# Patient Record
Sex: FEMALE | Race: WHITE | NOT HISPANIC OR LATINO | Employment: OTHER | URBAN - NONMETROPOLITAN AREA
[De-identification: names, ages, dates, MRNs, and addresses within clinical notes are randomized per-mention and may not be internally consistent; named-entity substitution may affect disease eponyms.]

---

## 2017-01-10 ENCOUNTER — OFFICE VISIT (OUTPATIENT)
Dept: NEUROSURGERY | Facility: CLINIC | Age: 37
End: 2017-01-10

## 2017-01-10 VITALS — HEIGHT: 66 IN | BODY MASS INDEX: 27.64 KG/M2 | WEIGHT: 172 LBS

## 2017-01-10 DIAGNOSIS — M50.20 CERVICAL DISC HERNIATION: Primary | ICD-10-CM

## 2017-01-10 PROCEDURE — 99213 OFFICE O/P EST LOW 20 MIN: CPT | Performed by: NEUROLOGICAL SURGERY

## 2017-01-10 NOTE — MR AVS SNAPSHOT
Heidi Franklin   1/10/2017 3:15 PM   Office Visit    Dept Phone:  [phone]   Encounter #:  [id number]    Provider:  Stuart Johnson MD   Department:  [facility]                Your Full Care Plan              Your Updated Medication List          This list is accurate as of: 1/10/17  3:22 PM.  Always use your most recent med list.                HYDROcodone-acetaminophen 5-325 MG per tablet   Commonly known as:  NORCO   Take 1 tablet by mouth Every 6 (Six) Hours As Needed for moderate pain (4-6).       meloxicam 7.5 MG tablet   Commonly known as:  MOBIC   Take 1 tablet by mouth Daily. Do not take with nsaid       MethylPREDNISolone 4 MG tablet   Commonly known as:  MEDROL (JASMYN)   Take as directed on package instructions.       MIRENA (52 MG) IU       SUMAtriptan 100 MG tablet   Commonly known as:  IMITREX   Take 1 tablet by mouth 1 (One) Time As Needed for migraine for up to 1 dose.       tiZANidine 4 MG tablet   Commonly known as:  ZANAFLEX   Take 1 tablet by mouth At Night As Needed for muscle spasms.               We Performed the Following     Case Request Cath Lab:  myelogram cervical spine       You Were Diagnosed With        Codes Comments    Cervical disc herniation    -  Primary ICD-10-CM: M50.20  ICD-9-CM: 722.0       Instructions     None    Patient Instructions History      Upcoming Appointments     Visit Type Date Time Department    OFFICE VISIT 1/10/2017  3:15 PM [facility]      [facility] Signup     [facility] allows you to send messages to your doctor, view your test results, renew your prescriptions, schedule appointments, and more. To sign up, go to [employer] and click on the Sign Up Now link in the New User? box. Enter your [facility] Activation Code exactly as it appears below along with the last four digits of your Social Security Number and your Date of Birth () to complete the sign-up  "process. If you do not sign up before the expiration date, you must request a new code.    [facility] Activation Code: [email or web address]  Expires: 1/21/2017  5:44 AM    If you have questions, you can email [email or web address] or call [phone] to talk to our [facility] staff. Remember, [facility] is NOT to be used for urgent needs. For medical emergencies, dial 911.               Other Info from Your Visit           Allergies     Citalopram  Other (See Comments)      Reason for Visit     Arm Pain     Shoulder Pain           Vital Signs     Height Weight Body Mass Index Smoking Status          66\" (167.6 cm) 172 lb (78 kg) 27.76 kg/m2 Current Every Day Smoker        Problems and Diagnoses Noted     Slipped disc in neck        "

## 2017-01-10 NOTE — PROGRESS NOTES
Subjective   Heidi Franklin is a 36 y.o. female who presents for follow up of right cervical radiculopathy.     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  She was treated with physical therapy and didn't experience any significant improvement.  An injection of steroid in her right shoulder actually gave her relief of her radiculopathy temporarily.    Her MRI scan done on 11/21/16 shows 3 level stenosis at C4 5, C5 6 and C6 7 but it is not adequate for making a diagnosis of the specific level of foraminal stenosis or disc herniation.    The following portions of the patient's history were reviewed, updated as appropriate and approved: allergies, current medications, past family history, past medical history, past social history, past surgical history, review of systems and problem list.     Review of Systems   Constitutional: Negative for activity change, appetite change, chills, diaphoresis, fatigue, fever and unexpected weight change.   HENT: Negative for congestion, dental problem, drooling, ear discharge, ear pain, facial swelling, hearing loss, mouth sores, nosebleeds, postnasal drip, rhinorrhea, sinus pressure, sneezing, sore throat, tinnitus, trouble swallowing and voice change.    Eyes: Negative for photophobia, pain, discharge, redness, itching and visual disturbance.   Respiratory: Negative for apnea, cough, choking, chest tightness, shortness of breath, wheezing and stridor.    Cardiovascular: Negative for chest pain, palpitations and leg swelling.   Gastrointestinal: Negative for abdominal distention, abdominal pain, anal bleeding, blood in stool, constipation, diarrhea, nausea, rectal pain and vomiting.   Endocrine: Negative for cold intolerance, heat intolerance, polydipsia, polyphagia and polyuria.    Genitourinary: Negative for decreased urine volume, difficulty urinating, dysuria, enuresis, flank pain, frequency, genital sores, hematuria and urgency.   Musculoskeletal: Positive for arthralgias, joint swelling, myalgias and neck pain. Negative for back pain, gait problem and neck stiffness.   Skin: Negative for color change, pallor, rash and wound.   Allergic/Immunologic: Negative for environmental allergies, food allergies and immunocompromised state.   Neurological: Positive for weakness and numbness. Negative for dizziness, tremors, seizures, syncope, facial asymmetry, speech difficulty, light-headedness and headaches.   Hematological: Negative for adenopathy. Does not bruise/bleed easily.   Psychiatric/Behavioral: Negative for agitation, behavioral problems, confusion, decreased concentration, dysphoric mood, hallucinations, self-injury, sleep disturbance and suicidal ideas. The patient is not nervous/anxious and is not hyperactive.        Objective    NEUROLOGICAL EXAMINATION:      Right middle finger numbness persists.  Reflexes remain trace and equal in the upper extremities.  There is no motor weakness.    Assessment   Cervical foraminal stenosis right probable C6 7 based on clinical criteria but uncertain based on MRI findings.       Plan   Cervical myelogram        Stuart Johnson MD

## 2017-01-20 ENCOUNTER — APPOINTMENT (OUTPATIENT)
Dept: CT IMAGING | Facility: HOSPITAL | Age: 37
End: 2017-01-20

## 2017-01-20 ENCOUNTER — HOSPITAL ENCOUNTER (OUTPATIENT)
Facility: HOSPITAL | Age: 37
Setting detail: HOSPITAL OUTPATIENT SURGERY
Discharge: HOME | End: 2017-01-20
Attending: RADIOLOGY | Admitting: RADIOLOGY

## 2017-01-20 VITALS
BODY MASS INDEX: 27.35 KG/M2 | WEIGHT: 170.19 LBS | TEMPERATURE: 97.8 F | OXYGEN SATURATION: 96 % | DIASTOLIC BLOOD PRESSURE: 96 MMHG | HEIGHT: 66 IN | SYSTOLIC BLOOD PRESSURE: 139 MMHG | HEART RATE: 94 BPM

## 2017-01-20 DIAGNOSIS — M50.20 CERVICAL DISC HERNIATION: ICD-10-CM

## 2017-01-20 LAB — B-HCG UR QL: NEGATIVE

## 2017-01-20 PROCEDURE — 81025 URINE PREGNANCY TEST: CPT | Performed by: RADIOLOGY

## 2017-01-20 PROCEDURE — 72240 MYELOGRAPHY NECK SPINE: CPT | Performed by: RADIOLOGY

## 2017-01-20 PROCEDURE — 0 IOPAMIDOL 61 % SOLUTION: Performed by: RADIOLOGY

## 2017-01-20 PROCEDURE — 61055 INJECTION INTO BRAIN CANAL: CPT | Performed by: RADIOLOGY

## 2017-01-20 PROCEDURE — 72125 CT NECK SPINE W/O DYE: CPT

## 2017-01-20 RX ORDER — DIAZEPAM 5 MG/1
5 TABLET ORAL ONCE
Status: DISCONTINUED | OUTPATIENT
Start: 2017-01-20 | End: 2017-01-20 | Stop reason: HOSPADM

## 2017-01-20 RX ORDER — LIDOCAINE HYDROCHLORIDE 10 MG/ML
INJECTION, SOLUTION INFILTRATION; PERINEURAL AS NEEDED
Status: DISCONTINUED | OUTPATIENT
Start: 2017-01-20 | End: 2017-01-20 | Stop reason: HOSPADM

## 2017-01-20 RX ORDER — ACETAMINOPHEN 500 MG
500 TABLET ORAL EVERY 6 HOURS PRN
COMMUNITY

## 2017-01-20 NOTE — H&P (VIEW-ONLY)
Subjective   Heidi Franklin is a 36 y.o. female who presents for follow up of right cervical radiculopathy.     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  She was treated with physical therapy and didn't experience any significant improvement.  An injection of steroid in her right shoulder actually gave her relief of her radiculopathy temporarily.    Her MRI scan done on 11/21/16 shows 3 level stenosis at C4 5, C5 6 and C6 7 but it is not adequate for making a diagnosis of the specific level of foraminal stenosis or disc herniation.    The following portions of the patient's history were reviewed, updated as appropriate and approved: allergies, current medications, past family history, past medical history, past social history, past surgical history, review of systems and problem list.     Review of Systems   Constitutional: Negative for activity change, appetite change, chills, diaphoresis, fatigue, fever and unexpected weight change.   HENT: Negative for congestion, dental problem, drooling, ear discharge, ear pain, facial swelling, hearing loss, mouth sores, nosebleeds, postnasal drip, rhinorrhea, sinus pressure, sneezing, sore throat, tinnitus, trouble swallowing and voice change.    Eyes: Negative for photophobia, pain, discharge, redness, itching and visual disturbance.   Respiratory: Negative for apnea, cough, choking, chest tightness, shortness of breath, wheezing and stridor.    Cardiovascular: Negative for chest pain, palpitations and leg swelling.   Gastrointestinal: Negative for abdominal distention, abdominal pain, anal bleeding, blood in stool, constipation, diarrhea, nausea, rectal pain and vomiting.   Endocrine: Negative for cold intolerance, heat intolerance, polydipsia, polyphagia and polyuria.    Genitourinary: Negative for decreased urine volume, difficulty urinating, dysuria, enuresis, flank pain, frequency, genital sores, hematuria and urgency.   Musculoskeletal: Positive for arthralgias, joint swelling, myalgias and neck pain. Negative for back pain, gait problem and neck stiffness.   Skin: Negative for color change, pallor, rash and wound.   Allergic/Immunologic: Negative for environmental allergies, food allergies and immunocompromised state.   Neurological: Positive for weakness and numbness. Negative for dizziness, tremors, seizures, syncope, facial asymmetry, speech difficulty, light-headedness and headaches.   Hematological: Negative for adenopathy. Does not bruise/bleed easily.   Psychiatric/Behavioral: Negative for agitation, behavioral problems, confusion, decreased concentration, dysphoric mood, hallucinations, self-injury, sleep disturbance and suicidal ideas. The patient is not nervous/anxious and is not hyperactive.        Objective    NEUROLOGICAL EXAMINATION:      Right middle finger numbness persists.  Reflexes remain trace and equal in the upper extremities.  There is no motor weakness.    Assessment   Cervical foraminal stenosis right probable C6 7 based on clinical criteria but uncertain based on MRI findings.       Plan   Cervical myelogram        Stuart Johnson MD

## 2017-01-20 NOTE — IP AVS SNAPSHOT
AFTER VISIT SUMMARY             Heidi Franklin           About your hospitalization     You were admitted on:  January 20, 2017 You last received care in the:  [facility]       Procedures & Surgeries      Procedure(s) (LRB):   myelogram cervical spine (N/A)     1/20/2017     Surgeon(s):  Moody Fitch MD  -------------------      Medications    If you or your caregiver advised us that you are currently taking a medication and that medication is marked below as “Resume”, this simply indicates that we have reviewed those medications to make sure our new therapy recommendations do not interfere.  If you have concerns about medications other than those new ones which we are prescribing today, please consult the physician who prescribed them (or your primary physician).  Our review of your home medications is not meant to indicate that we are directing their use.             Your Medications      CONTINUE taking these medications     acetaminophen 500 MG tablet   Take 500 mg by mouth Every 6 (Six) Hours As Needed for mild pain (1-3).   Commonly known as:  TYLENOL           meloxicam 7.5 MG tablet   Take 1 tablet by mouth Daily. Do not take with nsaid   Commonly known as:  MOBIC           MIRENA (52 MG) IU   by Intrauterine route.           SUMAtriptan 100 MG tablet   Take 1 tablet by mouth 1 (One) Time As Needed for migraine for up to 1 dose.   Commonly known as:  IMITREX                      Your Medications      Your Medication List           Morning Noon Evening Bedtime As Needed    acetaminophen 500 MG tablet   Take 500 mg by mouth Every 6 (Six) Hours As Needed for mild pain (1-3).   Commonly known as:  TYLENOL                                meloxicam 7.5 MG tablet   Take 1 tablet by mouth Daily. Do not take with nsaid   Commonly known as:  MOBIC                                MIRENA (52 MG) IU   by Intrauterine route.                                SUMAtriptan 100 MG tablet   Take 1 tablet by  mouth 1 (One) Time As Needed for migraine for up to 1 dose.   Commonly known as:  IMITREX                                         Instructions for After Discharge        Discharge References/Attachments     MYELOGRAPHY, CARE AFTER, EASY-TO-READ (ENGLISH)       Follow-ups for After Discharge        Follow-up Information     Follow up with Stuart Johnson MD Follow up in 1 week(s).    Specialty:  Neurosurgery    Why:  [facility]    Contact information:    [address]    [facility] [address] [phone]        Scheduled Appointments     Jan 31, 2017 11:45 AM EST   Office Visit with Stuart Johnson MD   [facility] (--)    [address]  [facility] [address] [phone]           Arrive 15 minutes prior to appointment.              Inside Signup     Our records indicate that you have an active [employer] account.    You can view your After Visit Summary by going to [employer] and logging in with your Inside username and password.  If you don't have a Inside username and password but a parent or guardian has access to your record, the parent or guardian should login with their own Inside username and password and access your record to view the After Visit Summary.    If you have questions, you can email [email or web address] or call [phone] to talk to our Inside staff.  Remember, Inside is NOT to be used for urgent needs.  For medical emergencies, dial 911.           Summary of Your Hospitalization        Reason for Hospitalization     Your primary diagnosis was:  Not on File      Care Providers     Provider Service Role Specialty    Moody Fitch MD Radiology Attending Provider Radiology      Your Allergies  Date Reviewed: 1/20/2017    Allergen Reactions    Citalopram Other (See Comments)      Patient Belongings Returned     Document Return of Belongings Flowsheet     Were the patient  bedside belongings sent home?   --   Belongings Retrieved from Security & Sent Home   --    Belongings Sent to Safe   --   Medications Retrieved from Pharmacy & Sent Home   --              More Information      Myelography, Care After  These instructions give you information on caring for yourself after your procedure. Your doctor may also give you more specific instructions. Call your doctor if you have any problems or questions after your procedure.  HOME CARE  · Rest the first day.  · When you rest, lie flat, with your head slightly raised (elevated).  · Avoid heavy lifting and activity for 48 hours, or as told by your doctor.  · You may take the bandage (dressing) off one day after the test, or as told by your doctor.  · Take all medicines only as told by your doctor.  · Ask your doctor when it is okay to take a shower or bath.  · Ask your doctor when your test results will be ready and how you can get them. Make sure you follow up and get your results.  · Do not drink alcohol for 24 hours, or as told by your doctor.  · Drink enough fluid to keep your pee (urine) clear or pale yellow.  GET HELP IF:   · You have a fever.  · You have a headache.  · You feel sick to your stomach (nauseous) or throw up (vomit).  · You have pain or cramping in your belly (abdomen).  GET HELP RIGHT AWAY IF:   · You have a headache with a stiff neck or fever.  · You have trouble breathing.  · Any of the places where the needles were put in are:    Puffy (swollen) or red.    Sore or hot to the touch.    Draining yellowish-white fluid (pus).    Bleeding.  MAKE SURE YOU:  · Understand these instructions.  · Will watch your condition.  · Will get help right away if you are not doing well or get worse.     This information is not intended to replace advice given to you by your health care provider. Make sure you discuss any questions you have with your health care provider.     Document Released: 09/26/2009 Document Revised: 01/08/2016  Document Reviewed: 09/11/2013  [employer] Interactive Patient Education ©2016 [employer]         PREVENTING SURGICAL SITE INFECTIONS     Surgical Site Infections FAQs  What is a Surgical Site Infection (SSI)?  A surgical site infection is an infection that occurs after surgery in the part of the body where the surgery took place. Most patients who have surgery do not develop an infection. However, infections develop in about 1 to 3 out of every 100 patients who have surgery.  Some of the common symptoms of a surgical site infection are:  · Redness and pain around the area where you had surgery  · Drainage of cloudy fluid from your surgical wound  · Fever  Can SSIs be treated?  Yes. Most surgical site infections can be treated with antibiotics. The antibiotic given to you depends on the bacteria (germs) causing the infection. Sometimes patients with SSIs also need another surgery to treat the infection.  What are some of the things that hospitals are doing to prevent SSIs?  To prevent SSIs, doctors, nurses, and other healthcare providers:  · Clean their hands and arms up to their elbows with an antiseptic agent just before the surgery.  · Clean their hands with soap and water or an alcohol-based hand rub before and after caring for each patient.  · May remove some of your hair immediately before your surgery using electric clippers if the hair is in the same area where the procedure will occur. They should not shave you with a razor.  · Wear special hair covers, masks, gowns, and gloves during surgery to keep the surgery area clean.  · Give you antibiotics before your surgery starts. In most cases, you should get antibiotics within 60 minutes before the surgery starts and the antibiotics should be stopped within 24 hours after surgery.  · Clean the skin at the site of your surgery with a special soap that kills germs.  What can I do to help prevent SSIs?  Before your surgery:  · Tell your doctor about other medical  problems you may have. Health problems such as allergies, diabetes, and obesity could affect your surgery and your treatment.  · Quit smoking. Patients who smoke get more infections. Talk to your doctor about how you can quit before your surgery.  · Do not shave near where you will have surgery. Shaving with a razor can irritate your skin and make it easier to develop an infection.  At the time of your surgery:  · Speak up if someone tries to shave you with a razor before surgery. Ask why you need to be shaved and talk with your surgeon if you have any concerns.  · Ask if you will get antibiotics before surgery.  After your surgery:  · Make sure that your healthcare providers clean their hands before examining you, either with soap and water or an alcohol-based hand rub.    If you do not see your providers clean their hands, please ask them to do so.  · Family and friends who visit you should not touch the surgical wound or dressings.  · Family and friends should clean their hands with soap and water or an alcohol-based hand rub before and after visiting you. If you do not see them clean their hands, ask them to clean their hands.  What do I need to do when I go home from the hospital?  · Before you go home, your doctor or nurse should explain everything you need to know about taking care of your wound. Make sure you understand how to care for your wound before you leave the hospital.  · Always clean your hands before and after caring for your wound.  · Before you go home, make sure you know who to contact if you have questions or problems after you get home.  · If you have any symptoms of an infection, such as redness and pain at the surgery site, drainage, or fever, call your doctor immediately.  If you have additional questions, please ask your doctor or nurse.  Developed and co-sponsored by The Society for Healthcare Epidemiology of [place] (SHEA); Infectious Diseases Society of [place] (IDSA); [facility]  Association; Association for Professionals in Infection Control and Epidemiology (APIC); Centers for Disease Control and Prevention (CDC); and The Joint Commission.     This information is not intended to replace advice given to you by your health care provider. Make sure you discuss any questions you have with your health care provider.     Document Released: 12/23/2014 Document Revised: 01/08/2016 Document Reviewed: 03/02/2016  [employer] Interactive Patient Education ©2016 [employer]             SYMPTOMS OF A STROKE    Call 911 or have someone take you to the Emergency Department if you have any of the following:    · Sudden numbness or weakness of your face, arm or leg especially on one side of the body  · Sudden confusion, diffiiculty speaking or trouble understanding   · Changes in your vision or loss of sight in one eye  · Sudden severe headache with no known cause  · sudden dizziness, trouble walking, loss of balance or coordination    It is important to seek emergency care right away if you have further stroke symptoms. If you get emergency help quickly, the powerful clot-dissolving medicines can reduce the disabilities caused by a stroke.     For more information:    American Stroke Association  2-582-6-STROKE  www.strokeassociation.org           IF YOU SMOKE OR USE TOBACCO PLEASE READ THE FOLLOWING:    Why is smoking bad for me?  Smoking increases the risk of heart disease, lung disease, vascular disease, stroke, and cancer.     If you smoke, STOP!    If you would like more information on quitting smoking, please visit the [employer] website: [email or web address]   This link will provide additional resources including the QUIT line and the [employer] support groups.     For more information:    American Cancer Society  ([phone] 373-8401    American Heart Association  1-350.765.8741               YOU ARE THE MOST IMPORTANT FACTOR IN YOUR RECOVERY.     Follow  all instructions carefully.     I have reviewed my discharge instructions with my nurse, including the following information, if applicable:     Information about my illness and diagnosis   Follow up appointments (including lab draws)   Wound Care   Equipment Needs   Medications (new and continuing) along with side effects   Preventative information such as vaccines and smoking cessations   Diet   Pain   I know when to contact my Doctor's office or seek emergency care      I want my nurse to describe the side effects of my medications: YES NO   If the answer is no, I understand the side effects of my medications: YES NO   My nurse described the side effects of my medications in a way that I could understand: YES NO   I have taken my personal belongings and my own medications with me at discharge: YES NO            I have received this information and my questions have been answered. I have discussed any concerns I see with this plan with the nurse or physician. I understand these instructions.    Signature of Patient or Responsible Person: _____________________________________    Date: _________________  Time: __________________    Signature of Healthcare Provider: _______________________________________  Date: _________________  Time: __________________

## 2017-01-31 ENCOUNTER — OFFICE VISIT (OUTPATIENT)
Dept: NEUROSURGERY | Facility: CLINIC | Age: 37
End: 2017-01-31

## 2017-01-31 VITALS — BODY MASS INDEX: 27.64 KG/M2 | WEIGHT: 172 LBS | HEIGHT: 66 IN

## 2017-01-31 DIAGNOSIS — M48.02 FORAMINAL STENOSIS OF CERVICAL REGION: ICD-10-CM

## 2017-01-31 DIAGNOSIS — M54.12 CERVICAL RADICULOPATHY: Primary | ICD-10-CM

## 2017-01-31 PROCEDURE — 99213 OFFICE O/P EST LOW 20 MIN: CPT | Performed by: NEUROLOGICAL SURGERY

## 2017-01-31 NOTE — PROGRESS NOTES
Subjective   Heidi Franklin is a 36 y.o. female who presents for follow up of  right shoulder and arm pain.     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  Pain has begun to experience on the left side now as well going to the middle finger.  She's had physical therapy without seeing improvement.    MRI scan on 11/21/16 showed 3 level stenosis at C4 5, C5 6 and C6 7.  Cervical myelogram was done on 1/20/17 and shows bilateral nerve root impingement at C6 7, right sided nerve root impingement at C5 6, and even right sided nerve root impingement at C4 5.  There is a kyphotic curve from C4 to C7.    Surgical correction of this problem would require decompression of the right side at at least 3 levels from C4 to C7, and on the left side at C67 in order to decompress all the radiographic abnormalities, though the clinical symptoms appear to be generated at the C6 7 level.    The following portions of the patient's history were reviewed, updated as appropriate and approved: allergies, current medications, past family history, past medical history, past social history, past surgical history, review of systems and problem list.     Review of Systems   Constitutional: Negative for activity change, appetite change, chills, diaphoresis, fatigue, fever and unexpected weight change.   HENT: Negative for congestion, dental problem, drooling, ear discharge, ear pain, facial swelling, hearing loss, mouth sores, nosebleeds, postnasal drip, rhinorrhea, sinus pressure, sneezing, sore throat, tinnitus, trouble swallowing and voice change.    Eyes: Negative for photophobia, pain, discharge, redness, itching and visual disturbance.   Respiratory: Negative for apnea, cough, choking, chest tightness, shortness of breath, wheezing and stridor.    Cardiovascular: Negative for chest pain,  palpitations and leg swelling.   Gastrointestinal: Negative for abdominal distention, abdominal pain, anal bleeding, blood in stool, constipation, diarrhea, nausea, rectal pain and vomiting.   Endocrine: Negative for cold intolerance, heat intolerance, polydipsia, polyphagia and polyuria.   Genitourinary: Negative for decreased urine volume, difficulty urinating, dysuria, enuresis, flank pain, frequency, genital sores, hematuria and urgency.   Musculoskeletal: Positive for arthralgias, joint swelling, myalgias and neck pain. Negative for back pain, gait problem and neck stiffness.   Skin: Negative for color change, pallor, rash and wound.   Allergic/Immunologic: Negative for environmental allergies, food allergies and immunocompromised state.   Neurological: Positive for weakness and numbness. Negative for dizziness, tremors, seizures, syncope, facial asymmetry, speech difficulty, light-headedness and headaches.   Hematological: Negative for adenopathy. Does not bruise/bleed easily.   Psychiatric/Behavioral: Negative for agitation, behavioral problems, confusion, decreased concentration, dysphoric mood, hallucinations, self-injury, sleep disturbance and suicidal ideas. The patient is not nervous/anxious and is not hyperactive.        Objective    NEUROLOGICAL EXAMINATION:    Bilateral middle finger numbness.  Trace and equal biceps and triceps reflexes bilaterally.    Assessment   Bilateral C7 radiculopathy with bilateral foraminal stenosis C6 7.  Also significant degenerative disc change C4 5, C5 6, and C6 7.  Right nerve root compression at C5 6 and C4 5.       Plan   Because of the complexity of the situation I will have her go through a trial of cervical epidural injections to see if this can control symptoms.  If it doesn't we will have to see her again and make a decision regarding surgery, which would be to either select C6 7 for an ACDF based on the clinical symptoms and radiographic findings, or 3 level  ACDF simply based on the radiographic findings.  I've explained to her that adding additional levels introduces new complicating potential problems.  Follow-up after completing her pain management evaluation and treatment if symptoms persist.       Stuart Johnson MD

## 2017-01-31 NOTE — MR AVS SNAPSHOT
Heidi Franklin   1/31/2017 11:45 AM   Office Visit    Dept Phone:  [phone]   Encounter #:  [id number]    Provider:  Stuart Johnson MD   Department:  [facility]                Your Full Care Plan              Your Updated Medication List          This list is accurate as of: 1/31/17  1:04 PM.  Always use your most recent med list.                acetaminophen 500 MG tablet   Commonly known as:  TYLENOL       meloxicam 7.5 MG tablet   Commonly known as:  MOBIC   Take 1 tablet by mouth Daily. Do not take with nsaid       MIRENA (52 MG) IU       SUMAtriptan 100 MG tablet   Commonly known as:  IMITREX   Take 1 tablet by mouth 1 (One) Time As Needed for migraine for up to 1 dose.               We Performed the Following     Ambulatory Referral to Pain Management       You Were Diagnosed With        Codes Comments    Cervical radiculopathy    -  Primary ICD-10-CM: M54.12  ICD-9-CM: 723.4     Foraminal stenosis of cervical region     ICD-10-CM: M99.81  ICD-9-CM: 723.0       Instructions     None    Patient Instructions History      Upcoming Appointments     Visit Type Date Time Department    OFFICE VISIT 1/31/2017 11:45 AM [facility]      [employer] Signup     Our records indicate that you have an active [facility] [employer] account.    You can view your After Visit Summary by going to [employer] and logging in with your [employer] username and password.  If you don't have a [employer] username and password but a parent or guardian has access to your record, the parent or guardian should login with their own [employer] username and password and access your record to view the After Visit Summary.    If you have questions, you can email [email or web address] or call [phone] to talk to our [employer] staff.  Remember, [employer] is NOT to be used for urgent needs.  For medical emergencies, dial 911.               Other Info from Your  "Visit           Allergies     Citalopram  Other (See Comments)      Reason for Visit     Arm Pain     Shoulder Pain           Vital Signs     Height Weight Body Mass Index Smoking Status          66\" (167.6 cm) 172 lb (78 kg) 27.76 kg/m2 Current Every Day Smoker        Problems and Diagnoses Noted     Cervical nerve root disorder    -  Primary    Foraminal stenosis of cervical region            "

## 2017-02-01 ENCOUNTER — TELEPHONE (OUTPATIENT)
Dept: INTERNAL MEDICINE | Facility: CLINIC | Age: 37
End: 2017-02-01

## 2017-02-01 DIAGNOSIS — M50.10 CERVICAL RADICULOPATHY DUE TO INTERVERTEBRAL DISC DISORDER: Primary | ICD-10-CM

## 2017-02-01 NOTE — TELEPHONE ENCOUNTER
----- Message from Agnes Van sent at 1/31/2017  4:09 PM EST -----  Contact: Patient  Priyanka had referred the patient to neuro. Today she saw Dr. Johnson, but didn't feel confident in his findings. Pt would like a referral to see a [facility] neurologist for a second opinion.

## 2017-05-09 ENCOUNTER — OFFICE VISIT (OUTPATIENT)
Dept: SURGERY | Facility: CLINIC | Age: 37
End: 2017-05-09

## 2017-05-09 VITALS
SYSTOLIC BLOOD PRESSURE: 136 MMHG | TEMPERATURE: 98.8 F | BODY MASS INDEX: 27.64 KG/M2 | WEIGHT: 172 LBS | HEIGHT: 66 IN | DIASTOLIC BLOOD PRESSURE: 88 MMHG

## 2017-05-09 DIAGNOSIS — N63.0 BREAST MASS: ICD-10-CM

## 2017-05-09 DIAGNOSIS — R22.31 AXILLARY MASS, RIGHT: Primary | ICD-10-CM

## 2017-05-09 PROCEDURE — 99244 OFF/OP CNSLTJ NEW/EST MOD 40: CPT | Performed by: SURGERY

## 2017-05-16 ENCOUNTER — HOSPITAL ENCOUNTER (OUTPATIENT)
Dept: ULTRASOUND IMAGING | Facility: HOSPITAL | Age: 37
Discharge: HOME | End: 2017-05-16

## 2017-05-16 ENCOUNTER — HOSPITAL ENCOUNTER (OUTPATIENT)
Dept: MAMMOGRAPHY | Facility: HOSPITAL | Age: 37
Discharge: HOME | End: 2017-05-16
Attending: SURGERY | Admitting: SURGERY

## 2017-05-16 DIAGNOSIS — N63.10 MASS OF BREAST, RIGHT: ICD-10-CM

## 2017-05-16 PROCEDURE — 76642 ULTRASOUND BREAST LIMITED: CPT

## 2017-05-16 PROCEDURE — G0279 TOMOSYNTHESIS, MAMMO: HCPCS

## 2017-05-16 PROCEDURE — G0204 DX MAMMO INCL CAD BI: HCPCS

## 2017-05-23 ENCOUNTER — OFFICE VISIT (OUTPATIENT)
Dept: SURGERY | Facility: CLINIC | Age: 37
End: 2017-05-23

## 2017-05-23 VITALS — BODY MASS INDEX: 27.64 KG/M2 | HEIGHT: 66 IN | WEIGHT: 172 LBS | TEMPERATURE: 98.6 F

## 2017-05-23 DIAGNOSIS — N63.0 BREAST MASS: Primary | ICD-10-CM

## 2017-05-23 PROCEDURE — 19083 BX BREAST 1ST LESION US IMAG: CPT | Performed by: SURGERY

## 2017-05-23 RX ORDER — DIAZEPAM 10 MG/1
TABLET ORAL
COMMUNITY
Start: 2017-05-22 | End: 2017-10-31

## 2017-05-24 ENCOUNTER — TELEPHONE (OUTPATIENT)
Dept: SURGERY | Facility: CLINIC | Age: 37
End: 2017-05-24

## 2017-05-30 ENCOUNTER — OFFICE VISIT (OUTPATIENT)
Dept: SURGERY | Facility: CLINIC | Age: 37
End: 2017-05-30

## 2017-05-30 VITALS
WEIGHT: 172 LBS | DIASTOLIC BLOOD PRESSURE: 84 MMHG | HEIGHT: 66 IN | SYSTOLIC BLOOD PRESSURE: 130 MMHG | HEART RATE: 74 BPM | BODY MASS INDEX: 27.64 KG/M2 | TEMPERATURE: 98.2 F

## 2017-05-30 DIAGNOSIS — N63.0 BREAST MASS: Primary | ICD-10-CM

## 2017-05-30 PROCEDURE — 99213 OFFICE O/P EST LOW 20 MIN: CPT | Performed by: SURGERY

## 2017-06-05 ENCOUNTER — OFFICE VISIT (OUTPATIENT)
Dept: SURGERY | Facility: CLINIC | Age: 37
End: 2017-06-05

## 2017-06-05 VITALS
TEMPERATURE: 98.4 F | BODY MASS INDEX: 27.64 KG/M2 | DIASTOLIC BLOOD PRESSURE: 88 MMHG | SYSTOLIC BLOOD PRESSURE: 140 MMHG | WEIGHT: 172 LBS | HEIGHT: 66 IN

## 2017-06-05 DIAGNOSIS — N60.02 BREAST CYST, LEFT: Primary | ICD-10-CM

## 2017-06-05 PROCEDURE — 10022 PR FINE NEEDLE ASP;W/IMAGING GUIDANCE: CPT | Performed by: SURGERY

## 2017-06-05 PROCEDURE — 99213 OFFICE O/P EST LOW 20 MIN: CPT | Performed by: SURGERY

## 2017-06-05 NOTE — PROGRESS NOTES
"Patient: Heidi Franklin    YOB: 1980    Date: 06/05/2017    Primary Care Provider: Marilyn K. Vermeesch, MD    Reason for Consultation: Left Breast cyst    Chief Complaint:   Chief Complaint   Patient presents with   • Abnormal Breast Imaging     Left Breast cyst. Had mammogram and ultrasound [facility]. Left breast birads 3.       History: The patient is here today following up from a recent mammogram and left breast ultrasound.  The ultrasound showed a left breast cyst.  She is here today for a possible fine needle aspiration.  She denies a left breast lump or skin changes.  She does have breast tenderness.      Review of Systems    Vital Signs  /88 (BP Location: Left arm)  Temp 98.4 °F (36.9 °C) (Temporal Artery )   Ht 66\" (167.6 cm)  Wt 172 lb (78 kg)  BMI 27.76 kg/m2    Allergies:  Allergies   Allergen Reactions   • Citalopram Other (See Comments)       Medications:    Current Outpatient Prescriptions:   •  acetaminophen (TYLENOL) 500 MG tablet, Take 500 mg by mouth Every 6 (Six) Hours As Needed for mild pain (1-3)., Disp: , Rfl:   •  Levonorgestrel (MIRENA, 52 MG, IU), by Intrauterine route., Disp: , Rfl:   •  meloxicam (MOBIC) 7.5 MG tablet, Take 1 tablet by mouth Daily. Do not take with nsaid, Disp: 30 tablet, Rfl: 1  •  SUMAtriptan (IMITREX) 100 MG tablet, Take 1 tablet by mouth 1 (One) Time As Needed for migraine for up to 1 dose., Disp: 9 tablet, Rfl: 5  •  diazePAM (VALIUM) 10 MG tablet, , Disp: , Rfl:     Physical Exam:   General Appearance:    Alert, cooperative, in no acute distress   Head:    Normocephalic, without obvious abnormality, atraumatic   Lungs:     Clear to auscultation,respirations regular, even and                  unlabored    Heart:    Regular rhythm and normal rate, normal S1 and S2, no            murmur, no gallop, no rub, no click   Abdomen:     Normal bowel sounds, no masses, no organomegaly, soft        non-tender, non-distended, no guarding, no rebound            "     tenderness   Extremities:   Moves all extremities well, no edema, no cyanosis, no             redness   Pulses:   Pulses palpable and equal bilaterally   Skin:   No bleeding, bruising or rash      Assessment/Plan     1. Breast cyst, left        Procedure:    I recommend a FNA of the left breast lesion. The procedure and risks were clearly explained including bleeding, infection and requirement for re-biopsy and the patient understood these and wishes to proceed.    The patient was brought to the procedure room. Consent and time out were performed. The area was prepped and draped in the usual fashion. 1% lidocaine with epinephrine was infused locally. A 20G needle was used for biopsy under ultrasound guidance. Minimal blood loss had occurred and hemostasis had been well controlled with pressure. . The patient tolerated the procedure and there were no complications. We will make a f/u appointment to discuss results.      Rizwan Perera MD  06/06/17

## 2017-10-31 ENCOUNTER — OFFICE VISIT (OUTPATIENT)
Dept: INTERNAL MEDICINE | Facility: CLINIC | Age: 37
End: 2017-10-31

## 2017-10-31 ENCOUNTER — HOSPITAL ENCOUNTER (OUTPATIENT)
Dept: GENERAL RADIOLOGY | Facility: HOSPITAL | Age: 37
Discharge: HOME | End: 2017-10-31
Attending: FAMILY MEDICINE | Admitting: FAMILY MEDICINE

## 2017-10-31 VITALS
HEIGHT: 66 IN | SYSTOLIC BLOOD PRESSURE: 134 MMHG | TEMPERATURE: 98.5 F | DIASTOLIC BLOOD PRESSURE: 82 MMHG | RESPIRATION RATE: 12 BRPM | BODY MASS INDEX: 27.48 KG/M2 | HEART RATE: 98 BPM | OXYGEN SATURATION: 99 % | WEIGHT: 171 LBS

## 2017-10-31 DIAGNOSIS — M54.6 CHRONIC THORACIC BACK PAIN, UNSPECIFIED BACK PAIN LATERALITY: ICD-10-CM

## 2017-10-31 DIAGNOSIS — G89.29 CHRONIC RIGHT-SIDED LOW BACK PAIN, WITH SCIATICA PRESENCE UNSPECIFIED: ICD-10-CM

## 2017-10-31 DIAGNOSIS — M54.5 CHRONIC RIGHT-SIDED LOW BACK PAIN, WITH SCIATICA PRESENCE UNSPECIFIED: ICD-10-CM

## 2017-10-31 DIAGNOSIS — Z98.890 H/O CERVICAL SPINE SURGERY: ICD-10-CM

## 2017-10-31 DIAGNOSIS — G89.29 CHRONIC THORACIC BACK PAIN, UNSPECIFIED BACK PAIN LATERALITY: ICD-10-CM

## 2017-10-31 DIAGNOSIS — M54.12 CERVICAL RADICULOPATHY: Primary | ICD-10-CM

## 2017-10-31 DIAGNOSIS — M54.2 NECK PAIN: ICD-10-CM

## 2017-10-31 DIAGNOSIS — M54.12 CERVICAL RADICULOPATHY: ICD-10-CM

## 2017-10-31 PROCEDURE — 72070 X-RAY EXAM THORAC SPINE 2VWS: CPT

## 2017-10-31 PROCEDURE — 72040 X-RAY EXAM NECK SPINE 2-3 VW: CPT

## 2017-10-31 PROCEDURE — 99213 OFFICE O/P EST LOW 20 MIN: CPT | Performed by: FAMILY MEDICINE

## 2017-10-31 PROCEDURE — 72110 X-RAY EXAM L-2 SPINE 4/>VWS: CPT

## 2017-10-31 RX ORDER — TIZANIDINE HYDROCHLORIDE 2 MG/1
2 CAPSULE, GELATIN COATED ORAL 3 TIMES DAILY
Qty: 90 CAPSULE | Refills: 1 | Status: SHIPPED | OUTPATIENT
Start: 2017-10-31 | End: 2018-06-15

## 2017-10-31 RX ORDER — OXYCODONE HYDROCHLORIDE 5 MG/1
TABLET ORAL
Refills: 0 | COMMUNITY
Start: 2017-08-08 | End: 2018-06-15 | Stop reason: SDUPTHER

## 2017-10-31 RX ORDER — CYCLOBENZAPRINE HCL 5 MG
TABLET ORAL
Refills: 0 | COMMUNITY
Start: 2017-10-28 | End: 2017-10-31

## 2017-11-03 ENCOUNTER — TELEPHONE (OUTPATIENT)
Dept: INTERNAL MEDICINE | Facility: CLINIC | Age: 37
End: 2017-11-03

## 2017-11-21 ENCOUNTER — TELEPHONE (OUTPATIENT)
Dept: OBSTETRICS AND GYNECOLOGY | Facility: CLINIC | Age: 37
End: 2017-11-21

## 2017-11-21 NOTE — TELEPHONE ENCOUNTER
----- Message from Stevie French sent at 11/21/2017  9:39 AM EST -----  Contact: PATIENT  Patient called to schedule her annual in January and wants to know if she can have Diflucan called in. Patient also wants to be notified when its been approved.

## 2017-11-22 RX ORDER — FLUCONAZOLE 150 MG/1
150 TABLET ORAL DAILY
Qty: 1 TABLET | Refills: 1 | Status: SHIPPED | OUTPATIENT
Start: 2017-11-22 | End: 2018-01-23

## 2018-01-23 ENCOUNTER — OFFICE VISIT (OUTPATIENT)
Dept: OBSTETRICS AND GYNECOLOGY | Facility: CLINIC | Age: 38
End: 2018-01-23

## 2018-01-23 VITALS
BODY MASS INDEX: 27.97 KG/M2 | SYSTOLIC BLOOD PRESSURE: 134 MMHG | HEIGHT: 66 IN | WEIGHT: 174 LBS | DIASTOLIC BLOOD PRESSURE: 70 MMHG

## 2018-01-23 DIAGNOSIS — Z01.419 ENCOUNTER FOR GYNECOLOGICAL EXAMINATION WITHOUT ABNORMAL FINDING: Primary | ICD-10-CM

## 2018-01-23 PROCEDURE — 99395 PREV VISIT EST AGE 18-39: CPT | Performed by: OBSTETRICS & GYNECOLOGY

## 2018-01-23 RX ORDER — CYCLOBENZAPRINE HCL 5 MG
TABLET ORAL
Refills: 0 | COMMUNITY
Start: 2017-11-26 | End: 2018-06-15 | Stop reason: SDUPTHER

## 2018-01-23 NOTE — PROGRESS NOTES
Subjective   Chief Complaint   Patient presents with   • Gynecologic Exam     Last pap 2016 WNL  Mirena Inserted 2016      Heidi Franklin is a 37 y.o. year old  presenting to be seen for her annual exam.     SEXUAL Hx:  She is currently sexually active.  In the past year there has not been new sexual partners.    Condoms are not typically used.  She would not like to be screened for STD's at today's exam.  Current birth control method: IUD - Mirena.  MENSTRUAL Hx:  No LMP recorded. Patient has had an implant.  In the past 6 months her cycles have been absent.   Her menstrual flow has been absent.   Each month on average there are roughly 0 days of very heavy flow.    Intermenstrual bleeding is absent.    Post-coital bleeding is absent.  Dysmenorrhea: is not affecting her activities of daily living  PMS: none  Her cycles are not a source of concern for her that she wishes to discuss today.  HEALTH Hx:  She exercises regularly: no (and has no plans to become more active).  She wears her seat belt:yes.  She has concerns about domestic violence: no.  OTHER COMPLAINTS:  Nothing else    The following portions of the patient's history were reviewed and updated as appropriate:  She  has a past medical history of Carpal tunnel syndrome; Cervical disc herniation (2016); Encounter for insertion of mirena IUD (2016); Encounter for insertion of mirena IUD (2016); History of Papanicolaou smear of cervix (10/20/2015); History of Papanicolaou smear of cervix (2016); and Scoliosis.  She  does not have any pertinent problems on file.  She  has a past surgical history that includes Breast surgery (Right);  section; Interventional radiology procedure (N/A, 2017); and Other surgical history.  Her family history includes COPD in her other.  She  reports that she has been smoking.  She has a 22.50 pack-year smoking history. She has never used smokeless tobacco. She reports that she  does not drink alcohol or use illicit drugs.  Current Outpatient Prescriptions   Medication Sig Dispense Refill   • acetaminophen (TYLENOL) 500 MG tablet Take 500 mg by mouth Every 6 (Six) Hours As Needed for mild pain (1-3).     • Levonorgestrel (MIRENA, 52 MG, IU) by Intrauterine route.     • oxyCODONE (ROXICODONE) 5 MG immediate release tablet take 1 tablet by mouth every 4 to 6 hours if needed for Breakthrough pain  0   • SUMAtriptan (IMITREX) 100 MG tablet Take 1 tablet by mouth 1 (One) Time As Needed for migraine for up to 1 dose. 9 tablet 5   • TiZANidine (ZANAFLEX) 2 MG capsule Take 1 capsule by mouth 3 (Three) Times a Day. 90 capsule 01   • cyclobenzaprine (FLEXERIL) 5 MG tablet   0     No current facility-administered medications for this visit.      Current Outpatient Prescriptions on File Prior to Visit   Medication Sig   • acetaminophen (TYLENOL) 500 MG tablet Take 500 mg by mouth Every 6 (Six) Hours As Needed for mild pain (1-3).   • Levonorgestrel (MIRENA, 52 MG, IU) by Intrauterine route.   • oxyCODONE (ROXICODONE) 5 MG immediate release tablet take 1 tablet by mouth every 4 to 6 hours if needed for Breakthrough pain   • SUMAtriptan (IMITREX) 100 MG tablet Take 1 tablet by mouth 1 (One) Time As Needed for migraine for up to 1 dose.   • TiZANidine (ZANAFLEX) 2 MG capsule Take 1 capsule by mouth 3 (Three) Times a Day.   • [DISCONTINUED] fluconazole (DIFLUCAN) 150 MG tablet Take 1 tablet by mouth Daily.     No current facility-administered medications on file prior to visit.      She is allergic to citalopram..    Smoking status: Current Every Day Smoker                                                   Packs/day: 1.50      Years: 15.00     Smokeless status: Never Used                        Review of Systems  Consitutional NEG: anorexia or night sweats    POS: nothing reported   Gastointestinal NEG: bloating, change in bowel habits, melena or reflux symptoms    POS: nothing reported   Genitourinary  "NEG: dysuria or hematuria    POS: nothing reported   Integument NEG: moles that are changing in size, shape, color or rashes    POS: nothing reported   Breast NEG: persistent breast lump, skin dimpling or nipple discharge    POS: nothing reported          Objective   /70  Ht 167.6 cm (66\")  Wt 78.9 kg (174 lb)  BMI 28.08 kg/m2    General:  well developed; well nourished  no acute distress   Skin:  No suspicious lesions seen   Thyroid: normal to inspection and palpation   Breasts:  Examined in upright and supine position  Symmetric without masses or skin dimpling  Nipples normal without inversion, lesions or discharge  There are no palpable axillary nodes   Abdomen: soft, non-tender; no masses  no umbilical or inginual hernias are present  no hepato-splenomegaly   Pelvis: Clinical staff was present for exam  External genitalia:  normal appearance of the external genitalia including Bartholin's and [place]'s glands.  :  urethral meatus normal;  Vaginal:  normal pink mucosa without prolapse or lesions.  Cervix:  normal appearance.  Uterus:  normal size, shape and consistency.  Adnexa:  normal bimanual exam of the adnexa.  Rectal:  digital rectal exam not performed; anus visually normal appearing.        Assessment   1. Normal PE   2. Strings visible     Plan   1. PAP done  2. MMg this MAy  3. Follow up PCP for fasting labs           This note was electronically signed.      January 23, 2018    "

## 2018-01-31 DIAGNOSIS — Z01.419 ENCOUNTER FOR GYNECOLOGICAL EXAMINATION WITHOUT ABNORMAL FINDING: ICD-10-CM

## 2018-06-15 ENCOUNTER — OFFICE VISIT (OUTPATIENT)
Dept: INTERNAL MEDICINE | Facility: CLINIC | Age: 38
End: 2018-06-15

## 2018-06-15 VITALS
HEIGHT: 66 IN | OXYGEN SATURATION: 98 % | SYSTOLIC BLOOD PRESSURE: 124 MMHG | BODY MASS INDEX: 26.36 KG/M2 | HEART RATE: 95 BPM | WEIGHT: 164 LBS | RESPIRATION RATE: 14 BRPM | TEMPERATURE: 98.9 F | DIASTOLIC BLOOD PRESSURE: 82 MMHG

## 2018-06-15 DIAGNOSIS — M50.20 CERVICAL DISC HERNIATION: Primary | ICD-10-CM

## 2018-06-15 PROCEDURE — 99213 OFFICE O/P EST LOW 20 MIN: CPT | Performed by: FAMILY MEDICINE

## 2018-06-15 RX ORDER — GABAPENTIN 100 MG/1
100 CAPSULE ORAL 3 TIMES DAILY
Qty: 90 CAPSULE | Refills: 1 | Status: SHIPPED | OUTPATIENT
Start: 2018-06-15 | End: 2019-04-05 | Stop reason: SDUPTHER

## 2018-06-15 RX ORDER — CYCLOBENZAPRINE HCL 5 MG
5 TABLET ORAL 3 TIMES DAILY PRN
Qty: 90 TABLET | Refills: 5 | Status: SHIPPED | OUTPATIENT
Start: 2018-06-15 | End: 2019-07-05 | Stop reason: SDUPTHER

## 2018-06-15 RX ORDER — DIAZEPAM 5 MG/1
5 TABLET ORAL 3 TIMES DAILY PRN
Refills: 0 | COMMUNITY
Start: 2018-05-08 | End: 2020-07-02 | Stop reason: SDUPTHER

## 2018-06-15 RX ORDER — OXYCODONE HYDROCHLORIDE 5 MG/1
5 TABLET ORAL EVERY 8 HOURS PRN
Qty: 21 TABLET | Refills: 0 | Status: SHIPPED | OUTPATIENT
Start: 2018-06-15 | End: 2018-06-22

## 2018-06-16 NOTE — PROGRESS NOTES
"Subjective    Heidi Franklin is a 37 y.o. female here for:  Chief Complaint   Patient presents with   • Follow-up     Follow up after spine surgery     History of Present Illness     Continues to suffer from pain in neck, arm on right, lower back. Had neck surgery earlier this year and has been released by neurosurgery. She has gone back to work but is struggling to continue, has had many absences and she fears termination. She's filed for disability. She's worked for [employer] 17 years. Taking medicines as prescribed, continues to have severe discomfort. She was recommended to see pain management, would like referral. Taking muscle relaxer as needed, cannot tolerate a higher dose. Has tried gabapentin in the past and it was very sedating but willing to try again. Cannot take NSAIDs for six months post-op due to possible delay in bone healing.    The following portions of the patient's history were reviewed and updated as appropriate: allergies, current medications, past family history, past medical history, past social history, past surgical history and problem list.    Review of Systems   Constitutional: Positive for activity change and fatigue.   Musculoskeletal: Positive for arthralgias, back pain, myalgias and neck pain.   Neurological: Positive for numbness.       Vitals:    06/15/18 1306   BP: 124/82   Pulse: 95   Resp: 14   Temp: 98.9 °F (37.2 °C)   SpO2: 98%   Weight: 74.4 kg (164 lb)   Height: 167.6 cm (66\")         Objective   Physical Exam   Constitutional: She is oriented to person, place, and time. Vital signs are normal. She appears well-developed and well-nourished. She is active. She does not appear ill. No distress.   HENT:   Head: Normocephalic and atraumatic.   Right Ear: Hearing, tympanic membrane, external ear and ear canal normal.   Left Ear: Hearing, tympanic membrane, external ear and ear canal normal.   Mouth/Throat: Uvula is midline, oropharynx is clear and moist and mucous membranes are normal. " Mucous membranes are not dry. Normal dentition.   Eyes: EOM are normal. No scleral icterus.   Neck: Neck supple.       Pulmonary/Chest: Effort normal.   Neurological: She is alert and oriented to person, place, and time. No cranial nerve deficit.   Skin: Skin is warm. She is not diaphoretic.   Psychiatric: Her behavior is normal. Judgment and thought content normal. Cognition and memory are normal. She exhibits a depressed mood.   Pressured but not rapid speech   Nursing note and vitals reviewed.        Assessment/Plan     Problem List Items Addressed This Visit        Musculoskeletal and Integument    Cervical disc herniation - Primary    Relevant Medications    cyclobenzaprine (FLEXERIL) 5 MG tablet    oxyCODONE (ROXICODONE) 5 MG immediate release tablet    gabapentin (NEURONTIN) 100 MG capsule    Other Relevant Orders    Ambulatory Referral to Pain Management          · YSABEL requested. Discussed limited rx for narcotic, only seven days prescribed. Follow up with neurosurgery as needed, can consider orthopedics in future for other joint issues. Also consider repeat EMG/NCV. Trial gabapentin, trying to decrease need for narcotic. Cannot take NSAIDs for many months after surgery due to possible delay in healing. She declines further physical therapy at this time (feels not helpful)    Return for As Needed.    Martine Evans MD    Please note that portions of this note may have been completed with a voice recognition program. Efforts were made to edit dictation, but occasionally words are mistranscribed.

## 2018-06-16 NOTE — PROGRESS NOTES
"Subjective    Heidi Franklin is a 37 y.o. female here for:  Chief Complaint   Patient presents with   • Follow-up     Follow up after spine surgery     History of Present Illness       The following portions of the patient's history were reviewed and updated as appropriate: allergies, current medications, past family history, past medical history, past social history, past surgical history and problem list.    Review of Systems    Vitals:    06/15/18 1306   BP: 124/82   Pulse: 95   Resp: 14   Temp: 98.9 °F (37.2 °C)   SpO2: 98%   Weight: 74.4 kg (164 lb)   Height: 167.6 cm (66\")         Objective   Physical Exam    No results found for: HGBA1C      Assessment/Plan     Problem List Items Addressed This Visit        Musculoskeletal and Integument    Cervical disc herniation - Primary    Relevant Medications    cyclobenzaprine (FLEXERIL) 5 MG tablet    oxyCODONE (ROXICODONE) 5 MG immediate release tablet    gabapentin (NEURONTIN) 100 MG capsule    Other Relevant Orders    Ambulatory Referral to Pain Management          ·     Return for As Needed.    Martine Evans MD    Please note that portions of this note may have been completed with a voice recognition program. Efforts were made to edit dictation, but occasionally words are mistranscribed.  "

## 2018-08-13 ENCOUNTER — TRANSCRIBE ORDERS (OUTPATIENT)
Dept: ADMINISTRATIVE | Facility: HOSPITAL | Age: 38
End: 2018-08-13

## 2018-08-13 ENCOUNTER — TRANSCRIBE ORDERS (OUTPATIENT)
Dept: SURGERY | Facility: CLINIC | Age: 38
End: 2018-08-13

## 2018-08-13 DIAGNOSIS — Z12.39 ENCOUNTER FOR SCREENING FOR MALIGNANT NEOPLASM OF BREAST: Primary | ICD-10-CM

## 2018-09-18 ENCOUNTER — TELEPHONE (OUTPATIENT)
Dept: INTERNAL MEDICINE | Facility: CLINIC | Age: 38
End: 2018-09-18

## 2018-09-18 ENCOUNTER — HOSPITAL ENCOUNTER (OUTPATIENT)
Dept: MAMMOGRAPHY | Facility: HOSPITAL | Age: 38
Discharge: HOME | End: 2018-09-18
Attending: SURGERY | Admitting: SURGERY

## 2018-09-18 DIAGNOSIS — Z12.39 ENCOUNTER FOR SCREENING FOR MALIGNANT NEOPLASM OF BREAST: ICD-10-CM

## 2018-09-18 PROCEDURE — 77063 BREAST TOMOSYNTHESIS BI: CPT

## 2018-09-18 PROCEDURE — 77067 SCR MAMMO BI INCL CAD: CPT

## 2018-09-18 NOTE — TELEPHONE ENCOUNTER
Patient left message on medical records line--she is wanting some medical records. Attempted to call her back, but her vm has not been set up. She just needs to stop in and sign a release.

## 2018-09-19 ENCOUNTER — TREATMENT (OUTPATIENT)
Dept: PHYSICAL THERAPY | Facility: CLINIC | Age: 38
End: 2018-09-19

## 2018-09-19 ENCOUNTER — TRANSCRIBE ORDERS (OUTPATIENT)
Dept: PHYSICAL THERAPY | Facility: CLINIC | Age: 38
End: 2018-09-19

## 2018-09-19 DIAGNOSIS — M54.2 PAIN, NECK: Primary | ICD-10-CM

## 2018-09-19 DIAGNOSIS — M54.5 LOW BACK PAIN, UNSPECIFIED BACK PAIN LATERALITY, UNSPECIFIED CHRONICITY, WITH SCIATICA PRESENCE UNSPECIFIED: ICD-10-CM

## 2018-09-19 DIAGNOSIS — G89.29 CHRONIC BILATERAL LOW BACK PAIN WITHOUT SCIATICA: ICD-10-CM

## 2018-09-19 DIAGNOSIS — M54.50 CHRONIC BILATERAL LOW BACK PAIN WITHOUT SCIATICA: ICD-10-CM

## 2018-09-19 PROCEDURE — 97110 THERAPEUTIC EXERCISES: CPT | Performed by: PHYSICAL THERAPIST

## 2018-09-19 PROCEDURE — 97162 PT EVAL MOD COMPLEX 30 MIN: CPT | Performed by: PHYSICAL THERAPIST

## 2018-09-19 NOTE — PROGRESS NOTES
Physical Therapy Initial Evaluation and Plan of Care      Patient: Heidi Franklin   : 1980  Diagnosis/ICD-10 Code:  No primary diagnosis found.  Referring practitioner: VAHID Perez    Subjective Evaluation    History of Present Illness  Mechanism of injury: All of her sxs started insidiously.     C/S fusion C5-6 2017,  Second surgery on 18 C4-C5.      Neck pain is constant and shoulder pain is constant as well.    She has stopped driving due to neck mobility.     Low back pain started after first surgery in 2017.  Pain into the R hip,  Walking has been difficult as well.       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  Pain  Current pain ratin (9/10 back and hips,  neck and shoulders 8/10 pain)  At best pain ratin  Location: Back, shoulders neck , hips  Aggravating factors: overhead activity, repetitive movement, outstretched reach, prolonged positioning and movement  Progression: worsening    Hand dominance: right    Diagnostic Tests  MRI studies: abnormal    Treatments  Previous treatment: physical therapy (3 visits before she was referred to neuro)           Objective       Postural Observations  Seated posture: fair  Standing posture: fair    Additional Postural Observation Details  Resting slightly R rotation and L sidebent 5-10 degrees.     Active Range of Motion   Cervical/Thoracic Spine   Cervical    Flexion: 20 degrees with pain  Extension: 40 degrees   Left lateral flexion: 12 degrees with pain  Right lateral flexion: 23 degrees with pain  Left rotation: 47 degrees with pain  Right rotation: 40 degrees with pain  Left Shoulder   Flexion: 180 degrees     Right Shoulder   Flexion: 180 degrees     Lumbar   Flexion: Active lumbar flexion: FT to foot. pain and relief at the same time.      Strength/Myotome Testing     Left Wrist/Hand      (2nd hand position)     Trial 1: 32 lbs    Trial 2: 23 lbs    Trial 3: 33 lbs    Average: 29.33 lbs    Right Wrist/Hand      (2nd  hand position)     Trial 1: 25 lbs    Trial 2: 28 lbs    Trial 3: 33 lbs    Average: 28.67 lbs    Lumbar   Left   Heel walk: normal  Toe walk: normal    Right   Heel walk: normal  Toe walk: normal    Left Hip   Planes of Motion   Flexion: 3+  Extension: 3-  Abduction: 3+  External rotation: 3+    Right Hip   Planes of Motion   Flexion: 4-  Extension: 3-  Abduction: 3+  External rotation: 3+    Additional Strength Details  Balance and coordination was slightly limited with heel walking and toe walking      Step touch:   Independent without LOB or assistance.      Bridge:  Limited by neck strength and endurance.     Functional Assessment     Single Leg Stance   Left: 15 (pt with LE slightly shakey due to weakness) seconds  Right: 20 (LE shakey due to weakness) seconds         Assessment & Plan     Assessment  Impairments: abnormal muscle tone, abnormal or restricted ROM, activity intolerance, impaired physical strength, lacks appropriate home exercise program and pain with function  Assessment details: Patient is a 38 year old female who comes to physical therapy with chronic neck and back pain. Signs and symptoms are consistent with generalized decreased mobility and decreased strength noted in the spine.  Pt would benefit from learning PREP and developing a strength training activity.  The patient currently has pain, decreased ROM, decreased strength, and inability to perform all essential functional activities. Pt will benefit from skilled PT services to address the above issues.     Prognosis: fair  Functional Limitations: carrying objects, lifting, pulling, pushing, uncomfortable because of pain, sitting, standing, stooping and unable to perform repetitive tasks  Goals  Plan Goals: SHORT TERM GOALS:     2 weeks  1. Pt independent with HEP  2. Pt to demonstrate trunk AROM 50-75% of expected norms to allow for improved ability to perform ADL's  3. Pt to demonstrate bilateral hip strength 4/5 in all planes to  improved stability of the core/trunk     LONG TERM GOALS:   6 weeks  1. Pt to demonstrate trunk AROM 50-75% of expected norms to allow for improved ability to perform functional activities  2. Pt to demonstrate ability to perform full functional squat with good form and without increased pain in the low back   3. Pt to report being able to work full shift or work in the home without increase in pain in the back  4. Pt to report cessation of pain/numbness/tingling into the bilateral leg to show decreased nerve compression      Plan  Therapy options: will be seen for skilled physical therapy services  Planned modality interventions: cryotherapy, thermotherapy (hydrocollator packs) and electrical stimulation/Russian stimulation  Planned therapy interventions: abdominal trunk stabilization, manual therapy, spinal/joint mobilization, soft tissue mobilization, strengthening, stretching, therapeutic activities, functional ROM exercises, flexibility, body mechanics training, neuromuscular re-education and postural training  Duration in visits: 12  Treatment plan discussed with: patient  Plan details: Pt will be seen 2-3 x / week.  Assess Pt response to PREP, posture and body mechanics.         Manual Therapy:         mins  65626;  Therapeutic Exercise:    24     mins  82042;     Neuromuscular Ro:        mins  17544;    Therapeutic Activity:          mins  19181;     Gait Training:           mins  42930;     Ultrasound:          mins  57234;    Electrical Stimulation:         mins  41195 ( );  Dry Needling          mins self-pay    Timed Treatment:   24   mins   Total Treatment:     56   mins    PT SIGNATURE: Lasha Brunner, PT   DATE TREATMENT INITIATED: 9/19/2018    Initial Certification  Certification Period: 12/18/2018  I certify that the therapy services are furnished while this patient is under my care.  The services outlined above are required by this patient, and will be reviewed every 90  days.     PHYSICIAN: Lynn Sutherland      DATE:     Please sign and return via fax to  .. Thank you, [facility].

## 2018-09-25 ENCOUNTER — OFFICE VISIT (OUTPATIENT)
Dept: SURGERY | Facility: CLINIC | Age: 38
End: 2018-09-25

## 2018-09-25 VITALS
WEIGHT: 164.8 LBS | TEMPERATURE: 98.8 F | HEART RATE: 119 BPM | OXYGEN SATURATION: 99 % | SYSTOLIC BLOOD PRESSURE: 128 MMHG | BODY MASS INDEX: 26.48 KG/M2 | DIASTOLIC BLOOD PRESSURE: 88 MMHG | HEIGHT: 66 IN

## 2018-09-25 DIAGNOSIS — Z98.890 HISTORY OF BREAST LUMP/MASS EXCISION: Primary | ICD-10-CM

## 2018-09-25 PROCEDURE — 99213 OFFICE O/P EST LOW 20 MIN: CPT | Performed by: SURGERY

## 2018-09-25 RX ORDER — OXYCODONE HYDROCHLORIDE AND ACETAMINOPHEN 5; 325 MG/1; MG/1
TABLET ORAL
Refills: 0 | COMMUNITY
Start: 2018-08-13 | End: 2018-12-20

## 2018-09-25 NOTE — PROGRESS NOTES
"Patient: Heidi Franklin    YOB: 1980    Date: 09/25/2018    Primary Care Provider: Martine Evans MD    Chief Complaint   Patient presents with   • Follow-up       History: I saw the patient in the office today for a follow up left breast.  Patient had a benign left biopsy performed 06/2017. Her mammogram in 08/2018 was a birads 2.  She still complains of a painful nodule in the right axilla.    The following portions of the patient's history were reviewed and updated as appropriate: allergies, current medications, past family history, past medical history, past social history, past surgical history and problem list.      Review of Systems   Constitutional: Negative for chills, fever and unexpected weight change.   HENT: Negative for hearing loss, trouble swallowing and voice change.    Eyes: Negative for visual disturbance.   Respiratory: Negative for apnea, cough, chest tightness, shortness of breath and wheezing.    Cardiovascular: Negative for chest pain, palpitations and leg swelling.   Gastrointestinal: Negative for abdominal distention, abdominal pain, anal bleeding, blood in stool, constipation, diarrhea, nausea, rectal pain and vomiting.   Endocrine: Negative for cold intolerance and heat intolerance.   Genitourinary: Negative for difficulty urinating, dysuria and flank pain.   Musculoskeletal: Negative for back pain and gait problem.   Skin: Negative for color change, rash and wound.   Neurological: Negative for dizziness, syncope, speech difficulty, weakness, light-headedness, numbness and headaches.   Hematological: Negative for adenopathy. Does not bruise/bleed easily.   Psychiatric/Behavioral: Negative for confusion. The patient is not nervous/anxious.        Vital Signs  Vitals:    09/25/18 1321   BP: 128/88   Pulse: 119   Temp: 98.8 °F (37.1 °C)   TempSrc: Temporal Artery    SpO2: 99%   Weight: 74.8 kg (164 lb 12.8 oz)   Height: 167.6 cm (66\")       Allergies:  Allergies "   Allergen Reactions   • Citalopram Other (See Comments)       Medications:    Current Outpatient Prescriptions:   •  acetaminophen (TYLENOL) 500 MG tablet, Take 500 mg by mouth Every 6 (Six) Hours As Needed for mild pain (1-3)., Disp: , Rfl:   •  cyclobenzaprine (FLEXERIL) 5 MG tablet, Take 1 tablet by mouth 3 (Three) Times a Day As Needed for Muscle Spasms., Disp: 90 tablet, Rfl: 5  •  diazePAM (VALIUM) 5 MG tablet, Take 5 mg by mouth 3 (Three) Times a Day As Needed for Muscle Spasms., Disp: , Rfl: 0  •  gabapentin (NEURONTIN) 100 MG capsule, Take 1 capsule by mouth 3 (Three) Times a Day., Disp: 90 capsule, Rfl: 1  •  Levonorgestrel (MIRENA, 52 MG, IU), by Intrauterine route., Disp: , Rfl:   •  oxyCODONE-acetaminophen (PERCOCET) 5-325 MG per tablet, take 1 tablet by mouth daily if needed for pain, Disp: , Rfl: 0  •  SUMAtriptan (IMITREX) 100 MG tablet, Take 1 tablet by mouth 1 (One) Time As Needed for migraine for up to 1 dose., Disp: 9 tablet, Rfl: 5    Physical Exam:   General Appearance:    Alert, cooperative, in no acute distress   Head:    Normocephalic, without obvious abnormality, atraumatic   Lungs:     Clear to auscultation,respirations regular, even and                  unlabored    Heart:    Regular rhythm and normal rate, normal S1 and S2, no            murmur, no gallop, no rub, no click   Abdomen:     Normal bowel sounds, no masses, no organomegaly, soft        non-tender, non-distended, no guarding, no rebound                tenderness   Extremities:   Moves all extremities well, no edema, no cyanosis, no             redness   Pulses:   Pulses palpable and equal bilaterally   Skin:   No bleeding, bruising or rash, both breasts were palpably normal except for nodularity, there is a painful nodule in the right axilla consistent with subcutaneous lipoma       Results Review:   I reviewed the patient's new clinical results.  I reviewed the patient's new imaging results and agree with the  interpretation.  I reviewed the patient's other test results and agree with the interpretation     Assessment/Plan     1. History of breast lump/mass excision      In short, ultrasonography was performed today and was within normal limits except for a small lipoma of the right axilla.  She needs nothing further from a breast standpoint but this lipoma is causing her discomfort, risk and benefits of operative versus nonoperative intervention of been discussed with the patient, she understands and agrees, and wishes to proceed with the above-mentioned surgical treatment plan of lipoma removal under local anesthetic.    Electronically signed by Rizwan Perera MD  09/25/18    Portions of this note have been scribed for Rizwan Perera MD by Idania Triana. 9/25/2018  3:42 PM

## 2018-09-28 ENCOUNTER — TREATMENT (OUTPATIENT)
Dept: PHYSICAL THERAPY | Facility: CLINIC | Age: 38
End: 2018-09-28

## 2018-09-28 DIAGNOSIS — M54.50 CHRONIC BILATERAL LOW BACK PAIN WITHOUT SCIATICA: ICD-10-CM

## 2018-09-28 DIAGNOSIS — M54.2 PAIN, NECK: Primary | ICD-10-CM

## 2018-09-28 DIAGNOSIS — G89.29 CHRONIC BILATERAL LOW BACK PAIN WITHOUT SCIATICA: ICD-10-CM

## 2018-09-28 PROCEDURE — 97110 THERAPEUTIC EXERCISES: CPT | Performed by: PHYSICAL THERAPIST

## 2018-09-28 NOTE — PROGRESS NOTES
Physical Therapy Daily Progress Note      Visit # : 2    Heidi Franklin reports 7/10 pain today at rest.  Pt reports she has been sore after the last PT session.  Neck pain the primary area.         Objective Pt presents to PT today with no distress noted.     Pt with increased activity of HEP with no elevation in pain.  Patient does fatigue quickly with activity.       See Exercise, Manual, and Modality Logs for complete treatment.     Assessment/Plan  Pt with fatigue more of an issue than pain today.       Progress per Plan of Care             Manual Therapy:         mins  61409;  Therapeutic Exercise:    54     mins  29069;     Neuromuscular Ro:        mins  81421;    Therapeutic Activity:          mins  96300;     Gait Training:        ___  mins  14572;     Ultrasound:          mins  16390;    Electrical Stimulation:         mins  27908 ( );  Dry Needling          mins self-pay    Timed Treatment:   54   mins   Total Treatment:     54   mins    Lasha Brunner, PT  Physical Therapist

## 2018-10-01 ENCOUNTER — TREATMENT (OUTPATIENT)
Dept: PHYSICAL THERAPY | Facility: CLINIC | Age: 38
End: 2018-10-01

## 2018-10-01 DIAGNOSIS — G89.29 CHRONIC BILATERAL LOW BACK PAIN WITHOUT SCIATICA: ICD-10-CM

## 2018-10-01 DIAGNOSIS — M54.2 PAIN, NECK: Primary | ICD-10-CM

## 2018-10-01 DIAGNOSIS — M54.50 CHRONIC BILATERAL LOW BACK PAIN WITHOUT SCIATICA: ICD-10-CM

## 2018-10-01 PROCEDURE — 97110 THERAPEUTIC EXERCISES: CPT | Performed by: PHYSICAL THERAPIST

## 2018-10-01 PROCEDURE — 97530 THERAPEUTIC ACTIVITIES: CPT | Performed by: PHYSICAL THERAPIST

## 2018-10-01 NOTE — PROGRESS NOTES
Physical Therapy Daily Progress Note      Visit # : 3    Heidi Franklin reports 7/10 pain today at rest.  Back and neck painful.         Objective Pt presents to PT today with minimal distress.     Supine 3/10 neck pain.  Back 2/10 hooklying    Post PT 0/10 pain noted in the spine post PT activity.       See Exercise, Manual, and Modality Logs for complete treatment.     Assessment/Plan  Pt with good response to PT post PT activity.  She fatigues quickly in the spine.       Progress per Plan of Care             Manual Therapy:         mins  62185;  Therapeutic Exercise:    43     mins  94953;     Neuromuscular Ro:        mins  87795;    Therapeutic Activity:     11     mins  50760;     Gait Training:        ___  mins  50913;     Ultrasound:          mins  59887;    Electrical Stimulation:         mins  17799 ( );  Dry Needling          mins self-pay    Timed Treatment:   54   mins   Total Treatment:     54   mins    Lasha Brunner, PT  Physical Therapist

## 2018-10-03 ENCOUNTER — PROCEDURE VISIT (OUTPATIENT)
Dept: SURGERY | Facility: CLINIC | Age: 38
End: 2018-10-03

## 2018-10-03 VITALS
WEIGHT: 164 LBS | TEMPERATURE: 98.4 F | DIASTOLIC BLOOD PRESSURE: 84 MMHG | BODY MASS INDEX: 26.36 KG/M2 | HEART RATE: 68 BPM | SYSTOLIC BLOOD PRESSURE: 126 MMHG | OXYGEN SATURATION: 99 % | HEIGHT: 66 IN | RESPIRATION RATE: 18 BRPM

## 2018-10-03 DIAGNOSIS — R22.31 MASS OF AXILLA, RIGHT: Primary | ICD-10-CM

## 2018-10-03 DIAGNOSIS — D17.21 LIPOMA OF RIGHT UPPER EXTREMITY: Primary | ICD-10-CM

## 2018-10-03 PROCEDURE — 24075 EXC ARM/ELBOW LES SC < 3 CM: CPT | Performed by: SURGERY

## 2018-10-04 NOTE — PROGRESS NOTES
Location:right axilla    Procedure: Excision lipoma      I recommend excision. Procedure and the risks and benefits were explained including bleeding and infection. The patient understands these and wishes to proceed.     The patient was brought to the procedure room. Consent and time out were performed. The area was prepped and draped in the usual fashion. 1% lidocaine with epinephrine was infused locally. A incision was made over the fatty tumor suspected to be a lipoma. Full thickness excision was performed. The fatty tumor size was 1.5 cm. The wound was closed in layers with interrupted simple vicryl and Nylon for the skin. Wound closure size was 2 cm. There were no complications and the patient tolerated the procedure well. Hemostasis was well controlled with pressure and there was minimal blood loss. Wound instructions were given.

## 2018-10-10 ENCOUNTER — OFFICE VISIT (OUTPATIENT)
Dept: SURGERY | Facility: CLINIC | Age: 38
End: 2018-10-10

## 2018-10-10 DIAGNOSIS — Z48.89 POSTOPERATIVE VISIT: Primary | ICD-10-CM

## 2018-10-10 PROCEDURE — 99024 POSTOP FOLLOW-UP VISIT: CPT | Performed by: SURGERY

## 2018-10-10 NOTE — PROGRESS NOTES
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  Patient has no complaints.     The following portions of the patient's history were reviewed and updated as appropriate: allergies, current medications, past family history, past medical history, past social history, past surgical history and problem list.      Review of Systems    Vital Signs  There were no vitals filed for this visit.    Allergies:  Allergies   Allergen Reactions   • Citalopram Other (See Comments)       Medications:    Current Outpatient Prescriptions:   •  acetaminophen (TYLENOL) 500 MG tablet, Take 500 mg by mouth Every 6 (Six) Hours As Needed for mild pain (1-3)., Disp: , Rfl:   •  cyclobenzaprine (FLEXERIL) 5 MG tablet, Take 1 tablet by mouth 3 (Three) Times a Day As Needed for Muscle Spasms., Disp: 90 tablet, Rfl: 5  •  diazePAM (VALIUM) 5 MG tablet, Take 5 mg by mouth 3 (Three) Times a Day As Needed for Muscle Spasms., Disp: , Rfl: 0  •  gabapentin (NEURONTIN) 100 MG capsule, Take 1 capsule by mouth 3 (Three) Times a Day., Disp: 90 capsule, Rfl: 1  •  Levonorgestrel (MIRENA, 52 MG, IU), by Intrauterine route., Disp: , Rfl:   •  oxyCODONE-acetaminophen (PERCOCET) 5-325 MG per tablet, take 1 tablet by mouth daily if needed for pain, Disp: , Rfl: 0  •  SUMAtriptan (IMITREX) 100 MG tablet, Take 1 tablet by mouth 1 (One) Time As Needed for migraine for up to 1 dose., Disp: 9 tablet, Rfl: 5    Physical Exam:   General Appearance:    Alert, cooperative, in no acute distress   Head:    Normocephalic, without obvious abnormality, atraumatic   Lungs:     Clear to auscultation,respirations  regular, even and                  unlabored    Heart:    Regular rhythm and normal rate, normal S1 and S2, no            murmur, no gallop, no rub, no click   Abdomen:     Normal bowel sounds, no masses, no organomegaly, soft        non-tender, non-distended, no guarding, no rebound                tenderness   Extremities:   Moves all extremities well, no edema, no cyanosis, no             redness   Pulses:   Pulses palpable and equal bilaterally   Skin:   No bleeding, bruising or rash       Results Review:   I reviewed the patient's new clinical results.  I reviewed the patient's new imaging results and agree with the interpretation.  I reviewed the patient's other test results and agree with the interpretation     Assessment/Plan     1. Postoperative visit      I did have a detailed discussion with the patient in the office today, pathology report actually showed a small area of ectopic breast tissue with fibrocystic changes.  There was no evidence of carcinoma.  The wound is healed nicely, I need to see the patient back in the office only if she has further problems.    Electronically signed by Rizwan Perera MD  10/10/18    Portions of this note have been scribed for Rizwan Perera MD by Idania Triana. 10/10/2018  4:04 PM

## 2018-10-11 ENCOUNTER — TREATMENT (OUTPATIENT)
Dept: PHYSICAL THERAPY | Facility: CLINIC | Age: 38
End: 2018-10-11

## 2018-10-11 DIAGNOSIS — M54.50 CHRONIC BILATERAL LOW BACK PAIN WITHOUT SCIATICA: ICD-10-CM

## 2018-10-11 DIAGNOSIS — M54.2 PAIN, NECK: Primary | ICD-10-CM

## 2018-10-11 DIAGNOSIS — G89.29 CHRONIC BILATERAL LOW BACK PAIN WITHOUT SCIATICA: ICD-10-CM

## 2018-10-11 PROCEDURE — 97140 MANUAL THERAPY 1/> REGIONS: CPT | Performed by: PHYSICAL THERAPIST

## 2018-10-11 PROCEDURE — 97110 THERAPEUTIC EXERCISES: CPT | Performed by: PHYSICAL THERAPIST

## 2018-10-11 NOTE — PROGRESS NOTES
Physical Therapy Daily Progress Note      Visit # : 4    Heidi Franklin reports 1-2/10 pain today at rest.  Pt reports she underwent a procedure to remove a lump under her R axillary area. Pt reports she feels better in the arm now that the lump is removed.       Objective Pt presents to PT today with no distress noted.     R UE has full ROM.    Nerve glide R UE is WNL's with ROM and sensitivity.    T/S mobility improved with manual therapy.       See Exercise, Manual, and Modality Logs for complete treatment.     Assessment/Plan  Pt with improved T/S mobility after today treatment.       Progress per Plan of Care  Check response to T/S mobility activity and C/S Isometrics.  She was given UT stretch as well.            Manual Therapy:    13     mins  85575;  Therapeutic Exercise:    40     mins  15991;     Neuromuscular Ro:        mins  15968;    Therapeutic Activity:          mins  85833;     Gait Training:        ___  mins  42767;     Ultrasound:          mins  48621;    Electrical Stimulation:         mins  45519 ( );  Dry Needling          mins self-pay    Timed Treatment:   53   mins   Total Treatment:     53   mins    Lasha Brunner, PT  Physical Therapist

## 2018-10-15 ENCOUNTER — TREATMENT (OUTPATIENT)
Dept: PHYSICAL THERAPY | Facility: CLINIC | Age: 38
End: 2018-10-15

## 2018-10-15 DIAGNOSIS — M54.50 CHRONIC BILATERAL LOW BACK PAIN WITHOUT SCIATICA: ICD-10-CM

## 2018-10-15 DIAGNOSIS — G89.29 CHRONIC BILATERAL LOW BACK PAIN WITHOUT SCIATICA: ICD-10-CM

## 2018-10-15 DIAGNOSIS — M54.2 PAIN, NECK: Primary | ICD-10-CM

## 2018-10-15 PROCEDURE — 97530 THERAPEUTIC ACTIVITIES: CPT | Performed by: PHYSICAL THERAPIST

## 2018-10-15 PROCEDURE — 97110 THERAPEUTIC EXERCISES: CPT | Performed by: PHYSICAL THERAPIST

## 2018-10-15 NOTE — PROGRESS NOTES
Physical Therapy Daily Progress Note      Visit # : 5    Heidi Franklin reports 8/10 pain today at rest.  Pt reports the weather has not helped.  R LBP and R Hip recently has been hurting.          Objective Pt presents to PT today with minimal distress.     SLR - in the LE.  Pt with full ROM of the hip and the lumbar spine.     Palpation:  R L/S PS MM hypertonicity.       See Exercise, Manual, and Modality Logs for complete treatment.     Assessment/Plan  Pt with less pain noted after PT.  Pt with lumbar spine pain more from some abdominal weakness.       Progress per Plan of Care  Continue to progress function.            Manual Therapy:         mins  78161;  Therapeutic Exercise:    43     mins  48815;     Neuromuscular Ro:    11    mins  46293;    Therapeutic Activity:          mins  35404;     Gait Training:        ___  mins  97730;     Ultrasound:          mins  21925;    Electrical Stimulation:         mins  88771 ( );  Dry Needling          mins self-pay    Timed Treatment:   54   mins   Total Treatment:     58   mins    Lasha Brunner, PT  Physical Therapist

## 2018-10-17 ENCOUNTER — TREATMENT (OUTPATIENT)
Dept: PHYSICAL THERAPY | Facility: CLINIC | Age: 38
End: 2018-10-17

## 2018-10-17 DIAGNOSIS — M54.2 PAIN, NECK: Primary | ICD-10-CM

## 2018-10-17 DIAGNOSIS — M54.50 CHRONIC BILATERAL LOW BACK PAIN WITHOUT SCIATICA: ICD-10-CM

## 2018-10-17 DIAGNOSIS — G89.29 CHRONIC BILATERAL LOW BACK PAIN WITHOUT SCIATICA: ICD-10-CM

## 2018-10-17 PROCEDURE — 97110 THERAPEUTIC EXERCISES: CPT | Performed by: PHYSICAL THERAPIST

## 2018-10-17 PROCEDURE — 97140 MANUAL THERAPY 1/> REGIONS: CPT | Performed by: PHYSICAL THERAPIST

## 2018-10-17 NOTE — PROGRESS NOTES
Physical Therapy Daily Progress Note      Visit # : 6    Heidi Franklin reports 7/10 pain today at rest in the neck and 1-2/10 in the back.  Pt with elevated pain in the neck today but the back is less painful.          Objective Pt presents to PT today with minimal distress noted.     Pt with R UT only slight hypertonicity noted.  Pt with pain more unrelated to MM tension and position.        See Exercise, Manual, and Modality Logs for complete treatment.     Assessment/Plan  I highly suspect her ergonomic setup at work is contributing to her sxs.        Progress strengthening /stabilization /functional activity  Check pictures of her office setup and ergonomics.            Manual Therapy:    11     mins  78062;  Therapeutic Exercise:    40     mins  75366;     Neuromuscular Ro:        mins  48848;    Therapeutic Activity:          mins  73553;     Gait Training:        ___  mins  26433;     Ultrasound:          mins  90207;    Electrical Stimulation:         mins  86003 ( );  Dry Needling          mins self-pay    Timed Treatment:   51   mins   Total Treatment:     51   mins    Lasha Brunner, PT  Physical Therapist

## 2018-10-25 ENCOUNTER — TREATMENT (OUTPATIENT)
Dept: PHYSICAL THERAPY | Facility: CLINIC | Age: 38
End: 2018-10-25

## 2018-10-25 DIAGNOSIS — G89.29 CHRONIC BILATERAL LOW BACK PAIN WITHOUT SCIATICA: ICD-10-CM

## 2018-10-25 DIAGNOSIS — M54.2 PAIN, NECK: Primary | ICD-10-CM

## 2018-10-25 DIAGNOSIS — M54.50 CHRONIC BILATERAL LOW BACK PAIN WITHOUT SCIATICA: ICD-10-CM

## 2018-10-25 PROCEDURE — 97530 THERAPEUTIC ACTIVITIES: CPT | Performed by: PHYSICAL THERAPIST

## 2018-10-25 PROCEDURE — 97110 THERAPEUTIC EXERCISES: CPT | Performed by: PHYSICAL THERAPIST

## 2018-10-26 NOTE — PROGRESS NOTES
Physical Therapy Daily Progress Note      Visit # : 7    Heidi Franklin reports 4-5/10 pain today at rest.  Pt reports she has the results of her lumbar spine MRI and she is worried about the results.         Objective Pt presents to PT today with minimal guarding.     Pt has no elevated pain after the HEP activity.   Pt needs some cues for HEP reproduction.       See Exercise, Manual, and Modality Logs for complete treatment.     Assessment/Plan  Pt with pain elevated from illness this week.  Pt is worried about the MRI results but the unloaded exercises don't seem to aggravate any sxs.       Progress per Plan of Care             Manual Therapy:         mins  51018;  Therapeutic Exercise:    40     mins  84635;     Neuromuscular Ro:        mins  91534;    Therapeutic Activity:     12     mins  37410;     Gait Training:        ___  mins  96691;     Ultrasound:          mins  92650;    Electrical Stimulation:         mins  94286 ( );  Dry Needling          mins self-pay    Timed Treatment:   52   mins   Total Treatment:     52   mins    Lasha Brunner, PT  Physical Therapist

## 2018-11-01 ENCOUNTER — TREATMENT (OUTPATIENT)
Dept: PHYSICAL THERAPY | Facility: CLINIC | Age: 38
End: 2018-11-01

## 2018-11-01 PROCEDURE — 97110 THERAPEUTIC EXERCISES: CPT | Performed by: PHYSICAL THERAPIST

## 2018-11-01 NOTE — PROGRESS NOTES
Physical Therapy Daily Progress Note        Heidi Franklin reports 8/10 pain today at rest in the cervical spine.  Pt reports neck is hurting more today than low back. Pt states she is trying not to take any pain medication because she feels like she does not like to be reliant on the medication.         Objective Pt present to PT today with minimal distress at rest.     Pt tolerated exercise well today with no increased pain. Pt with less stiffness after exercise and mobility activities today.       See Exercise, Manual, and Modality Logs for complete treatment.     Assessment/Plan  Pt continues to have short term relief from PT but symptoms and soreness return that evening or the next day. Pt may benefit from continued mobility and stabilization training to relieve pain in neck and back.       Progress per Plan of Care  Continue as tolerated.            Manual Therapy:         mins  51261;  Therapeutic Exercise:    52     mins  11626;     Neuromuscular Ro:        mins  12369;    Therapeutic Activity:          mins  64109;     Gait Training:        ___  mins  39211;     Ultrasound:          mins  02854;    Electrical Stimulation:         mins  64782 ( );  Dry Needling          mins self-pay    Timed Treatment:   52   mins   Total Treatment:     53   mins    Lasha Brunner, PT  Physical Therapist

## 2018-12-20 ENCOUNTER — OFFICE VISIT (OUTPATIENT)
Dept: INTERNAL MEDICINE | Facility: CLINIC | Age: 38
End: 2018-12-20

## 2018-12-20 VITALS
SYSTOLIC BLOOD PRESSURE: 118 MMHG | WEIGHT: 165.12 LBS | RESPIRATION RATE: 16 BRPM | HEART RATE: 98 BPM | TEMPERATURE: 98.4 F | OXYGEN SATURATION: 98 % | BODY MASS INDEX: 26.54 KG/M2 | DIASTOLIC BLOOD PRESSURE: 80 MMHG | HEIGHT: 66 IN

## 2018-12-20 DIAGNOSIS — Z23 NEED FOR HEPATITIS A VACCINATION: ICD-10-CM

## 2018-12-20 DIAGNOSIS — Z00.00 ANNUAL PHYSICAL EXAM: Primary | ICD-10-CM

## 2018-12-20 DIAGNOSIS — F32.A DEPRESSION, UNSPECIFIED DEPRESSION TYPE: ICD-10-CM

## 2018-12-20 DIAGNOSIS — Z83.3 FAMILY HISTORY OF TYPE 2 DIABETES MELLITUS: ICD-10-CM

## 2018-12-20 DIAGNOSIS — Z23 NEED FOR TDAP VACCINATION: ICD-10-CM

## 2018-12-20 DIAGNOSIS — R53.83 FATIGUE, UNSPECIFIED TYPE: ICD-10-CM

## 2018-12-20 LAB
ALBUMIN SERPL-MCNC: 4.7 G/DL (ref 3.5–5)
ALBUMIN/GLOB SERPL: 1.6 G/DL (ref 1–2)
ALP SERPL-CCNC: 96 U/L (ref 38–126)
ALT SERPL-CCNC: 26 U/L (ref 13–69)
AST SERPL-CCNC: 20 U/L (ref 15–46)
BASOPHILS # BLD AUTO: 0.04 10*3/MM3 (ref 0–0.2)
BASOPHILS NFR BLD AUTO: 0.3 % (ref 0–2.5)
BILIRUB SERPL-MCNC: 0.6 MG/DL (ref 0.2–1.3)
BUN SERPL-MCNC: 11 MG/DL (ref 7–20)
BUN/CREAT SERPL: 18.3 (ref 7.1–23.5)
CALCIUM SERPL-MCNC: 9.9 MG/DL (ref 8.4–10.2)
CHLORIDE SERPL-SCNC: 104 MMOL/L (ref 98–107)
CHOLEST SERPL-MCNC: 155 MG/DL (ref 0–199)
CO2 SERPL-SCNC: 27 MMOL/L (ref 26–30)
CREAT SERPL-MCNC: 0.6 MG/DL (ref 0.6–1.3)
EOSINOPHIL # BLD AUTO: 0.07 10*3/MM3 (ref 0–0.7)
EOSINOPHIL NFR BLD AUTO: 0.5 % (ref 0–7)
ERYTHROCYTE [DISTWIDTH] IN BLOOD BY AUTOMATED COUNT: 13.1 % (ref 11.5–14.5)
GLOBULIN SER CALC-MCNC: 2.9 GM/DL
GLUCOSE SERPL-MCNC: 91 MG/DL (ref 74–98)
HBA1C MFR BLD: 5.3 %
HCT VFR BLD AUTO: 44.9 % (ref 37–47)
HDLC SERPL-MCNC: 46 MG/DL (ref 40–60)
HGB BLD-MCNC: 15 G/DL (ref 12–16)
IMM GRANULOCYTES # BLD: 0.05 10*3/MM3 (ref 0–0.06)
IMM GRANULOCYTES NFR BLD: 0.4 % (ref 0–0.6)
LDLC SERPL CALC-MCNC: 91 MG/DL (ref 0–99)
LYMPHOCYTES # BLD AUTO: 3.75 10*3/MM3 (ref 0.6–3.4)
LYMPHOCYTES NFR BLD AUTO: 29 % (ref 10–50)
MCH RBC QN AUTO: 32.3 PG (ref 27–31)
MCHC RBC AUTO-ENTMCNC: 33.4 G/DL (ref 30–37)
MCV RBC AUTO: 96.8 FL (ref 81–99)
MONOCYTES # BLD AUTO: 0.87 10*3/MM3 (ref 0–0.9)
MONOCYTES NFR BLD AUTO: 6.7 % (ref 0–12)
NEUTROPHILS # BLD AUTO: 8.13 10*3/MM3 (ref 2–6.9)
NEUTROPHILS NFR BLD AUTO: 63.1 % (ref 37–80)
NRBC BLD AUTO-RTO: 0 /100 WBC (ref 0–0)
PLATELET # BLD AUTO: 275 10*3/MM3 (ref 130–400)
POTASSIUM SERPL-SCNC: 4.2 MMOL/L (ref 3.5–5.1)
PROT SERPL-MCNC: 7.6 G/DL (ref 6.3–8.2)
RBC # BLD AUTO: 4.64 10*6/MM3 (ref 4.2–5.4)
SODIUM SERPL-SCNC: 138 MMOL/L (ref 137–145)
T4 FREE SERPL-MCNC: 0.82 NG/DL (ref 0.78–2.19)
TRIGL SERPL-MCNC: 91 MG/DL
TSH SERPL DL<=0.005 MIU/L-ACNC: 1.09 MIU/ML (ref 0.47–4.68)
VLDLC SERPL CALC-MCNC: 18.2 MG/DL
WBC # BLD AUTO: 12.91 10*3/MM3 (ref 4.8–10.8)

## 2018-12-20 PROCEDURE — 99212 OFFICE O/P EST SF 10 MIN: CPT | Performed by: PHYSICIAN ASSISTANT

## 2018-12-20 PROCEDURE — 90715 TDAP VACCINE 7 YRS/> IM: CPT | Performed by: PHYSICIAN ASSISTANT

## 2018-12-20 PROCEDURE — 90471 IMMUNIZATION ADMIN: CPT | Performed by: PHYSICIAN ASSISTANT

## 2018-12-20 PROCEDURE — 90632 HEPA VACCINE ADULT IM: CPT | Performed by: PHYSICIAN ASSISTANT

## 2018-12-20 PROCEDURE — 99396 PREV VISIT EST AGE 40-64: CPT | Performed by: PHYSICIAN ASSISTANT

## 2018-12-20 PROCEDURE — 90472 IMMUNIZATION ADMIN EACH ADD: CPT | Performed by: PHYSICIAN ASSISTANT

## 2018-12-20 RX ORDER — BUPROPION HYDROCHLORIDE 150 MG/1
150 TABLET ORAL DAILY
Qty: 30 TABLET | Refills: 3 | Status: SHIPPED | OUTPATIENT
Start: 2018-12-20 | End: 2019-04-05 | Stop reason: DRUGHIGH

## 2018-12-20 NOTE — PROGRESS NOTES
Chief Complaint   Patient presents with   • Annual Exam     Pt is here for her yearly visit, is wanting a tetnas booster and hep a vaccine.    • Anxiety     Wanting to try zoloft for her depression       Heidi Franklin is a 38 y.o. female and is here for a comprehensive physical exam. The patient reports problems - Depressed mood.    Depression:  Patient reports that she has chronic history neck pain.  She has had two spinal fusions C4-C7.  She now has bulging discs in the low back.  This has contributed to her mood.  She also lost her mother this year.  Since this she reports that she has frequent crying spells, anhedonia, has a hard time doing daily activities.  She denies hopelessness.  Denies SI/HI.  She has taken several anti-depressant before in the past.  She has tried Celexa, Effexor, and additional medications for which she cannot remember. None of these have helped her symptoms.     History:  LMP: No LMP recorded. Patient has had an implant.  Last pap date: 2018  Abnormal pap? yes  : 1  Para: 1    Do you take any herbs or supplements that were not prescribed by a doctor? yes  Are you taking calcium supplements? yes  Are you taking aspirin daily? no      Health Habits:  Dental Exam. up to date  Eye Exam. not up to date - a few years  Exercise: 0 times/week.  Current exercise activities include: none    Health Maintenance   Topic Date Due   • ANNUAL PHYSICAL  1981   • HEPATITIS A VACCINE ADULT (1 of 2) 1996   • PNEUMOCOCCAL VACCINE (19-64 MEDIUM RISK) (1 of 1 - PPSV23) 1997   • TDAP/TD VACCINES (1 - Tdap) 1997   • PAP SMEAR  2021   • INFLUENZA VACCINE  Completed       PMH, PSH, SocHx, FamHx, Allergies, and Medications: Reviewed and updated in the Visit Navigator.     Allergies   Allergen Reactions   • Citalopram Other (See Comments)     Past Medical History:   Diagnosis Date   • Anxiety    • Arthritis    • Carpal tunnel syndrome     right   • Cervical disc herniation  2016   • Encounter for insertion of mirena IUD 2016   • Encounter for insertion of mirena IUD 2016   • Headache    • History of Papanicolaou smear of cervix 10/20/2015    WNL   • History of Papanicolaou smear of cervix 2016    WNL   • Low back pain    • Scoliosis      Past Surgical History:   Procedure Laterality Date   • BREAST BIOPSY     • BREAST LUMPECTOMY      right   • BREAST SURGERY Right    •  SECTION     • INTERVENTIONAL RADIOLOGY PROCEDURE N/A 2017    Procedure:  myelogram cervical spine;  Surgeon: Moody Fitch MD;  Location: [facility] INVASIVE LOCATION;  Service:    • OTHER SURGICAL HISTORY      Breast Mammatome, Needle Biopsy, ACDF    • SPINE SURGERY  2017, 2018    ACDF C5-6 and C6-7, ACDF C4-5     Social History     Socioeconomic History   • Marital status:      Spouse name: Not on file   • Number of children: Not on file   • Years of education: Not on file   • Highest education level: Not on file   Social Needs   • Financial resource strain: Not on file   • Food insecurity - worry: Not on file   • Food insecurity - inability: Not on file   • Transportation needs - medical: Not on file   • Transportation needs - non-medical: Not on file   Occupational History   • Not on file   Tobacco Use   • Smoking status: Current Every Day Smoker     Packs/day: 1.50     Years: 15.00     Pack years: 22.50   • Smokeless tobacco: Never Used   Substance and Sexual Activity   • Alcohol use: No   • Drug use: No   • Sexual activity: Yes     Partners: Male     Birth control/protection: IUD     Comment: Mirena   Other Topics Concern   • Not on file   Social History Narrative   • Not on file     Family History   Problem Relation Age of Onset   • COPD Other    • Arthritis Maternal Aunt         Spine   • Breast cancer Maternal Aunt    • Arthritis Maternal Aunt         Spine   • Drug abuse Maternal Aunt             • Breast cancer Maternal Aunt    •  "Arthritis Maternal Grandmother         Spine   • Cancer Mother         Throat Cancer   • COPD Mother        Review of Systems  Review of Systems   Constitutional: Positive for fatigue. Negative for activity change, appetite change, chills and fever.   HENT: Negative.    Eyes: Negative.    Respiratory: Negative.  Negative for chest tightness and shortness of breath.    Cardiovascular: Positive for leg swelling (ocassionaly ). Negative for chest pain.   Gastrointestinal: Negative.  Negative for constipation, diarrhea, nausea and vomiting.   Endocrine: Negative.  Negative for cold intolerance and heat intolerance.   Genitourinary: Negative for dysuria and pelvic pain.   Musculoskeletal: Positive for arthralgias, back pain and neck pain. Negative for joint swelling and myalgias.   Neurological: Negative for dizziness and light-headedness.   Hematological: Negative for adenopathy. Does not bruise/bleed easily.   Psychiatric/Behavioral: Positive for dysphoric mood, depressed mood and stress. Negative for sleep disturbance. The patient is nervous/anxious.         Vitals:    12/20/18 1006   BP: 118/80   Pulse: 98   Resp: 16   Temp: 98.4 °F (36.9 °C)   SpO2: 98%       Objective   /80   Pulse 98   Temp 98.4 °F (36.9 °C) (Temporal)   Resp 16   Ht 167.6 cm (66\")   Wt 74.9 kg (165 lb 1.9 oz)   SpO2 98%   BMI 26.65 kg/m²     Physical Exam   Constitutional: She is oriented to person, place, and time. She appears well-developed and well-nourished. No distress.   Pleasant, blunt affect   HENT:   Head: Normocephalic and atraumatic.   Eyes: Conjunctivae and EOM are normal. Pupils are equal, round, and reactive to light.   Neck: Normal range of motion. Neck supple. No thyromegaly present.   Scar noted to the left lateral neck.    Cardiovascular: Normal rate, regular rhythm, normal heart sounds and intact distal pulses. Exam reveals no gallop and no friction rub.   No murmur heard.  Pulmonary/Chest: Effort normal and breath " sounds normal. No respiratory distress. She has no wheezes. She has no rales.   Abdominal: Soft. Normal appearance and bowel sounds are normal. She exhibits no distension. There is no tenderness. There is no rigidity, no rebound, no guarding and no CVA tenderness.   Musculoskeletal: Normal range of motion. She exhibits no edema.   Lymphadenopathy:     She has no cervical adenopathy.   Neurological: She is alert and oriented to person, place, and time.   Skin: Skin is warm and dry. Capillary refill takes less than 2 seconds. She is not diaphoretic.   Psychiatric: Her speech is normal and behavior is normal. Judgment normal. Her affect is blunt. Cognition and memory are normal.   Nursing note and vitals reviewed.        Assessment/Plan   1. Healthy female exam.    2. Patient Counseling: Including but not Limited to the following, when appropriate:  --Nutrition: Stressed importance of moderation in sodium/caffeine intake, saturated fat and cholesterol, caloric balance, sufficient intake of fresh fruits, vegetables, fiber, calcium, iron, and 1 mg of folate supplement per day (for females capable of pregnancy).  --Exercise: Stressed the importance of regular exercise.  Although patient has history of spinal problems, moderate exercise is beneficial.  --Substance Abuse: Discussed cessation/primary prevention of tobacco, alcohol, or other drug use; driving or other dangerous activities under the influence; availability of treatment for abuse, as indicated based on social history.  Patient reports that she is going to try to stop smoking at the begininng of the year. She is not interested in Chantix at this time, has previously used patches and gum with little success.  Have discussed healthy coping mechanisms to decrease anxiety with smoking cessation.    --Sexuality: Discussed sexually transmitted diseases, partner selection, use of condoms, avoidance of unintended pregnancy  and contraceptive alternatives.   --Injury  prevention: Discussed safety belts, safety helmets, smoke detector, smoking near bedding or upholstery.   --Dental health: Discussed importance of regular tooth brushing, flossing, and dental visits.  --Immunizations reviewed.  Received T-Dap and Hep A today.     3. Discussed the patient's BMI with her.  Patient's Body mass index is 26.65 kg/m². BMI is within normal parameters. No follow-up required.     4. Return in about 2 months (around 2/20/2019) for Recheck.  5. Age-appropriate Screening Scheduled    Heidi was seen today for annual exam and anxiety.    Diagnoses and all orders for this visit:    Annual physical exam  -     Will obtain labs.  Follow up with patient when resulted.  Start Wellbutrin for depression.  This could potentially help with smoking cessation as well.  If this is not improving, consider Gene[employer] panel.  Patient should return to clinic sooner if depression worsens.  Discontinue medication if this occurs.   -     Comprehensive Metabolic Panel  -     CBC & Differential  -     Lipid Panel  -     Hemoglobin A1c    Family history of type 2 diabetes mellitus  -     Hemoglobin A1c    Fatigue, unspecified type  -     TSH+Free T4  -     Hemoglobin A1c    Depression, unspecified depression type  -     buPROPion XL (WELLBUTRIN XL) 150 MG 24 hr tablet; Take 1 tablet by mouth Daily.    Need for hepatitis A vaccination  -     Hepatitis A Vaccine Adult IM    Need for Tdap vaccination  -     Tdap Vaccine Greater Than or Equal To 8yo IM         Deana Lentz PA-C

## 2019-04-02 ENCOUNTER — OFFICE VISIT (OUTPATIENT)
Dept: OBSTETRICS AND GYNECOLOGY | Facility: CLINIC | Age: 39
End: 2019-04-02

## 2019-04-02 VITALS
WEIGHT: 176 LBS | SYSTOLIC BLOOD PRESSURE: 122 MMHG | HEIGHT: 66 IN | DIASTOLIC BLOOD PRESSURE: 70 MMHG | BODY MASS INDEX: 28.28 KG/M2

## 2019-04-02 DIAGNOSIS — Z01.419 ENCOUNTER FOR GYNECOLOGICAL EXAMINATION WITHOUT ABNORMAL FINDING: Primary | ICD-10-CM

## 2019-04-02 PROCEDURE — 99396 PREV VISIT EST AGE 40-64: CPT | Performed by: OBSTETRICS & GYNECOLOGY

## 2019-04-02 NOTE — PROGRESS NOTES
Subjective   Chief Complaint   Patient presents with   • Gynecologic Exam     Last pap 2018 , Mirena inserted       Heidi Franklin is a 38 y.o. year old  presenting to be seen for her annual exam.     SEXUAL Hx:  She is currently sexually active.  In the past year there has not been new sexual partners.    Condoms are not typically used.  She would not like to be screened for STD's at today's exam.  Current birth control method: not using any form of contraception and does not wish to get pregnant.  MENSTRUAL Hx:  No LMP recorded. Patient has had an implant.  In the past 6 months her cycles have been absent.   Her menstrual flow has been absent.   Each month on average there are roughly 0 days of very heavy flow.    Intermenstrual bleeding is absent.    Post-coital bleeding is absent.  Dysmenorrhea: is not affecting her activities of daily living  PMS: is not affecting her activities of daily living  Her cycles are not a source of concern for her that she wishes to discuss today.  HEALTH Hx:  She exercises regularly: no (and has no plans to become more active).  She wears her seat belt:yes.  She has concerns about domestic violence: no.  OTHER COMPLAINTS:  Nothing else    The following portions of the patient's history were reviewed and updated as appropriate:  She  has a past medical history of Anxiety, Arthritis, Carpal tunnel syndrome, Cervical disc herniation (2016), Encounter for insertion of mirena IUD (2016), Encounter for insertion of mirena IUD (2016), Headache, History of Papanicolaou smear of cervix (10/20/2015), History of Papanicolaou smear of cervix (2016), Low back pain, and Scoliosis.  She does not have any pertinent problems on file.  She  has a past surgical history that includes Breast surgery (Right);  section; Interventional radiology procedure (N/A, 2017); Other surgical history; Spine surgery (2017, 2018); Breast biopsy; and  Breast lumpectomy.  Her family history includes Arthritis in her maternal aunt, maternal aunt, and maternal grandmother; Breast cancer in her maternal aunt and maternal aunt; COPD in her mother and other; Cancer in her mother; Drug abuse in her maternal aunt.  She  reports that she has been smoking.  She has a 22.50 pack-year smoking history. She has never used smokeless tobacco. She reports that she does not drink alcohol or use drugs.  Current Outpatient Medications   Medication Sig Dispense Refill   • acetaminophen (TYLENOL) 500 MG tablet Take 500 mg by mouth Every 6 (Six) Hours As Needed for mild pain (1-3).     • buPROPion XL (WELLBUTRIN XL) 150 MG 24 hr tablet Take 1 tablet by mouth Daily. 30 tablet 3   • cyclobenzaprine (FLEXERIL) 5 MG tablet Take 1 tablet by mouth 3 (Three) Times a Day As Needed for Muscle Spasms. 90 tablet 5   • diazePAM (VALIUM) 5 MG tablet Take 5 mg by mouth 3 (Three) Times a Day As Needed for Muscle Spasms.  0   • gabapentin (NEURONTIN) 100 MG capsule Take 1 capsule by mouth 3 (Three) Times a Day. 90 capsule 1   • Levonorgestrel (MIRENA, 52 MG, IU) by Intrauterine route.     • SUMAtriptan (IMITREX) 100 MG tablet Take 1 tablet by mouth 1 (One) Time As Needed for migraine for up to 1 dose. 9 tablet 5     No current facility-administered medications for this visit.      Current Outpatient Medications on File Prior to Visit   Medication Sig   • acetaminophen (TYLENOL) 500 MG tablet Take 500 mg by mouth Every 6 (Six) Hours As Needed for mild pain (1-3).   • buPROPion XL (WELLBUTRIN XL) 150 MG 24 hr tablet Take 1 tablet by mouth Daily.   • cyclobenzaprine (FLEXERIL) 5 MG tablet Take 1 tablet by mouth 3 (Three) Times a Day As Needed for Muscle Spasms.   • diazePAM (VALIUM) 5 MG tablet Take 5 mg by mouth 3 (Three) Times a Day As Needed for Muscle Spasms.   • gabapentin (NEURONTIN) 100 MG capsule Take 1 capsule by mouth 3 (Three) Times a Day.   • Levonorgestrel (MIRENA, 52 MG, IU) by Intrauterine  "route.   • SUMAtriptan (IMITREX) 100 MG tablet Take 1 tablet by mouth 1 (One) Time As Needed for migraine for up to 1 dose.     No current facility-administered medications on file prior to visit.      She is allergic to citalopram..    Social History    Tobacco Use      Smoking status: Current Every Day Smoker        Packs/day: 1.50        Years: 15.00        Pack years: 22.5      Smokeless tobacco: Never Used    Review of Systems  Consitutional NEG: anorexia or night sweats    POS: nothing reported   Gastointestinal NEG: bloating, change in bowel habits, melena or reflux symptoms    POS: nothing reported   Genitourinary NEG: dysuria or hematuria    POS: nothing reported   Integument NEG: moles that are changing in size, shape, color or rashes    POS: nothing reported   Breast NEG: persistent breast lump, skin dimpling or nipple discharge    POS: nothing reported          Objective   /70   Ht 167.6 cm (66\")   Wt 79.8 kg (176 lb)   BMI 28.41 kg/m²     General:  well developed; well nourished  no acute distress   Skin:  No suspicious lesions seen   Thyroid: normal to inspection and palpation   Breasts:  Examined in supine position  Symmetric without masses or skin dimpling  Nipples normal without inversion, lesions or discharge  There are no palpable axillary nodes   Abdomen: soft, non-tender; no masses  no umbilical or inguinal hernias are present  no hepato-splenomegaly   Pelvis: Clinical staff was present for exam  External genitalia:  normal appearance of the external genitalia including Bartholin's and [place]'s glands.  :  urethral meatus normal;  Vaginal:  normal pink mucosa without prolapse or lesions.  Cervix:  normal appearance. IUD string present - 1.5 cms in length;  Uterus:  normal size, shape and consistency.  Adnexa:  normal bimanual exam of the adnexa.  Rectal:  digital rectal exam not performed; anus visually normal appearing.        Assessment   1. Normal PE     Plan   1. PAP " done  2.   3. Follow up     No orders of the defined types were placed in this encounter.         This note was electronically signed.      April 2, 2019

## 2019-04-05 ENCOUNTER — OFFICE VISIT (OUTPATIENT)
Dept: INTERNAL MEDICINE | Facility: CLINIC | Age: 39
End: 2019-04-05

## 2019-04-05 VITALS
WEIGHT: 175 LBS | HEIGHT: 66 IN | RESPIRATION RATE: 16 BRPM | BODY MASS INDEX: 28.12 KG/M2 | SYSTOLIC BLOOD PRESSURE: 124 MMHG | DIASTOLIC BLOOD PRESSURE: 88 MMHG | TEMPERATURE: 97.4 F | HEART RATE: 102 BPM | OXYGEN SATURATION: 98 %

## 2019-04-05 DIAGNOSIS — M48.02 SPINAL STENOSIS IN CERVICAL REGION: ICD-10-CM

## 2019-04-05 DIAGNOSIS — M50.20 CERVICAL DISC HERNIATION: ICD-10-CM

## 2019-04-05 DIAGNOSIS — M54.12 CERVICAL RADICULOPATHY: ICD-10-CM

## 2019-04-05 DIAGNOSIS — G43.909 MIGRAINE WITHOUT STATUS MIGRAINOSUS, NOT INTRACTABLE, UNSPECIFIED MIGRAINE TYPE: ICD-10-CM

## 2019-04-05 DIAGNOSIS — G89.29 CHRONIC NECK PAIN: ICD-10-CM

## 2019-04-05 DIAGNOSIS — M54.2 CHRONIC NECK PAIN: ICD-10-CM

## 2019-04-05 DIAGNOSIS — F32.1 CURRENT MODERATE EPISODE OF MAJOR DEPRESSIVE DISORDER, UNSPECIFIED WHETHER RECURRENT (HCC): Primary | ICD-10-CM

## 2019-04-05 PROCEDURE — 99214 OFFICE O/P EST MOD 30 MIN: CPT | Performed by: FAMILY MEDICINE

## 2019-04-05 RX ORDER — BUPROPION HYDROCHLORIDE 300 MG/1
300 TABLET ORAL EVERY MORNING
Qty: 90 TABLET | Refills: 3 | Status: SHIPPED | OUTPATIENT
Start: 2019-04-05 | End: 2019-10-07

## 2019-04-05 RX ORDER — SUMATRIPTAN 100 MG/1
100 TABLET, FILM COATED ORAL ONCE AS NEEDED
Qty: 27 TABLET | Refills: 3 | Status: SHIPPED | OUTPATIENT
Start: 2019-04-05 | End: 2020-07-02 | Stop reason: SDUPTHER

## 2019-04-05 RX ORDER — GABAPENTIN 100 MG/1
100 CAPSULE ORAL 3 TIMES DAILY
Qty: 270 CAPSULE | Refills: 1 | Status: SHIPPED | OUTPATIENT
Start: 2019-04-05 | End: 2019-10-07 | Stop reason: SDUPTHER

## 2019-04-05 NOTE — PROGRESS NOTES
"Subjective    Heidi Franklin is a 38 y.o. female here for:    Chief Complaint   Patient presents with   • Depression     follow up        History of Present Illness   Mood somewhat improved with Wellbutrin 150 mg daily, she feels her still room for improvement.  Migraines stable, she needs a refill on Imitrex.  She is about to resume physical therapy for her chronic neck issues, she needs a refill on gabapentin but has plenty of Flexeril remaining.  She had tried to go without gabapentin but has found she feels better if she does take it.    The following portions of the patient's history were reviewed and updated as appropriate: allergies, current medications, past family history, past medical history, past social history, past surgical history and problem list.    Health Maintenance   Topic Date Due   • PNEUMOCOCCAL VACCINE (19-64 MEDIUM RISK) (1 of 1 - PPSV23) 08/05/1997   • INFLUENZA VACCINE  08/01/2019   • ANNUAL PHYSICAL  12/21/2019   • MAMMOGRAM  09/18/2020   • PAP SMEAR  01/23/2021   • TDAP/TD VACCINES (2 - Td) 12/20/2028       Review of Systems   Musculoskeletal: Positive for arthralgias, myalgias and neck pain.   Psychiatric/Behavioral: Positive for stress.       Vitals:    04/05/19 1537   BP: 124/88   Pulse: 102   Resp: 16   Temp: 97.4 °F (36.3 °C)   TempSrc: Temporal   SpO2: 98%   Weight: 79.4 kg (175 lb)   Height: 167.6 cm (66\")         Objective   Physical Exam   Constitutional: She is oriented to person, place, and time. Vital signs are normal. She appears well-developed and well-nourished. She is active.  Non-toxic appearance. She does not appear ill. No distress.   HENT:   Head: Normocephalic and atraumatic.   Right Ear: Hearing normal.   Left Ear: Hearing normal.   Mouth/Throat: Mucous membranes are not dry.   Eyes: EOM are normal. No scleral icterus.   Neck: Phonation normal. Neck supple.   Pulmonary/Chest: Effort normal.   Neurological: She is alert and oriented to person, place, and time. She " displays no tremor. No cranial nerve deficit.   Skin: Skin is warm. No rash noted. She is not diaphoretic. No pallor.   Psychiatric: She has a normal mood and affect. Her speech is normal and behavior is normal. Judgment and thought content normal. Cognition and memory are normal.   Nursing note and vitals reviewed.        Assessment/Plan     Problem List Items Addressed This Visit        Cardiovascular and Mediastinum    Headache, migraine    Relevant Medications    SUMAtriptan (IMITREX) 100 MG tablet    buPROPion XL (WELLBUTRIN XL) 300 MG 24 hr tablet    gabapentin (NEURONTIN) 100 MG capsule       Nervous and Auditory    Chronic neck pain    Relevant Medications    gabapentin (NEURONTIN) 100 MG capsule    diclofenac (VOLTAREN) 1 % gel gel    Cervical radiculopathy    Relevant Medications    gabapentin (NEURONTIN) 100 MG capsule       Musculoskeletal and Integument    Cervical disc herniation    Relevant Medications    gabapentin (NEURONTIN) 100 MG capsule       Other    Spinal stenosis in cervical region    Relevant Medications    gabapentin (NEURONTIN) 100 MG capsule      Other Visit Diagnoses     Current moderate episode of major depressive disorder, unspecified whether recurrent (CMS/HCC)    -  Primary    Relevant Medications    buPROPion XL (WELLBUTRIN XL) 300 MG 24 hr tablet          · Discussed turmeric for uncontrolled joint pain  · Increased Wellbutrin as depression not controlled    Return in about 6 months (around 10/5/2019) for Annual physical. or sooner if needed.    Martine Evans MD

## 2019-04-10 DIAGNOSIS — Z01.419 ENCOUNTER FOR GYNECOLOGICAL EXAMINATION WITHOUT ABNORMAL FINDING: ICD-10-CM

## 2019-05-04 DIAGNOSIS — F32.A DEPRESSION, UNSPECIFIED DEPRESSION TYPE: ICD-10-CM

## 2019-05-06 RX ORDER — BUPROPION HYDROCHLORIDE 150 MG/1
TABLET ORAL
Qty: 30 TABLET | Refills: 3 | OUTPATIENT
Start: 2019-05-06

## 2019-06-04 ENCOUNTER — TREATMENT (OUTPATIENT)
Dept: PHYSICAL THERAPY | Facility: CLINIC | Age: 39
End: 2019-06-04

## 2019-06-04 DIAGNOSIS — M54.50 CHRONIC BILATERAL LOW BACK PAIN WITHOUT SCIATICA: ICD-10-CM

## 2019-06-04 DIAGNOSIS — G89.29 CHRONIC BILATERAL LOW BACK PAIN WITHOUT SCIATICA: ICD-10-CM

## 2019-06-04 DIAGNOSIS — M54.2 PAIN, NECK: Primary | ICD-10-CM

## 2019-06-04 PROCEDURE — 97530 THERAPEUTIC ACTIVITIES: CPT | Performed by: PHYSICAL THERAPIST

## 2019-06-04 PROCEDURE — 97162 PT EVAL MOD COMPLEX 30 MIN: CPT | Performed by: PHYSICAL THERAPIST

## 2019-06-04 NOTE — PROGRESS NOTES
Physical Therapy Initial Evaluation and Plan of Care      Patient: Heidi Franklin   : 1980  Diagnosis/ICD-10 Code:  No primary diagnosis found.  Referring practitioner: No ref. provider found    Subjective Evaluation    History of Present Illness  Mechanism of injury: C/S cristhian 2018, 2017.  PT here after those procedures.      Pt reports that the neck and the shoulders are about the same as the lumbar spine and hips at this time.     R UE sxs are still active.  B LE's don't feel right.  Occasional numbness and pain and throbbing all the time.     Patient reports she feels like everything is getting worse all the time.  She reports her  is still washing her hair due to weakness in the arms.      She is driving again but turning the head especially to the R is difficult.     She recently graduated from college this past May.      Pt reports no current HEP or no other exercise beyond basic housecleaning.     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    Precautions and Work Restrictions: none Pain  Current pain ratin  At best pain ratin  Location: multiple.   Alleviating factors: none   Aggravating factors: movement, lifting, keyboarding, overhead activity, prolonged positioning, outstretched reach and repetitive movement  Progression: worsening    Hand dominance: right    Treatments  Previous treatment: physical therapy  Patient Goals  Patient goal: walking and hand writing.            Objective       Palpation     Additional Palpation Details  Pt with only very minimal MM guarding or tightness noted.     Neurological Testing     Sensation   Cervical/Thoracic     Right   Diminished: light touch    Comments   Right light touch: 1st -3rd finger tips. .     Lumbar   Left   Intact: light touch    Right   Intact: light touch    Reflexes   Left   Biceps (C5/C6): trace (1+)  Brachioradialis (C6): trace (1+)  Triceps (C7): trace (1+)  Patellar  (L4): normal (2+)  Achilles (S1): trace (1+)    Right   Biceps (C5/C6): trace (1+)  Brachioradialis (C6): trace (1+)  Triceps (C7): trace (1+)  Patellar (L4): normal (2+)  Achilles (S1): trace (1+)    Active Range of Motion   Cervical/Thoracic Spine   Cervical    Left lateral flexion: 15 degrees   Right lateral flexion: 20 degrees with pain  Left rotation: 50 degrees   Right rotation: 41 degrees   Left Shoulder   Flexion: 180 degrees     Right Shoulder   Flexion: 180 degrees     Lumbar   Flexion: Active lumbar flexion: Finger tips to ankle.  Lumbar reversal noted. WFL  Extension: Active lumbar extension: no pain noted with lumbar extension.     Passive Range of Motion     Additional Passive Range of Motion Details  T/S hypomobility minimal, Testing mobilized several segments without pain.     Strength/Myotome Testing     Left Wrist/Hand      (2nd hand position)     Trial 1: 38 lbs    Trial 2: 29 lbs    Trial 3: 32 lbs    Average: 33 lbs    Right Wrist/Hand      (2nd hand position)     Trial 1: 33 lbs    Trial 2: 34 lbs    Trial 3: 32 lbs    Average: 33 lbs    Tests     Left Shoulder   Negative ULTT1, ULTT2, ULTT3 and ULTT4.     Right Shoulder   Negative Dyllan (relief of numbness noted with testing. ), ULTT1, ULTT2, ULTT3 and ULTT4.     Lumbar     Left   Negative crossed SLR and passive SLR.     Right   Negative crossed SLR and passive SLR.     Additional Tests Details  Tests for TOCS seemed to still have good radial pulse.  Compression of the pec MM seemed to have no changes to the UE sxs.      Full ROM of the ULTT on B UE's.          Assessment & Plan     Assessment  Impairments: abnormal muscle tone, abnormal or restricted ROM, activity intolerance, lacks appropriate home exercise program and pain with function  Assessment details: Patient is a 38 year old female who comes to physical therapy with chronic pain in the neck/UE and Back/LE. Signs and symptoms are consistent with chronic pain and  hypersensitivity.  She actually moves well and her nerve length tests were good and the MM tonicity seemed fairly normal.  The patient currently has pain, decreased ROM, decreased strength, and inability to perform all essential functional activities. Pt will benefit from skilled PT services to address the above issues.     Prognosis: poor  Functional Limitations: carrying objects, lifting, pulling, pushing, uncomfortable because of pain, sitting, standing, stooping and unable to perform repetitive tasks  Goals  Plan Goals: SHORT TERM GOALS:     2 weeks  1. Pt independent with HEP  2. Pt to demonstrate trunk AROM % of expected norms to allow for improved ability to perform ADL's  3. Pt to demonstrate bilateral hip strength 4/5 in all planes to improved stability of the core/trunk     LONG TERM GOALS:   6 weeks  1. Pt to demonstrate trunk AROM % of expected norms to allow for improved ability to perform functional activities  2. Pt to demonstrate ability to perform full functional squat with good form and without increased pain in the low back   3. Pt to report being able to work full shift or work in the home without increase in pain in the back  4. Pt to report cessation of pain/numbness/tingling into the bilateral leg to show decreased nerve compression      Plan  Therapy options: will be seen for skilled physical therapy services  Planned modality interventions: cryotherapy, thermotherapy (hydrocollator packs) and electrical stimulation/Russian stimulation  Planned therapy interventions: abdominal trunk stabilization, manual therapy, spinal/joint mobilization, soft tissue mobilization, strengthening, stretching, therapeutic activities, functional ROM exercises, flexibility, body mechanics training, neuromuscular re-education and postural training  Treatment plan discussed with: patient  Plan details: Pt will be seen 1 x / week.  Assess Pt response to PREP, posture and body mechanics.         Manual  Therapy:         mins  00534;  Therapeutic Exercise:         mins  86291;     Neuromuscular Ro:        mins  95446;    Therapeutic Activity:     10     mins  87610;     Gait Training:           mins  76933;     Ultrasound:          mins  02776;    Electrical Stimulation:         mins  66347 ( );  Dry Needling          mins self-pay    Timed Treatment:   10   mins   Total Treatment:     58   mins    PT SIGNATURE: Lasha Brunner, PT   DATE TREATMENT INITIATED: 6/4/2019    Initial Certification  Certification Period: 9/2/2019  I certify that the therapy services are furnished while this patient is under my care.  The services outlined above are required by this patient, and will be reviewed every 90 days.     PHYSICIAN:       DATE:     Please sign and return via fax to  .. Thank you, [facility].

## 2019-06-05 ENCOUNTER — TRANSCRIBE ORDERS (OUTPATIENT)
Dept: PHYSICAL THERAPY | Facility: CLINIC | Age: 39
End: 2019-06-05

## 2019-06-05 DIAGNOSIS — M54.5 LOW BACK PAIN, UNSPECIFIED BACK PAIN LATERALITY, UNSPECIFIED CHRONICITY, WITH SCIATICA PRESENCE UNSPECIFIED: ICD-10-CM

## 2019-06-05 DIAGNOSIS — M79.18 MYOFASCIAL PAIN: ICD-10-CM

## 2019-06-05 DIAGNOSIS — M54.2 NECK PAIN: Primary | ICD-10-CM

## 2019-06-11 ENCOUNTER — TREATMENT (OUTPATIENT)
Dept: PHYSICAL THERAPY | Facility: CLINIC | Age: 39
End: 2019-06-11

## 2019-06-11 DIAGNOSIS — M54.50 CHRONIC BILATERAL LOW BACK PAIN WITHOUT SCIATICA: ICD-10-CM

## 2019-06-11 DIAGNOSIS — M54.2 PAIN, NECK: Primary | ICD-10-CM

## 2019-06-11 DIAGNOSIS — G89.29 CHRONIC BILATERAL LOW BACK PAIN WITHOUT SCIATICA: ICD-10-CM

## 2019-06-11 PROCEDURE — 97140 MANUAL THERAPY 1/> REGIONS: CPT | Performed by: PHYSICAL THERAPIST

## 2019-06-11 PROCEDURE — 97110 THERAPEUTIC EXERCISES: CPT | Performed by: PHYSICAL THERAPIST

## 2019-06-11 PROCEDURE — 97530 THERAPEUTIC ACTIVITIES: CPT | Performed by: PHYSICAL THERAPIST

## 2019-06-25 ENCOUNTER — TREATMENT (OUTPATIENT)
Dept: PHYSICAL THERAPY | Facility: CLINIC | Age: 39
End: 2019-06-25

## 2019-06-25 DIAGNOSIS — G89.29 CHRONIC BILATERAL LOW BACK PAIN WITHOUT SCIATICA: ICD-10-CM

## 2019-06-25 DIAGNOSIS — M54.2 PAIN, NECK: Primary | ICD-10-CM

## 2019-06-25 DIAGNOSIS — M54.50 CHRONIC BILATERAL LOW BACK PAIN WITHOUT SCIATICA: ICD-10-CM

## 2019-06-25 PROCEDURE — 97110 THERAPEUTIC EXERCISES: CPT | Performed by: PHYSICAL THERAPIST

## 2019-06-25 PROCEDURE — 97014 ELECTRIC STIMULATION THERAPY: CPT | Performed by: PHYSICAL THERAPIST

## 2019-06-25 PROCEDURE — 97530 THERAPEUTIC ACTIVITIES: CPT | Performed by: PHYSICAL THERAPIST

## 2019-06-25 NOTE — PROGRESS NOTES
Physical Therapy Daily Progress Note      Visit # : 3    Heidi Franklin reports 5/10 pain today at rest R hip and low back primary area.  Pt with the R hip issues. MRI for the mid and the neck July 8th.      Last visit the DN seemed to reduce the pain to 3/10 pain.     Objective Pt presents to PT today with no distress at rest.     MMT:  R hip abd 2+/5     PROM:  R hip has some hip flexor tightness and MM guarding but Full ROM is noted without any hip joint tightness or pathology noted.     Trial of DN again for the lumbar spine.     See Exercise, Manual, and Modality Logs for complete treatment.     Assessment/Plan  Pt with R hip joint cleared for any reproduction of sxs. She did have some positive results initially with DN.  We will try again this visit.       Progress per Plan of Care  Check response to the DN with TNS.            Manual Therapy:         mins  74573;  Therapeutic Exercise:    28     mins  34565;     Neuromuscular Ro:        mins  43455;    Therapeutic Activity:     12     mins  10029;     Gait Training:        ___  mins  29082;     Ultrasound:          mins  36146;    Electrical Stimulation:    10     mins  00206 ( );  Dry Needling          mins self-pay    Timed Treatment:   50   mins   Total Treatment:     50   mins    Lasha Brunner, PT  Physical Therapist

## 2019-07-01 ENCOUNTER — TREATMENT (OUTPATIENT)
Dept: PHYSICAL THERAPY | Facility: CLINIC | Age: 39
End: 2019-07-01

## 2019-07-01 DIAGNOSIS — M54.2 PAIN, NECK: Primary | ICD-10-CM

## 2019-07-01 DIAGNOSIS — G89.29 CHRONIC BILATERAL LOW BACK PAIN WITHOUT SCIATICA: ICD-10-CM

## 2019-07-01 DIAGNOSIS — M54.50 CHRONIC BILATERAL LOW BACK PAIN WITHOUT SCIATICA: ICD-10-CM

## 2019-07-01 PROCEDURE — 97140 MANUAL THERAPY 1/> REGIONS: CPT | Performed by: PHYSICAL THERAPIST

## 2019-07-01 PROCEDURE — 97110 THERAPEUTIC EXERCISES: CPT | Performed by: PHYSICAL THERAPIST

## 2019-07-01 NOTE — PROGRESS NOTES
Physical Therapy Daily Progress Note      Visit # : 4    Heidi Franklin reports 4/10 pain today at rest.  Pt reports that the TNS unit to the DN freaked her out.   Pt reports that the low back felt good after leaving here but the hip pain did not seem to get relief.       Primary area of pain today is in the R hip and the lumbar spine.     R arm is numb today.         Objective Pt presents to PT today with no distress noted at rest.     Pt with hip ext stretching providing some relief initially with the R hip pain.       Trunk segmental ROM is fairly symmetric with motion.       See Exercise, Manual, and Modality Logs for complete treatment.     Assessment/Plan  Pt with only minimal response to DN treatment.  Pt focused more on core stability today without elevated pain.        Progress strengthening /stabilization /functional activity  Check response to the treatment today.            Manual Therapy:    11     mins  64863;  Therapeutic Exercise:    42     mins  59788;     Neuromuscular Ro:        mins  57554;    Therapeutic Activity:          mins  60570;     Gait Training:        ___  mins  58057;     Ultrasound:          mins  43688;    Electrical Stimulation:         mins  74715 ( );  Dry Needling          mins self-pay    Timed Treatment:   53   mins   Total Treatment:     53   mins    Lasha Brunner, PT  Physical Therapist

## 2019-07-05 DIAGNOSIS — M50.20 CERVICAL DISC HERNIATION: ICD-10-CM

## 2019-07-05 RX ORDER — CYCLOBENZAPRINE HCL 5 MG
TABLET ORAL
Qty: 90 TABLET | Refills: 5 | Status: SHIPPED | OUTPATIENT
Start: 2019-07-05 | End: 2021-07-01

## 2019-07-17 ENCOUNTER — TREATMENT (OUTPATIENT)
Dept: PHYSICAL THERAPY | Facility: CLINIC | Age: 39
End: 2019-07-17

## 2019-07-17 DIAGNOSIS — M54.50 CHRONIC BILATERAL LOW BACK PAIN WITHOUT SCIATICA: ICD-10-CM

## 2019-07-17 DIAGNOSIS — M54.2 PAIN, NECK: Primary | ICD-10-CM

## 2019-07-17 DIAGNOSIS — G89.29 CHRONIC BILATERAL LOW BACK PAIN WITHOUT SCIATICA: ICD-10-CM

## 2019-07-17 PROCEDURE — 97530 THERAPEUTIC ACTIVITIES: CPT | Performed by: PHYSICAL THERAPIST

## 2019-07-17 NOTE — PROGRESS NOTES
Physical Therapy Daily Progress Note  D/C note      Visit # : 5    Heidi Franklin reports 4/10 pain today at rest.  Pt reports she has been having more pain with activity but when she does not come to PT she feels better.     Pain locations R hip and low back and the Legs.  R arm is still needing to be shaked out.      Overall the patient thinks that PT is not significantly helping her at this time.        Objective Pt presents to PT today with no noted distress at rest.      strength: R UE 43#, 40#, 39#,   L  43#, 32#, 36#    DTR's:  B LE and UE and equal and symmetric +2/4.     SLR is pain free in the LE's and did not reproduce pain.     See Exercise, Manual, and Modality Logs for complete treatment.     Assessment/Plan  Pt and PT both agree that she is not benefiting from PT at this time.  Pt is primarily limited due to pain at this time.        D/C at this time due to no significant improvement.            Manual Therapy:         mins  04666;  Therapeutic Exercise:         mins  33833;     Neuromuscular Ro:        mins  70282;    Therapeutic Activity:     43     mins  22433;     Gait Training:        ___  mins  58730;     Ultrasound:          mins  97839;    Electrical Stimulation:         mins  29867 ( );  Dry Needling          mins self-pay    Timed Treatment:   43   mins   Total Treatment:     43   mins    Lasha Brunner, PT  Physical Therapist

## 2019-08-27 ENCOUNTER — TRANSCRIBE ORDERS (OUTPATIENT)
Dept: MAMMOGRAPHY | Facility: HOSPITAL | Age: 39
End: 2019-08-27

## 2019-08-27 DIAGNOSIS — Z12.39 BREAST CANCER SCREENING: Primary | ICD-10-CM

## 2019-10-07 ENCOUNTER — OFFICE VISIT (OUTPATIENT)
Dept: INTERNAL MEDICINE | Facility: CLINIC | Age: 39
End: 2019-10-07

## 2019-10-07 VITALS
SYSTOLIC BLOOD PRESSURE: 124 MMHG | OXYGEN SATURATION: 97 % | TEMPERATURE: 97.6 F | WEIGHT: 190.5 LBS | HEIGHT: 66 IN | HEART RATE: 92 BPM | DIASTOLIC BLOOD PRESSURE: 82 MMHG | RESPIRATION RATE: 16 BRPM | BODY MASS INDEX: 30.62 KG/M2

## 2019-10-07 DIAGNOSIS — G89.29 CHRONIC NECK PAIN: ICD-10-CM

## 2019-10-07 DIAGNOSIS — M54.2 CHRONIC NECK PAIN: ICD-10-CM

## 2019-10-07 DIAGNOSIS — Z23 NEED FOR INFLUENZA VACCINATION: ICD-10-CM

## 2019-10-07 DIAGNOSIS — F33.9 EPISODE OF RECURRENT MAJOR DEPRESSIVE DISORDER, UNSPECIFIED DEPRESSION EPISODE SEVERITY (HCC): ICD-10-CM

## 2019-10-07 DIAGNOSIS — M48.02 SPINAL STENOSIS IN CERVICAL REGION: Primary | ICD-10-CM

## 2019-10-07 DIAGNOSIS — Z23 NEED FOR HEPATITIS A IMMUNIZATION: ICD-10-CM

## 2019-10-07 DIAGNOSIS — M50.20 CERVICAL DISC HERNIATION: ICD-10-CM

## 2019-10-07 DIAGNOSIS — M54.12 CERVICAL RADICULOPATHY: ICD-10-CM

## 2019-10-07 DIAGNOSIS — E66.9 CLASS 1 OBESITY WITHOUT SERIOUS COMORBIDITY WITH BODY MASS INDEX (BMI) OF 30.0 TO 30.9 IN ADULT, UNSPECIFIED OBESITY TYPE: ICD-10-CM

## 2019-10-07 PROCEDURE — 99214 OFFICE O/P EST MOD 30 MIN: CPT | Performed by: FAMILY MEDICINE

## 2019-10-07 PROCEDURE — 90471 IMMUNIZATION ADMIN: CPT | Performed by: FAMILY MEDICINE

## 2019-10-07 PROCEDURE — 90472 IMMUNIZATION ADMIN EACH ADD: CPT | Performed by: FAMILY MEDICINE

## 2019-10-07 PROCEDURE — 90674 CCIIV4 VAC NO PRSV 0.5 ML IM: CPT | Performed by: FAMILY MEDICINE

## 2019-10-07 PROCEDURE — 90632 HEPA VACCINE ADULT IM: CPT | Performed by: FAMILY MEDICINE

## 2019-10-07 RX ORDER — DULOXETIN HYDROCHLORIDE 30 MG/1
30 CAPSULE, DELAYED RELEASE ORAL DAILY
Qty: 7 CAPSULE | Refills: 0 | Status: SHIPPED | OUTPATIENT
Start: 2019-10-07 | End: 2020-07-01

## 2019-10-07 RX ORDER — GABAPENTIN 300 MG/1
300 CAPSULE ORAL 3 TIMES DAILY
Qty: 90 CAPSULE | Refills: 1 | Status: SHIPPED | OUTPATIENT
Start: 2019-10-07 | End: 2020-07-02 | Stop reason: SDUPTHER

## 2019-10-07 RX ORDER — DULOXETIN HYDROCHLORIDE 60 MG/1
60 CAPSULE, DELAYED RELEASE ORAL DAILY
Qty: 90 CAPSULE | Refills: 3 | Status: SHIPPED | OUTPATIENT
Start: 2019-10-07 | End: 2020-07-01

## 2019-10-07 RX ORDER — BUPROPION HYDROCHLORIDE 150 MG/1
150 TABLET ORAL EVERY MORNING
Qty: 7 TABLET | Refills: 0 | Status: SHIPPED | OUTPATIENT
Start: 2019-10-07 | End: 2020-07-01

## 2019-10-07 NOTE — PROGRESS NOTES
"Subjective    Heidi Franklin is a 39 y.o. female here for:  Chief Complaint   Patient presents with   • Depression     pt would like to discuss a new medication    • Neck Pain     6 mo f/u        Neck pain: chronic daily issue s/p surgery on C spine by Dr. Vizcaino. Has been referred to another NS by him due to issues at this time at C5-6. She has been doing PT but it's on hold for now. Gabapentin helps some though she's only on 100 mg tid. She's been hesitant to increase due to concerns of weight gain.    Depression: chronic daily issue not controlled. On Wellbutrin  mg daily. Feels down. No SI. Trying to stay as active as possible, still working. Has not tried Cymbalta.    Obesity: bmi has increased now to this diagnosis. She tries to move as much as possible (stairs instead of elevator, etc) but her chronic pain does limit her somewhat.            The following portions of the patient's history were reviewed and updated as appropriate: allergies, current medications, past family history, past medical history, past social history, past surgical history and problem list.    Review of Systems   Constitutional: Positive for fatigue and unexpected weight gain.   Musculoskeletal: Positive for arthralgias, back pain and neck pain.   Neurological: Positive for numbness.   Psychiatric/Behavioral: Positive for stress.       Vitals:    10/07/19 1341   BP: 124/82   Pulse: 92   Resp: 16   Temp: 97.6 °F (36.4 °C)   TempSrc: Temporal   SpO2: 97%   Weight: 86.4 kg (190 lb 8 oz)   Height: 167.6 cm (65.98\")     Patient's Body mass index is 30.76 kg/m². BMI is above normal parameters. Recommendations include: exercise counseling.          Objective   Physical Exam   Constitutional: She is oriented to person, place, and time. Vital signs are normal. She appears well-developed and well-nourished. She is active.  Non-toxic appearance. She does not have a sickly appearance. She does not appear ill. No distress. She is obese " (mild).  HENT:   Head: Normocephalic and atraumatic. Hair is normal.   Right Ear: Hearing and external ear normal.   Left Ear: Hearing and external ear normal.   Nose: Nose normal.   Mouth/Throat: Mucous membranes are not dry. No trismus in the jaw.   Eyes: Conjunctivae, EOM and lids are normal. Pupils are equal, round, and reactive to light. No scleral icterus.   Neck: Phonation normal. Neck supple. No tracheal deviation present.   Anterior surgical scar   Cardiovascular: Normal rate, regular rhythm and normal heart sounds.   No murmur heard.  Pulmonary/Chest: Effort normal.   Musculoskeletal: She exhibits no edema or deformity.   Neurological: She is alert and oriented to person, place, and time. She displays no tremor. No cranial nerve deficit. She exhibits normal muscle tone. Gait normal.   Skin: Skin is warm. Turgor is normal. No rash noted. She is not diaphoretic. No cyanosis. No pallor. Nails show no clubbing.   Psychiatric: She has a normal mood and affect. Her speech is normal and behavior is normal. Judgment and thought content normal. Cognition and memory are normal.   Nursing note and vitals reviewed.        Assessment/Plan   Problem List Items Addressed This Visit        Nervous and Auditory    Chronic neck pain    Relevant Medications    gabapentin (NEURONTIN) 300 MG capsule    Cervical radiculopathy    Relevant Medications    gabapentin (NEURONTIN) 300 MG capsule       Musculoskeletal and Integument    Cervical disc herniation    Relevant Medications    gabapentin (NEURONTIN) 300 MG capsule       Other    Spinal stenosis in cervical region - Primary    Relevant Medications    gabapentin (NEURONTIN) 300 MG capsule      Other Visit Diagnoses     Episode of recurrent major depressive disorder, unspecified depression episode severity (CMS/HCC)        Relevant Medications    DULoxetine (CYMBALTA) 30 MG capsule    DULoxetine (CYMBALTA) 60 MG capsule    buPROPion XL (WELLBUTRIN XL) 150 MG 24 hr tablet     Class 1 obesity without serious comorbidity with body mass index (BMI) of 30.0 to 30.9 in adult, unspecified obesity type        Need for influenza vaccination        Relevant Orders    Flucelvax Quad=>4Years (5932-0705) (Completed)    Need for hepatitis A immunization        Relevant Orders    Hepatitis A Vaccine Adult IM (Completed)          CONTROLLED SUBSTANCE TRACKING 10/7/2019   Last Ysabel 10/7/2019   Report Number [id number]       · YSABEL reviewed and appropriate.   · Tapering off Wellbutrin as tapering up Cymbalta  · Follow up with neurosurgery as scheduled.    Return in about 6 weeks (around 11/18/2019) for Follow up on current issues.    Martine Evans MD

## 2019-10-24 ENCOUNTER — HOSPITAL ENCOUNTER (OUTPATIENT)
Dept: MAMMOGRAPHY | Facility: HOSPITAL | Age: 39
Discharge: HOME | End: 2019-10-24
Admitting: SURGERY

## 2019-10-24 DIAGNOSIS — Z12.39 BREAST CANCER SCREENING: ICD-10-CM

## 2019-10-24 PROCEDURE — 77063 BREAST TOMOSYNTHESIS BI: CPT

## 2019-10-24 PROCEDURE — 77067 SCR MAMMO BI INCL CAD: CPT

## 2019-11-25 NOTE — PROGRESS NOTES
Patient: Heidi Franklin    YOB: 1980    Date: 11/26/2019    Primary Care Provider: Martine Evans MD    Chief Complaint   Patient presents with   • Follow-up       Subjective .     History of present illness:  I saw the patient in the office  today for evaluation following her recent mammogram. She had a right axilla mass biopsy performed on 10/03/18, pathology report did show PASH, fibrocystic and gynecomastoid-like changes. She had a recent mammogram performed on 10/24/19 that did show enlarging bilateral breast masses.     The patient reports no other problems at this time.    Review of Systems   Constitutional: Negative for chills, fever and unexpected weight change.   HENT: Negative for hearing loss, trouble swallowing and voice change.    Eyes: Negative for visual disturbance.   Respiratory: Negative for apnea, cough, chest tightness, shortness of breath and wheezing.    Cardiovascular: Negative for chest pain, palpitations and leg swelling.   Gastrointestinal: Negative for abdominal distention, abdominal pain, anal bleeding, blood in stool, constipation, diarrhea, nausea, rectal pain and vomiting.   Endocrine: Negative for cold intolerance and heat intolerance.   Genitourinary: Negative for difficulty urinating, dysuria and flank pain.   Musculoskeletal: Negative for back pain and gait problem.   Skin: Negative for color change, rash and wound.   Neurological: Negative for dizziness, syncope, speech difficulty, weakness, light-headedness, numbness and headaches.   Hematological: Negative for adenopathy. Does not bruise/bleed easily.   Psychiatric/Behavioral: Negative for confusion. The patient is not nervous/anxious.        History:  Past Medical History:   Diagnosis Date   • Anxiety    • Arthritis    • Carpal tunnel syndrome     right   • Cervical disc herniation 12/6/2016   • Encounter for insertion of mirena IUD 05/13/2016   • Encounter for insertion of mirena IUD 05/13/2016   •  Headache    • History of Papanicolaou smear of cervix 10/20/2015    WNL   • History of Papanicolaou smear of cervix 2016    WNL   • Low back pain    • Scoliosis           Past Surgical History:   Procedure Laterality Date   • BREAST BIOPSY     • BREAST LUMPECTOMY      right   • BREAST SURGERY Right    •  SECTION     • INTERVENTIONAL RADIOLOGY PROCEDURE N/A 2017    Procedure:  myelogram cervical spine;  Surgeon: Moody Fitch MD;  Location: [facility] INVASIVE LOC[facility];  Service:    • OTHER SURGICAL HISTORY      Breast Mammatome, Needle Biopsy, ACDF    • SPINE SURGERY  2017, 2018    ACDF C5-6 and C6-7, ACDF C4-5       Family History   Problem Relation Age of Onset   • COPD Other    • Arthritis Maternal Aunt         Spine   • Breast cancer Maternal Aunt    • Arthritis Maternal Aunt         Spine   • Drug abuse Maternal Aunt             • Breast cancer Maternal Aunt    • Arthritis Maternal Grandmother         Spine   • Cancer Mother         Throat Cancer   • COPD Mother                 Social History     Tobacco Use   • Smoking status: Current Every Day Smoker     Packs/day: 1.50     Years: 15.00     Pack years: 22.50   • Smokeless tobacco: Never Used   Substance Use Topics   • Alcohol use: No   • Drug use: No       Allergies:  Allergies   Allergen Reactions   • Citalopram Other (See Comments)       Medications:     Current Outpatient Medications:   •  acetaminophen (TYLENOL) 500 MG tablet, Take 500 mg by mouth Every 6 (Six) Hours As Needed for mild pain (1-3)., Disp: , Rfl:   •  buPROPion XL (WELLBUTRIN XL) 150 MG 24 hr tablet, Take 1 tablet by mouth Every Morning., Disp: 7 tablet, Rfl: 0  •  cyclobenzaprine (FLEXERIL) 5 MG tablet, take 1 tablet by mouth three times a day if needed, Disp: 90 tablet, Rfl: 5  •  diazePAM (VALIUM) 5 MG tablet, Take 5 mg by mouth 3 (Three) Times a Day As Needed for Muscle Spasms., Disp: , Rfl: 0  •  DULoxetine (CYMBALTA)  "30 MG capsule, Take 1 capsule by mouth Daily., Disp: 7 capsule, Rfl: 0  •  DULoxetine (CYMBALTA) 60 MG capsule, Take 1 capsule by mouth Daily., Disp: 90 capsule, Rfl: 3  •  gabapentin (NEURONTIN) 300 MG capsule, Take 1 capsule by mouth 3 (Three) Times a Day., Disp: 90 capsule, Rfl: 1  •  Levonorgestrel (MIRENA, 52 MG, IU), by Intrauterine route., Disp: , Rfl:   •  SUMAtriptan (IMITREX) 100 MG tablet, Take 1 tablet by mouth 1 (One) Time As Needed for Migraine for up to 1 dose., Disp: 27 tablet, Rfl: 3    Objective     Vital Signs:   Vitals:    11/26/19 1511   BP: 120/74   Pulse: 76   Temp: 97.8 °F (36.6 °C)   SpO2: 98%   Weight: 84.8 kg (187 lb)   Height: 167.6 cm (66\")       Physical Exam:     General Appearance:    Alert, cooperative, in no acute distress   Head:    Normocephalic, without obvious abnormality, atraumatic   Eyes:            Lids and lashes normal, conjunctivae and sclerae normal, no   icterus, no pallor, corneas clear,   Ears:    Ears appear intact with no abnormalities noted   Throat:   No oral lesions, no thrush, oral mucosa moist   Breast:    Dense breast tissue noted bilaterally with upper outer quadrant tenderness   Lungs:     Clear to auscultation,respirations regular, even and                  Unlabored    Heart:    Regular rhythm and normal rate, no murmur, no gallop.   Chest Wall:    No abnormalities observed   Abdomen:     Normal bowel sounds, no masses, no organomegaly, soft        non-tender, non-distended, no guarding.   Extremities:   Moves all extremities well, no edema, no cyanosis, no             redness   Pulses:   Pulses palpable and equal bilaterally   Skin:   No bleeding, bruising or rash   Lymph nodes:   No palpable adenopathy   Neurologic:   Cranial nerves 2 - 12 grossly intact.           Results Review:   I reviewed the patient's new clinical results.  I reviewed the patient's new imaging results and agree with the interpretation.  I reviewed the patient's other test results " and agree with the interpretation      Assessment / Plan:    1. Cyst of breast, unspecified laterality        I did have a detailed and extensive discussion with the patient in the office today and reviewed her recent workup.  I have told the patient that she needs no surgical intervention at this time and I need to see her back in the office in 1 year after her next scheduled bilateral mammogram.    Electronically signed by Rizwan Perera MD  11/27/19  5:31 PM

## 2019-11-26 ENCOUNTER — OFFICE VISIT (OUTPATIENT)
Dept: SURGERY | Facility: CLINIC | Age: 39
End: 2019-11-26

## 2019-11-26 VITALS
SYSTOLIC BLOOD PRESSURE: 120 MMHG | WEIGHT: 187 LBS | TEMPERATURE: 97.8 F | HEIGHT: 66 IN | BODY MASS INDEX: 30.05 KG/M2 | HEART RATE: 76 BPM | DIASTOLIC BLOOD PRESSURE: 74 MMHG | OXYGEN SATURATION: 98 %

## 2019-11-26 DIAGNOSIS — N60.09 CYST OF BREAST, UNSPECIFIED LATERALITY: Primary | ICD-10-CM

## 2019-11-26 PROCEDURE — 99213 OFFICE O/P EST LOW 20 MIN: CPT | Performed by: SURGERY

## 2020-07-01 ENCOUNTER — OFFICE VISIT (OUTPATIENT)
Dept: OBSTETRICS AND GYNECOLOGY | Facility: CLINIC | Age: 40
End: 2020-07-01

## 2020-07-01 VITALS
SYSTOLIC BLOOD PRESSURE: 130 MMHG | HEIGHT: 66 IN | DIASTOLIC BLOOD PRESSURE: 68 MMHG | WEIGHT: 197 LBS | BODY MASS INDEX: 31.66 KG/M2

## 2020-07-01 DIAGNOSIS — Z01.419 ENCOUNTER FOR GYNECOLOGICAL EXAMINATION WITHOUT ABNORMAL FINDING: Primary | ICD-10-CM

## 2020-07-01 DIAGNOSIS — N63.20 LEFT BREAST MASS: ICD-10-CM

## 2020-07-01 PROCEDURE — 99396 PREV VISIT EST AGE 40-64: CPT | Performed by: OBSTETRICS & GYNECOLOGY

## 2020-07-01 NOTE — PROGRESS NOTES
Subjective   Chief Complaint   Patient presents with   • Gynecologic Exam     LAst pap 2019 WNL. Mammogram/ US done 2019, 2019     Heidi Franklin is a 39 y.o. year old  presenting to be seen for her annual exam. Mirena for 4 years  SEXUAL Hx:  She is currently sexually active.  In the past year there has not been new sexual partners.    Condoms are not typically used.  She would not like to be screened for STD's at today's exam.  Current birth control method: IUD - Mirena.  MENSTRUAL Hx:  No LMP recorded. Patient has had an implant.  In the past 6 months her cycles have been absent.   Her menstrual flow has been absent.   Each month on average there are roughly 0 days of very heavy flow.    Intermenstrual bleeding is absent.    Post-coital bleeding is absent.  Dysmenorrhea: is not affecting her activities of daily living  PMS: is not affecting her activities of daily living  Her cycles are not a source of concern for her that she wishes to discuss today.  HEALTH Hx:  She exercises regularly: no (and has no plans to become more active).  She wears her seat belt:yes.  She has concerns about domestic violence: no.  OTHER COMPLAINTS:  Nothing else    The following portions of the patient's history were reviewed and updated as appropriate:  She  has a past medical history of Anxiety, Arthritis, Carpal tunnel syndrome, Cervical disc herniation (2016), Encounter for insertion of mirena IUD (2016), Encounter for insertion of mirena IUD (2016), Headache, History of Papanicolaou smear of cervix (10/20/2015), History of Papanicolaou smear of cervix (2016), Low back pain, and Scoliosis.  She does not have any pertinent problems on file.  She  has a past surgical history that includes Breast surgery (Right);  section; Interventional radiology procedure (N/A, 2017); Other surgical history; Spine surgery (2017, 2018); Breast biopsy; and Breast  lumpectomy.  Her family history includes Arthritis in her maternal aunt, maternal aunt, and maternal grandmother; Breast cancer in her maternal aunt and maternal aunt; COPD in her mother and another family member; Cancer in her mother; Drug abuse in her maternal aunt.  She  reports that she has been smoking. She has a 22.50 pack-year smoking history. She has never used smokeless tobacco. She reports that she does not drink alcohol or use drugs.  Current Outpatient Medications   Medication Sig Dispense Refill   • acetaminophen (TYLENOL) 500 MG tablet Take 500 mg by mouth Every 6 (Six) Hours As Needed for mild pain (1-3).     • cyclobenzaprine (FLEXERIL) 5 MG tablet take 1 tablet by mouth three times a day if needed 90 tablet 5   • diazePAM (VALIUM) 5 MG tablet Take 5 mg by mouth 3 (Three) Times a Day As Needed for Muscle Spasms.  0   • gabapentin (NEURONTIN) 300 MG capsule Take 1 capsule by mouth 3 (Three) Times a Day. 90 capsule 1   • Levonorgestrel (MIRENA, 52 MG, IU) by Intrauterine route.     • SUMAtriptan (IMITREX) 100 MG tablet Take 1 tablet by mouth 1 (One) Time As Needed for Migraine for up to 1 dose. 27 tablet 3     No current facility-administered medications for this visit.      Current Outpatient Medications on File Prior to Visit   Medication Sig   • acetaminophen (TYLENOL) 500 MG tablet Take 500 mg by mouth Every 6 (Six) Hours As Needed for mild pain (1-3).   • cyclobenzaprine (FLEXERIL) 5 MG tablet take 1 tablet by mouth three times a day if needed   • diazePAM (VALIUM) 5 MG tablet Take 5 mg by mouth 3 (Three) Times a Day As Needed for Muscle Spasms.   • gabapentin (NEURONTIN) 300 MG capsule Take 1 capsule by mouth 3 (Three) Times a Day.   • Levonorgestrel (MIRENA, 52 MG, IU) by Intrauterine route.   • SUMAtriptan (IMITREX) 100 MG tablet Take 1 tablet by mouth 1 (One) Time As Needed for Migraine for up to 1 dose.   • [DISCONTINUED] buPROPion XL (WELLBUTRIN XL) 150 MG 24 hr tablet Take 1 tablet by mouth  "Every Morning.   • [DISCONTINUED] DULoxetine (CYMBALTA) 30 MG capsule Take 1 capsule by mouth Daily.   • [DISCONTINUED] DULoxetine (CYMBALTA) 60 MG capsule Take 1 capsule by mouth Daily.     No current facility-administered medications on file prior to visit.      She is allergic to citalopram..    Social History    Tobacco Use      Smoking status: Current Every Day Smoker        Packs/day: 1.50        Years: 15.00        Pack years: 22.5      Smokeless tobacco: Never Used    Review of Systems  Consitutional NEG: anorexia or night sweats    POS: nothing reported   Gastointestinal NEG: bloating, change in bowel habits, melena or reflux symptoms    POS: nothing reported   Genitourinary NEG: dysuria or hematuria    POS: nothing reported   Integument NEG: moles that are changing in size, shape, color or rashes    POS: nothing reported   Breast NEG: persistent breast lump, skin dimpling or nipple discharge    POS: nothing reported          Objective   /68   Ht 167.6 cm (66\")   Wt 89.4 kg (197 lb)   BMI 31.80 kg/m²     General:  well developed; well nourished  no acute distress   Skin:  No suspicious lesions seen   Thyroid: normal to inspection and palpation   Breasts:  Examined in supine position  Symmetric without masses or skin dimpling  Nipples normal without inversion, lesions or discharge  There are no palpable axillary nodes   Abdomen: soft, non-tender; no masses  no umbilical or inguinal hernias are present  no hepato-splenomegaly   Pelvis: Clinical staff was present for exam  External genitalia:  normal appearance of the external genitalia including Bartholin's and Fouke's glands.  :  urethral meatus normal;  Vaginal:  normal pink mucosa without prolapse or lesions.  Cervix:  normal appearance.  Uterus:  normal size, shape and consistency.  Adnexa:  normal bimanual exam of the adnexa.  Rectal:  digital rectal exam not performed; anus visually normal appearing.        Assessment   1. Normal PE     Plan "   1. PAP done  2. LEft breast mass 3x2cm superior to areolar region midline ref to Dilma  3. Follow up     No orders of the defined types were placed in this encounter.         This note was electronically signed.      July 1, 2020

## 2020-07-02 ENCOUNTER — OFFICE VISIT (OUTPATIENT)
Dept: INTERNAL MEDICINE | Facility: CLINIC | Age: 40
End: 2020-07-02

## 2020-07-02 VITALS
WEIGHT: 198.38 LBS | BODY MASS INDEX: 31.88 KG/M2 | HEART RATE: 94 BPM | HEIGHT: 66 IN | OXYGEN SATURATION: 97 % | DIASTOLIC BLOOD PRESSURE: 75 MMHG | TEMPERATURE: 98.2 F | SYSTOLIC BLOOD PRESSURE: 138 MMHG

## 2020-07-02 DIAGNOSIS — M54.2 CHRONIC NECK PAIN: ICD-10-CM

## 2020-07-02 DIAGNOSIS — G89.29 CHRONIC NECK PAIN: ICD-10-CM

## 2020-07-02 DIAGNOSIS — M48.02 SPINAL STENOSIS IN CERVICAL REGION: ICD-10-CM

## 2020-07-02 DIAGNOSIS — G43.909 MIGRAINE WITHOUT STATUS MIGRAINOSUS, NOT INTRACTABLE, UNSPECIFIED MIGRAINE TYPE: ICD-10-CM

## 2020-07-02 DIAGNOSIS — F33.1 MODERATE EPISODE OF RECURRENT MAJOR DEPRESSIVE DISORDER (HCC): Primary | ICD-10-CM

## 2020-07-02 DIAGNOSIS — M50.20 CERVICAL DISC HERNIATION: ICD-10-CM

## 2020-07-02 DIAGNOSIS — M54.12 CERVICAL RADICULOPATHY: ICD-10-CM

## 2020-07-02 PROCEDURE — 99214 OFFICE O/P EST MOD 30 MIN: CPT | Performed by: FAMILY MEDICINE

## 2020-07-02 RX ORDER — GABAPENTIN 300 MG/1
300 CAPSULE ORAL 3 TIMES DAILY
Qty: 270 CAPSULE | Refills: 1 | Status: SHIPPED | OUTPATIENT
Start: 2020-07-02 | End: 2021-02-12

## 2020-07-02 RX ORDER — DIAZEPAM 5 MG/1
5 TABLET ORAL 3 TIMES DAILY PRN
Qty: 90 TABLET | Refills: 1 | Status: SHIPPED | OUTPATIENT
Start: 2020-07-02 | End: 2021-06-28

## 2020-07-02 RX ORDER — HYDROCODONE BITARTRATE AND ACETAMINOPHEN 10; 325 MG/1; MG/1
1 TABLET ORAL EVERY 8 HOURS PRN
COMMUNITY
End: 2022-08-30 | Stop reason: DRUGHIGH

## 2020-07-02 RX ORDER — DULOXETIN HYDROCHLORIDE 30 MG/1
30 CAPSULE, DELAYED RELEASE ORAL DAILY
Qty: 14 CAPSULE | Refills: 0 | Status: SHIPPED | OUTPATIENT
Start: 2020-07-02 | End: 2020-07-16

## 2020-07-02 RX ORDER — SUMATRIPTAN 100 MG/1
100 TABLET, FILM COATED ORAL ONCE AS NEEDED
Qty: 27 TABLET | Refills: 3 | Status: SHIPPED | OUTPATIENT
Start: 2020-07-02 | End: 2021-07-01 | Stop reason: SINTOL

## 2020-07-02 RX ORDER — DULOXETIN HYDROCHLORIDE 60 MG/1
60 CAPSULE, DELAYED RELEASE ORAL DAILY
Qty: 90 CAPSULE | Refills: 3 | Status: SHIPPED | OUTPATIENT
Start: 2020-07-02 | End: 2021-06-28

## 2020-07-02 NOTE — PROGRESS NOTES
Subjective    Heidi Franklin is a 39 y.o. female here for:  Chief Complaint   Patient presents with   • Depression     She stopped Cymbalta by the end of march (she thought her insurance has ending so she stopped it). However, her employer paid her premium through May. However, since being off, she can really tell it helped her depression and she would like a new prescription today.        History per MA reviewed.    S/p several surgeries on cervical spine, wearing a bone stimulator now to help with fusion as this previously did not happen naturally. Followed by specialists. She continues to bella pain in neck and numbness in hands, drops things often and has a hard time picking things up. Has plenty of flexeril at home, needs refill on valium, has a few left but takes only on occasion for severe muscle spasms.     Realized Cymbalta was helping mood when she stopped taking (thought she would not have insurance) as she was very gomez per . Tearful. No SI. She would like to go back on medicine, was on 60 mg due to chronic pain.    Migraines a chronic issue x years. Possibly headaches related to C-spine disease. Imitrex helps but she feels badly when she takes it. Open to other options.         PHQ-9 Depression Screening  Little interest or pleasure in doing things? 3   Feeling down, depressed, or hopeless? 2   Trouble falling or staying asleep, or sleeping too much? 0   Feeling tired or having little energy? 3   Poor appetite or overeating? 0   Feeling bad about yourself - or that you are a failure or have let yourself or your family down? 1   Trouble concentrating on things, such as reading the newspaper or watching television? 1   Moving or speaking so slowly that other people could have noticed? Or the opposite - being so fidgety or restless that you have been moving around a lot more than usual? 1   Thoughts that you would be better off dead, or of hurting yourself in some way? 0   PHQ-9 Total Score 11  "  If you checked off any problems, how difficult have these problems made it for you to do your work, take care of things at home, or get along with other people? Somewhat difficult         The following portions of the patient's history were reviewed and updated as appropriate: allergies, current medications, past family history, past medical history, past social history, past surgical history and problem list.    Review of Systems   Constitutional: Negative for fever.   HENT: Negative for sore throat.    Musculoskeletal: Positive for arthralgias and neck pain.   Neurological: Positive for numbness.   Psychiatric/Behavioral: Positive for depressed mood and stress.       Visit Vitals  /75   Pulse 94   Temp 98.2 °F (36.8 °C) (Temporal)   Ht 167.6 cm (65.98\")   Wt 90 kg (198 lb 6 oz)   SpO2 97%   BMI 32.03 kg/m²         Objective   Physical Exam   Constitutional: She is oriented to person, place, and time. Vital signs are normal. She appears well-developed and well-nourished. She is active.  Non-toxic appearance. She does not have a sickly appearance. She does not appear ill. No distress. Face mask in place.   Wearing a device wrapped around neck (bone stimulator) She is obese.  HENT:   Head: Normocephalic and atraumatic.   Right Ear: Hearing normal.   Left Ear: Hearing normal.   Eyes: EOM are normal. Right eye exhibits no discharge. Left eye exhibits no discharge. No scleral icterus.   Neck: Phonation normal. Neck supple.   Pulmonary/Chest: Effort normal.   Neurological: She is alert and oriented to person, place, and time. She displays no tremor. No cranial nerve deficit.   Skin: Skin is warm. No rash noted. She is not diaphoretic. No pallor.   Psychiatric: She has a normal mood and affect. Her speech is normal and behavior is normal. Judgment and thought content normal. Cognition and memory are normal.   Nursing note and vitals reviewed.        Assessment/Plan     Problem List Items Addressed This Visit     "    Cardiovascular and Mediastinum    Headache, migraine    Relevant Medications    HYDROcodone-acetaminophen (NORCO)  MG per tablet    DULoxetine (Cymbalta) 60 MG capsule    DULoxetine (Cymbalta) 30 MG capsule    gabapentin (NEURONTIN) 300 MG capsule    SUMAtriptan (IMITREX) 100 MG tablet    Rimegepant Sulfate (rimegepant) 75 MG tablet dispersible    Rimegepant Sulfate (rimegepant) 75 MG tablet dispersible       Nervous and Auditory    Chronic neck pain    Relevant Medications    gabapentin (NEURONTIN) 300 MG capsule    diazePAM (VALIUM) 5 MG tablet    Cervical radiculopathy    Relevant Medications    gabapentin (NEURONTIN) 300 MG capsule       Musculoskeletal and Integument    Cervical disc herniation    Relevant Medications    gabapentin (NEURONTIN) 300 MG capsule       Other    Spinal stenosis in cervical region    Relevant Medications    gabapentin (NEURONTIN) 300 MG capsule      Other Visit Diagnoses     Moderate episode of recurrent major depressive disorder (CMS/HCC)    -  Primary    Relevant Medications    DULoxetine (Cymbalta) 60 MG capsule    DULoxetine (Cymbalta) 30 MG capsule    diazePAM (VALIUM) 5 MG tablet        CONTROLLED SUBSTANCE TRACKING 10/7/2019 7/2/2020   Ector Farmer 10/7/2019 7/2/2020   Report Number [id number]          · Follow up with specialists regarding cervical spine issues  · Resuming Cymbalta, tapering back up to optimal dose for pain  · Follow up in six weeks via video, scheduled with patient.  · Refilled Imitrex for migraines but discussed newer agents, sample of Nurtec given, discussed, and sent rx. Not to use both Imitrex and Nurtec.    Martine Evans MD

## 2020-07-10 DIAGNOSIS — Z01.419 ENCOUNTER FOR GYNECOLOGICAL EXAMINATION WITHOUT ABNORMAL FINDING: ICD-10-CM

## 2020-07-13 ENCOUNTER — OFFICE VISIT (OUTPATIENT)
Dept: SURGERY | Facility: CLINIC | Age: 40
End: 2020-07-13

## 2020-07-13 VITALS
BODY MASS INDEX: 31.34 KG/M2 | HEART RATE: 113 BPM | DIASTOLIC BLOOD PRESSURE: 84 MMHG | WEIGHT: 195 LBS | TEMPERATURE: 97.3 F | OXYGEN SATURATION: 99 % | SYSTOLIC BLOOD PRESSURE: 130 MMHG | HEIGHT: 66 IN

## 2020-07-13 DIAGNOSIS — N63.0 BREAST MASS: Primary | ICD-10-CM

## 2020-07-13 PROCEDURE — 10006 FNA BX W/US GDN EA ADDL: CPT | Performed by: SURGERY

## 2020-07-13 PROCEDURE — 99213 OFFICE O/P EST LOW 20 MIN: CPT | Performed by: SURGERY

## 2020-07-13 PROCEDURE — 10005 FNA BX W/US GDN 1ST LES: CPT | Performed by: SURGERY

## 2020-07-13 NOTE — PROGRESS NOTES
Patient: Heidi Franklin    YOB: 1980    Date: 07/13/2020    Primary Care Provider: Martine Evans MD    Chief Complaint   Patient presents with   • Breast Mass       Subjective .     History of present illness:  I saw the patient in the office  today for evaluation and treatment of a breast mass. Patient had an ultrasound on 11/16/2019 that did show dense breast tissue bilaterally associated with multiple cystic masses of both breasts. She complains of bilateral palpable breast mass. She complains of associated tenderness. She denies nipple drainage and skin discoloration.     She does complain of bilateral breast pain, associated with palpable mass, sharp in nature, worse with pressure, not relieved, nonradiating, present for several weeks.    The following portions of the patient's history were reviewed and updated as appropriate: allergies, current medications, past family history, past medical history, past social history, past surgical history and problem list.    Review of Systems   Constitutional: Negative for chills, fever and unexpected weight change.   HENT: Negative for hearing loss, trouble swallowing and voice change.    Eyes: Negative for visual disturbance.   Respiratory: Negative for apnea, cough, chest tightness, shortness of breath and wheezing.    Cardiovascular: Negative for chest pain, palpitations and leg swelling.   Gastrointestinal: Negative for abdominal distention, abdominal pain, anal bleeding, blood in stool, constipation, diarrhea, nausea, rectal pain and vomiting.   Endocrine: Negative for cold intolerance and heat intolerance.   Genitourinary: Negative for difficulty urinating, dysuria and flank pain.   Musculoskeletal: Negative for back pain and gait problem.   Skin: Negative for color change, rash and wound.   Neurological: Negative for dizziness, syncope, speech difficulty, weakness, light-headedness, numbness and headaches.   Hematological: Negative for  adenopathy. Does not bruise/bleed easily.   Psychiatric/Behavioral: Negative for confusion. The patient is not nervous/anxious.        History:  Past Medical History:   Diagnosis Date   • Anxiety    • Arthritis    • Carpal tunnel syndrome     right   • Cervical disc herniation 2016   • Encounter for insertion of mirena IUD 2016   • Encounter for insertion of mirena IUD 2016   • Headache    • History of Papanicolaou smear of cervix 10/20/2015    WNL   • History of Papanicolaou smear of cervix 2016    WNL   • Low back pain    • Scoliosis           Past Surgical History:   Procedure Laterality Date   • BREAST BIOPSY     • BREAST LUMPECTOMY      right   • BREAST SURGERY Right    •  SECTION     • INTERVENTIONAL RADIOLOGY PROCEDURE N/A 2017    Procedure:  myelogram cervical spine;  Surgeon: Moody Fitch MD;  Location: [facility] INVASIVE LOCATION;  Service:    • OTHER SURGICAL HISTORY      Breast Mammatome, Needle Biopsy, ACDF    • SPINE SURGERY  2017, 2018    ACDF C5-6 and C6-7, ACDF C4-5   • SPINE SURGERY  2019    3rd attempt       Family History   Problem Relation Age of Onset   • COPD Other    • Arthritis Maternal Aunt         Spine   • Breast cancer Maternal Aunt    • Arthritis Maternal Aunt         Spine   • Drug abuse Maternal Aunt             • Breast cancer Maternal Aunt    • Arthritis Maternal Grandmother         Spine   • Cancer Mother         Throat Cancer   • COPD Mother                 Social History     Tobacco Use   • Smoking status: Current Every Day Smoker     Packs/day: 1.50     Years: 15.00     Pack years: 22.50   • Smokeless tobacco: Never Used   Substance Use Topics   • Alcohol use: No   • Drug use: No       Allergies:  Allergies   Allergen Reactions   • Citalopram Other (See Comments)       Medications:     Current Outpatient Medications:   •  acetaminophen (TYLENOL) 500 MG tablet, Take 500 mg by mouth Every 6  "(Six) Hours As Needed for mild pain (1-3)., Disp: , Rfl:   •  cyclobenzaprine (FLEXERIL) 5 MG tablet, take 1 tablet by mouth three times a day if needed, Disp: 90 tablet, Rfl: 5  •  diazePAM (VALIUM) 5 MG tablet, Take 1 tablet by mouth 3 (Three) Times a Day As Needed for Muscle Spasms., Disp: 90 tablet, Rfl: 1  •  DULoxetine (Cymbalta) 30 MG capsule, Take 1 capsule by mouth Daily for 14 days. Then start 60 mg daily, Disp: 14 capsule, Rfl: 0  •  DULoxetine (Cymbalta) 60 MG capsule, Take 1 capsule by mouth Daily., Disp: 90 capsule, Rfl: 3  •  gabapentin (NEURONTIN) 300 MG capsule, Take 1 capsule by mouth 3 (Three) Times a Day., Disp: 270 capsule, Rfl: 1  •  HYDROcodone-acetaminophen (NORCO)  MG per tablet, Take 1 tablet by mouth Every 8 (Eight) Hours As Needed for Moderate Pain  or Severe Pain ., Disp: , Rfl:   •  Levonorgestrel (MIRENA, 52 MG, IU), by Intrauterine route., Disp: , Rfl:   •  Rimegepant Sulfate (rimegepant) 75 MG tablet dispersible, Take 75 mg by mouth Daily As Needed (migraine)., Disp: 8 tablet, Rfl: 1  •  Rimegepant Sulfate (rimegepant) 75 MG tablet dispersible, Take 75 mg by mouth Daily As Needed (migraine)., Disp: 2 tablet, Rfl: 0  •  SUMAtriptan (IMITREX) 100 MG tablet, Take 1 tablet by mouth 1 (One) Time As Needed for Migraine for up to 1 dose., Disp: 27 tablet, Rfl: 3    Objective     Vital Signs:   Vitals:    07/13/20 1037   BP: 130/84   Pulse: 113   Temp: 97.3 °F (36.3 °C)   SpO2: 99%   Weight: 88.5 kg (195 lb)   Height: 167.6 cm (65.98\")       Physical Exam:     General Appearance:    Alert, cooperative, in no acute distress   Head:    Normocephalic, without obvious abnormality, atraumatic   Eyes:            Lids and lashes normal, conjunctivae and sclerae normal, no   icterus, no pallor, corneas clear,   Ears:    Ears appear intact with no abnormalities noted   Throat:   No oral lesions, no thrush, oral mucosa moist   Breast:     Bilateral breast masses in upper quadrants   Lungs:     " Clear to auscultation,respirations regular, even and                  Unlabored    Heart:    Regular rhythm and normal rate, no murmur, no gallop.   Chest Wall:    No abnormalities observed   Abdomen:     Normal bowel sounds, no masses, no organomegaly, soft        non-tender, non-distended, no guarding.   Extremities:   Moves all extremities well, no edema, no cyanosis, no             redness   Pulses:   Pulses palpable and equal bilaterally   Skin:   No bleeding, bruising or rash   Lymph nodes:   No palpable adenopathy   Neurologic:   Cranial nerves 2 - 12 grossly intact.           Results Review:   I reviewed the patient's new clinical results.  I reviewed the patient's new imaging results and agree with the interpretation.  I reviewed the patient's other test results and agree with the interpretation    Review of Systems was reviewed and confirmed as accurate as documented by the MA.    Assessment / Plan:    1. Breast mass        I did have a detailed and extensive discussion with the patient in the office today and reviewed her recent workup.  I have told the patient that she needs to undergo bilateral fine-needle aspiration of these breast cyst.  This was performed today without difficulty, cells were sent for cytopathology, I want to see her back in the office after her next scheduled bilateral mammogram.    Procedure:    I recommend a FNA of the left and right breast lesion. The procedure and risks were clearly explained including bleeding, infection and requirement for re-biopsy and the patient understood these and wishes to proceed.    The patient was brought to the procedure room. Consent and time out were performed. The area was prepped and draped in the usual fashion. 1% lidocaine with epinephrine was infused locally. A 20G needle was used for biopsy under ultrasound guidance. Minimal blood loss had occurred and hemostasis had been well controlled with pressure.    Likewise the left breast was prepped  and draped in the normal sterile fashion..The area was prepped and draped in the usual fashion. 1% lidocaine with epinephrine was infused locally. A 20G needle was used for biopsy under ultrasound guidance. Minimal blood loss had occurred and hemostasis had been well controlled with pressure. The patient tolerated the procedure and there were no complications. We will make a f/u appointment to discuss results.      Electronically signed by Rizwan Perera MD  07/15/20  08:10    Portions of this note has been scribed for Rizwan Perera MD by Reyna Muro. 7/15/2020  08:10

## 2020-07-20 ENCOUNTER — TRANSCRIBE ORDERS (OUTPATIENT)
Dept: ADMINISTRATIVE | Facility: HOSPITAL | Age: 40
End: 2020-07-20

## 2020-07-20 DIAGNOSIS — Z12.31 ENCOUNTER FOR SCREENING MAMMOGRAM FOR MALIGNANT NEOPLASM OF BREAST: Primary | ICD-10-CM

## 2020-07-22 ENCOUNTER — TELEPHONE (OUTPATIENT)
Dept: SURGERY | Facility: CLINIC | Age: 40
End: 2020-07-22

## 2020-07-22 NOTE — TELEPHONE ENCOUNTER
Per Dr. Perera I called pt regarding scheduling a follow-up appt for bilateral breast fine needle aspiration. I left a message on her voice mail asking her to return my call.

## 2020-08-04 ENCOUNTER — OFFICE VISIT (OUTPATIENT)
Dept: SURGERY | Facility: CLINIC | Age: 40
End: 2020-08-04

## 2020-08-04 VITALS
HEIGHT: 66 IN | WEIGHT: 199 LBS | HEART RATE: 120 BPM | BODY MASS INDEX: 31.98 KG/M2 | TEMPERATURE: 98.4 F | DIASTOLIC BLOOD PRESSURE: 84 MMHG | OXYGEN SATURATION: 97 % | SYSTOLIC BLOOD PRESSURE: 128 MMHG

## 2020-08-04 DIAGNOSIS — N63.0 BREAST MASS: Primary | ICD-10-CM

## 2020-08-04 PROCEDURE — 99213 OFFICE O/P EST LOW 20 MIN: CPT | Performed by: SURGERY

## 2020-08-04 NOTE — PROGRESS NOTES
Patient: Heidi Franklin  YOB: 1980    Date: 08/04/2020    Primary Care Provider: Martine Evans MD    Chief Complaint   Patient presents with   • Follow-up     jessica breast aspirations       History: Patient is here for follow-up bilateral breast fine needle aspirations which were performed in the office 07/13/2020.  Pathology report showed atypical epithelial cells and rare ductal cells with atypia in both breasts.  She states that she feels another mass in the upper left breast.    She reports some aspects of discomfort in the upper aspects of both breasts.    The following portions of the patient's history were reviewed and updated as appropriate: allergies, current medications, past family history, past medical history, past social history, past surgical history and problem list.    Review of Systems   Constitutional: Negative for chills, fever and unexpected weight change.   HENT: Negative for hearing loss, trouble swallowing and voice change.    Eyes: Negative for visual disturbance.   Respiratory: Negative for apnea, cough, chest tightness, shortness of breath and wheezing.    Cardiovascular: Negative for chest pain, palpitations and leg swelling.   Gastrointestinal: Negative for abdominal distention, abdominal pain, anal bleeding, blood in stool, constipation, diarrhea, nausea, rectal pain and vomiting.   Endocrine: Negative for cold intolerance and heat intolerance.   Genitourinary: Negative for difficulty urinating, dysuria and flank pain.   Musculoskeletal: Negative for back pain and gait problem.   Skin: Negative for color change, rash and wound.   Neurological: Negative for dizziness, syncope, speech difficulty, weakness, light-headedness, numbness and headaches.   Hematological: Negative for adenopathy. Does not bruise/bleed easily.   Psychiatric/Behavioral: Negative for confusion. The patient is not nervous/anxious.        Vital Signs  Vitals:    08/04/20 1509   BP: 128/84   Pulse:  "120   Temp: 98.4 °F (36.9 °C)   TempSrc: Temporal   SpO2: 97%   Weight: 90.3 kg (199 lb)   Height: 167.6 cm (65.98\")       Allergies:  Allergies   Allergen Reactions   • Citalopram Other (See Comments)       Medications:    Current Outpatient Medications:   •  acetaminophen (TYLENOL) 500 MG tablet, Take 500 mg by mouth Every 6 (Six) Hours As Needed for mild pain (1-3)., Disp: , Rfl:   •  cyclobenzaprine (FLEXERIL) 5 MG tablet, take 1 tablet by mouth three times a day if needed, Disp: 90 tablet, Rfl: 5  •  diazePAM (VALIUM) 5 MG tablet, Take 1 tablet by mouth 3 (Three) Times a Day As Needed for Muscle Spasms., Disp: 90 tablet, Rfl: 1  •  DULoxetine (Cymbalta) 60 MG capsule, Take 1 capsule by mouth Daily., Disp: 90 capsule, Rfl: 3  •  gabapentin (NEURONTIN) 300 MG capsule, Take 1 capsule by mouth 3 (Three) Times a Day., Disp: 270 capsule, Rfl: 1  •  HYDROcodone-acetaminophen (NORCO)  MG per tablet, Take 1 tablet by mouth Every 8 (Eight) Hours As Needed for Moderate Pain  or Severe Pain ., Disp: , Rfl:   •  Levonorgestrel (MIRENA, 52 MG, IU), by Intrauterine route., Disp: , Rfl:   •  Rimegepant Sulfate (rimegepant) 75 MG tablet dispersible, Take 75 mg by mouth Daily As Needed (migraine)., Disp: 8 tablet, Rfl: 1  •  Rimegepant Sulfate (rimegepant) 75 MG tablet dispersible, Take 75 mg by mouth Daily As Needed (migraine)., Disp: 2 tablet, Rfl: 0  •  SUMAtriptan (IMITREX) 100 MG tablet, Take 1 tablet by mouth 1 (One) Time As Needed for Migraine for up to 1 dose., Disp: 27 tablet, Rfl: 3    Physical Exam:   General Appearance:    Alert, cooperative, in no acute distress   Head:    Normocephalic, without obvious abnormality, atraumatic   Lungs:     Clear to auscultation,respirations regular, even and                  unlabored    Heart:    Regular rhythm and normal rate, normal S1 and S2, no            murmur, no gallop, no rub, no click   Abdomen:     Normal bowel sounds, no masses, no organomegaly, soft        " non-tender, non-distended, no guarding, no rebound                tenderness   Extremities:   Moves all extremities well, no edema, no cyanosis, no             redness   Pulses:   Pulses palpable and equal bilaterally   Skin:   No bleeding, bruising or rash, nodularity palpable in the upper aspects of both breasts     Results Review:   I reviewed the patient's new clinical results.  I reviewed the patient's new imaging results and agree with the interpretation.  I reviewed the patient's other test results and agree with the interpretation     Review of Systems was reviewed and confirmed as accurate as documented by the MA.    ASSESSMENT/PLAN:    1. Breast mass       I did have a detailed and extensive discussion with the patient in the office today and reviewed her pathology report.  I would like to see her back in the office in 1 week and work on a repeat her breast ultrasound at that time.  She may need future open breast biopsy versus mammotome biopsy.    Electronically signed by Rizwan Perera MD  08/06/20    Portions of this note have been scribed for Rizwan Perera MD by Michelle Milligan. 8/6/2020  16:35

## 2020-08-10 ENCOUNTER — OFFICE VISIT (OUTPATIENT)
Dept: SURGERY | Facility: CLINIC | Age: 40
End: 2020-08-10

## 2020-08-10 VITALS
DIASTOLIC BLOOD PRESSURE: 86 MMHG | OXYGEN SATURATION: 96 % | BODY MASS INDEX: 31.99 KG/M2 | SYSTOLIC BLOOD PRESSURE: 130 MMHG | HEIGHT: 66 IN | HEART RATE: 106 BPM | TEMPERATURE: 97.7 F | WEIGHT: 199.08 LBS

## 2020-08-10 DIAGNOSIS — N60.09 CYST OF BREAST, UNSPECIFIED LATERALITY: Primary | ICD-10-CM

## 2020-08-10 PROCEDURE — 99213 OFFICE O/P EST LOW 20 MIN: CPT | Performed by: SURGERY

## 2020-08-10 NOTE — PROGRESS NOTES
Patient: Heidi Franklin  YOB: 1980    Date: 08/10/2020    Primary Care Provider: Martine Evans MD    Chief Complaint   Patient presents with   • Follow-up     breast biopsy       History: Patient is here for follow-up bilateral breast fine needle aspirations which were performed in the office 07/13/2020.  Pathology report showed atypical epithelial cells and rare ductal cells with atypia in both breasts. Patient is here for a repeat ultrasound. She states she has not had any changes since her last visit.     Bilateral breast fine-needle aspiration was performed as mentioned above that did show evidence of atypia.    The following portions of the patient's history were reviewed and updated as appropriate: allergies, current medications, past family history, past medical history, past social history, past surgical history and problem list.    Review of Systems   Constitutional: Negative for chills, fever and unexpected weight change.   HENT: Negative for hearing loss, trouble swallowing and voice change.    Eyes: Negative for visual disturbance.   Respiratory: Negative for apnea, cough, chest tightness, shortness of breath and wheezing.    Cardiovascular: Negative for chest pain, palpitations and leg swelling.   Gastrointestinal: Negative for abdominal distention, abdominal pain, anal bleeding, blood in stool, constipation, diarrhea, nausea, rectal pain and vomiting.   Endocrine: Negative for cold intolerance and heat intolerance.   Genitourinary: Negative for difficulty urinating, dysuria and flank pain.   Musculoskeletal: Negative for back pain and gait problem.   Skin: Negative for color change, rash and wound.   Neurological: Negative for dizziness, syncope, speech difficulty, weakness, light-headedness, numbness and headaches.   Hematological: Negative for adenopathy. Does not bruise/bleed easily.   Psychiatric/Behavioral: Negative for confusion. The patient is not nervous/anxious.   "      Vital Signs  Vitals:    08/10/20 0903   BP: 130/86   Pulse: 106   Temp: 97.7 °F (36.5 °C)   SpO2: 96%   Weight: 90.3 kg (199 lb 1.2 oz)   Height: 167.6 cm (65.98\")       Allergies:  Allergies   Allergen Reactions   • Citalopram Other (See Comments)       Medications:    Current Outpatient Medications:   •  acetaminophen (TYLENOL) 500 MG tablet, Take 500 mg by mouth Every 6 (Six) Hours As Needed for mild pain (1-3)., Disp: , Rfl:   •  cyclobenzaprine (FLEXERIL) 5 MG tablet, take 1 tablet by mouth three times a day if needed, Disp: 90 tablet, Rfl: 5  •  diazePAM (VALIUM) 5 MG tablet, Take 1 tablet by mouth 3 (Three) Times a Day As Needed for Muscle Spasms., Disp: 90 tablet, Rfl: 1  •  DULoxetine (Cymbalta) 60 MG capsule, Take 1 capsule by mouth Daily., Disp: 90 capsule, Rfl: 3  •  gabapentin (NEURONTIN) 300 MG capsule, Take 1 capsule by mouth 3 (Three) Times a Day., Disp: 270 capsule, Rfl: 1  •  HYDROcodone-acetaminophen (NORCO)  MG per tablet, Take 1 tablet by mouth Every 8 (Eight) Hours As Needed for Moderate Pain  or Severe Pain ., Disp: , Rfl:   •  Levonorgestrel (MIRENA, 52 MG, IU), by Intrauterine route., Disp: , Rfl:   •  Rimegepant Sulfate (rimegepant) 75 MG tablet dispersible, Take 75 mg by mouth Daily As Needed (migraine)., Disp: 8 tablet, Rfl: 1  •  Rimegepant Sulfate (rimegepant) 75 MG tablet dispersible, Take 75 mg by mouth Daily As Needed (migraine)., Disp: 2 tablet, Rfl: 0  •  SUMAtriptan (IMITREX) 100 MG tablet, Take 1 tablet by mouth 1 (One) Time As Needed for Migraine for up to 1 dose., Disp: 27 tablet, Rfl: 3    Physical Exam:   General Appearance:    Alert, cooperative, in no acute distress   Head:    Normocephalic, without obvious abnormality, atraumatic   Lungs:     Clear to auscultation,respirations regular, even and                  unlabored    Heart:    Regular rhythm and normal rate, normal S1 and S2, no            murmur, no gallop, no rub, no click   Abdomen:     Normal bowel " sounds, no masses, no organomegaly, soft        non-tender, non-distended, no guarding, no rebound                tenderness   Extremities:   Moves all extremities well, no edema, no cyanosis, no             redness   Pulses:   Pulses palpable and equal bilaterally   Skin:   No bleeding, bruising or rash, palpable nodules in the 1 o'clock position of each breast     Results Review:   I reviewed the patient's new clinical results.  I reviewed the patient's new imaging results and agree with the interpretation.  I reviewed the patient's other test results and agree with the interpretation     Review of Systems was reviewed and confirmed as accurate as documented by the MA.    ASSESSMENT/PLAN:    1. Cyst of breast, unspecified laterality       I had a clear detailed and extensive discussion with the patient in the office today regarding her recent pathology report that showed evidence of atypia.  We are going to proceed with right and left breast mammotome biopsies, risk and benefits of operative versus nonoperative intervention been discussed with the patient, she understands and agrees, and wishes to proceed.    Electronically signed by Rizwan Perera MD  08/10/20    Portions of this note has been scribed for Rizwan Perera MD by Reyna Muro. 8/10/2020  10:02

## 2020-08-13 ENCOUNTER — TELEMEDICINE (OUTPATIENT)
Dept: INTERNAL MEDICINE | Facility: CLINIC | Age: 40
End: 2020-08-13

## 2020-08-13 DIAGNOSIS — G43.909 MIGRAINE WITHOUT STATUS MIGRAINOSUS, NOT INTRACTABLE, UNSPECIFIED MIGRAINE TYPE: ICD-10-CM

## 2020-08-13 DIAGNOSIS — F33.1 MODERATE EPISODE OF RECURRENT MAJOR DEPRESSIVE DISORDER (HCC): Primary | ICD-10-CM

## 2020-08-13 PROCEDURE — 99213 OFFICE O/P EST LOW 20 MIN: CPT | Performed by: FAMILY MEDICINE

## 2020-08-13 NOTE — PROGRESS NOTES
You have chosen to receive care through a telehealth visit.  Do you consent to use a video/audio connection for your medical care today? Yes    On this video visit is Alanna NAVAS CMA, Dr. Evans, and Heidi Franklin.

## 2020-08-13 NOTE — PROGRESS NOTES
Subjective    Heidi Franklin is a 40 y.o. female here for:  Chief Complaint   Patient presents with   • Depression     Pt states her depression is getting better. She is feeling better.   • Migraine     Pt took the new migraine medication once and it helped. Only had 1 bad migraine since last visit.        Patient presents for a virtual visit. This visit was scheduled as a video visit to comply with patient safety concerns in accordance with CDC recommendations.     Last visit 7/2/20:  · Follow up with specialists regarding cervical spine issues  · Resuming Cymbalta, tapering back up to optimal dose for pain  · Follow up in six weeks via video, scheduled with patient.  · Refilled Imitrex for migraines but discussed newer agents, sample of Nurtec given, discussed, and sent rx. Not to use both Imitrex and Nurtec.    Mood improved with Cymbalta.    Migraines improved with new abortive therapy. Insurance covering.    No side effects appreciated from med changes.         During today's visit, I reviewed the documented allergies, medications, chief complaint, and pertinent vitals.  I have confirmed with the patient that there have been no changes since this information was discussed with my clinical team member.    The following portions of the patient's history were reviewed and updated as appropriate: allergies, current medications, past family history, past medical history, past social history, past surgical history and problem list.    Review of Systems   Constitutional: Negative for fever.   Musculoskeletal: Positive for back pain (chronic).       There were no vitals taken for this visit.      Objective   Nursing note reviewed.  General: healthy appearing, no distress.  HENT: no facial deformities, hearing is within normal limits   Eyes: EOM intact, no obvious nystagmus.  Neck: phonation normal. No stridor  Pulm: Normal work of breathing  Neuro: A&O x 3. No abnormal movements.  Skin: No rashes appreciated to face.  No visible cyanosis  Psych: Mood and affect appropriate. Insight normal. Judgement appropriate. Behavior normal. Memory normal.       Assessment/Plan     Problem List Items Addressed This Visit        Cardiovascular and Mediastinum    Headache, migraine      Other Visit Diagnoses     Moderate episode of recurrent major depressive disorder (CMS/HCC)    -  Primary          · Continue current medicines. Follow up with specialists regarding back issues.    Martine Evans MD

## 2020-08-14 NOTE — PROGRESS NOTES
Procedure:  Right mammotome    I recommend a Mammotome biopsy of the right breast lesion. The procedure and risks were clearly explained including bleeding, infection and requirement for re-biopsy and the patient understood these and wishes to proceed.    The patient was brought to the procedure room. Consent and time out were performed. The area was prepped and draped in the usual fashion. 1% lidocaine with epinephrine was infused locally. A small incision was made and under ultrasound guidance the lesion was biopsied with the 8G Mammotome. Clip was placed prior to removal of the Mammotome device. Minimal blood loss had occurred and hemostasis had been well controlled with pressure. Steri-strips were applied to the wound. The patient tolerated the procedure and there were no complications. We will make a f/u appointment to discuss results.      Likewise ultrasound revealed evidence of a large cystic mass in the lateral aspect of the right breast that corresponded to the area of palpable nodularity and tenderness.  A fine-needle aspiration was performed under direct ultrasound guidance after it was prepped and draped in the normal sterile fashion.  Greenish fluid was returned and the mass disappeared and the patient symptomatology from this area was resolved.  Fluid was sent for cytopathology.

## 2020-08-19 ENCOUNTER — PROCEDURE VISIT (OUTPATIENT)
Dept: SURGERY | Facility: CLINIC | Age: 40
End: 2020-08-19

## 2020-08-19 VITALS
TEMPERATURE: 98.4 F | BODY MASS INDEX: 31.99 KG/M2 | OXYGEN SATURATION: 96 % | WEIGHT: 199.08 LBS | DIASTOLIC BLOOD PRESSURE: 90 MMHG | HEART RATE: 94 BPM | HEIGHT: 66 IN | SYSTOLIC BLOOD PRESSURE: 144 MMHG

## 2020-08-19 DIAGNOSIS — N63.0 BREAST MASS: Primary | ICD-10-CM

## 2020-08-19 PROCEDURE — 10005 FNA BX W/US GDN 1ST LES: CPT | Performed by: SURGERY

## 2020-08-19 PROCEDURE — 19083 BX BREAST 1ST LESION US IMAG: CPT | Performed by: SURGERY

## 2020-08-25 ENCOUNTER — PROCEDURE VISIT (OUTPATIENT)
Dept: SURGERY | Facility: CLINIC | Age: 40
End: 2020-08-25

## 2020-08-25 VITALS — WEIGHT: 199 LBS | BODY MASS INDEX: 31.98 KG/M2 | HEIGHT: 66 IN

## 2020-08-25 DIAGNOSIS — N63.0 BREAST MASS: Primary | ICD-10-CM

## 2020-08-25 PROCEDURE — 19083 BX BREAST 1ST LESION US IMAG: CPT | Performed by: SURGERY

## 2020-08-25 NOTE — PROGRESS NOTES
Procedure: Left breast mammotome     I recommend a Mammotome biopsy of the left breast lesion. The procedure and risks were clearly explained including bleeding, infection and requirement for re-biopsy and the patient understood these and wishes to proceed.    The patient was brought to the procedure room. Consent and time out were performed. The area was prepped and draped in the usual fashion. 1% lidocaine with epinephrine was infused locally. A small incision was made and under ultrasound guidance the lesion was biopsied with the 8G Mammotome. Clip was placed prior to removal of the Mammotome device. Minimal blood loss had occurred and hemostasis had been well controlled with pressure. Steri-strips were applied to the wound. The patient tolerated the procedure and there were no complications. We will make a f/u appointment to discuss results.

## 2020-09-01 ENCOUNTER — OFFICE VISIT (OUTPATIENT)
Dept: SURGERY | Facility: CLINIC | Age: 40
End: 2020-09-01

## 2020-09-01 VITALS
OXYGEN SATURATION: 98 % | DIASTOLIC BLOOD PRESSURE: 90 MMHG | WEIGHT: 199.08 LBS | SYSTOLIC BLOOD PRESSURE: 142 MMHG | BODY MASS INDEX: 31.99 KG/M2 | HEART RATE: 118 BPM | HEIGHT: 66 IN | TEMPERATURE: 97.5 F

## 2020-09-01 DIAGNOSIS — N63.0 BREAST MASS: Primary | ICD-10-CM

## 2020-09-01 PROCEDURE — 99213 OFFICE O/P EST LOW 20 MIN: CPT | Performed by: SURGERY

## 2020-10-26 ENCOUNTER — HOSPITAL ENCOUNTER (OUTPATIENT)
Dept: MAMMOGRAPHY | Facility: HOSPITAL | Age: 40
Discharge: HOME | End: 2020-10-26

## 2020-10-26 ENCOUNTER — HOSPITAL ENCOUNTER (OUTPATIENT)
Dept: ULTRASOUND IMAGING | Facility: HOSPITAL | Age: 40
Discharge: HOME | End: 2020-10-26

## 2020-10-26 DIAGNOSIS — N63.0 BREAST MASS: Primary | ICD-10-CM

## 2020-10-26 DIAGNOSIS — Z12.31 ENCOUNTER FOR SCREENING MAMMOGRAM FOR MALIGNANT NEOPLASM OF BREAST: ICD-10-CM

## 2020-10-26 DIAGNOSIS — N63.0 BREAST MASS: ICD-10-CM

## 2020-10-26 PROCEDURE — 77066 DX MAMMO INCL CAD BI: CPT

## 2020-10-26 PROCEDURE — G0279 TOMOSYNTHESIS, MAMMO: HCPCS

## 2020-10-26 PROCEDURE — 76642 ULTRASOUND BREAST LIMITED: CPT

## 2020-11-09 ENCOUNTER — OFFICE VISIT (OUTPATIENT)
Dept: INTERNAL MEDICINE | Facility: CLINIC | Age: 40
End: 2020-11-09

## 2020-11-09 VITALS
RESPIRATION RATE: 18 BRPM | SYSTOLIC BLOOD PRESSURE: 140 MMHG | WEIGHT: 207 LBS | BODY MASS INDEX: 33.27 KG/M2 | TEMPERATURE: 97.7 F | OXYGEN SATURATION: 98 % | HEIGHT: 66 IN | DIASTOLIC BLOOD PRESSURE: 70 MMHG | HEART RATE: 98 BPM

## 2020-11-09 DIAGNOSIS — Z72.0 TOBACCO ABUSE: Primary | ICD-10-CM

## 2020-11-09 DIAGNOSIS — Z00.00 ANNUAL PHYSICAL EXAM: ICD-10-CM

## 2020-11-09 DIAGNOSIS — M54.12 CERVICAL RADICULOPATHY: ICD-10-CM

## 2020-11-09 DIAGNOSIS — Z11.59 NEED FOR HEPATITIS C SCREENING TEST: ICD-10-CM

## 2020-11-09 DIAGNOSIS — M25.462 PAIN AND SWELLING OF KNEE, LEFT: ICD-10-CM

## 2020-11-09 DIAGNOSIS — M25.362 KNEE INSTABILITY, LEFT: ICD-10-CM

## 2020-11-09 DIAGNOSIS — M25.562 PAIN AND SWELLING OF KNEE, LEFT: ICD-10-CM

## 2020-11-09 DIAGNOSIS — Z23 NEED FOR INFLUENZA VACCINATION: ICD-10-CM

## 2020-11-09 DIAGNOSIS — Z13.29 THYROID DISORDER SCREEN: ICD-10-CM

## 2020-11-09 DIAGNOSIS — E55.9 VITAMIN D DEFICIENCY: ICD-10-CM

## 2020-11-09 DIAGNOSIS — Z23 NEED FOR VACCINATION FOR PNEUMOCOCCUS: ICD-10-CM

## 2020-11-09 PROCEDURE — 99396 PREV VISIT EST AGE 40-64: CPT | Performed by: FAMILY MEDICINE

## 2020-11-09 PROCEDURE — 90472 IMMUNIZATION ADMIN EACH ADD: CPT | Performed by: FAMILY MEDICINE

## 2020-11-09 PROCEDURE — 90732 PPSV23 VACC 2 YRS+ SUBQ/IM: CPT | Performed by: FAMILY MEDICINE

## 2020-11-09 PROCEDURE — 90686 IIV4 VACC NO PRSV 0.5 ML IM: CPT | Performed by: FAMILY MEDICINE

## 2020-11-09 PROCEDURE — 90471 IMMUNIZATION ADMIN: CPT | Performed by: FAMILY MEDICINE

## 2020-11-09 NOTE — PROGRESS NOTES
"11/09/2020  Chief Complaint   Patient presents with   • Annual Exam     Physical   • Knee Pain     Lef tknee pain. She recalls back in July she got up and twisted her knee. She can not stretch it out or bend it without pain.        Heidi Franklin is here for her annual preventive exam. History per MA reviewed.     In June twisted knee and it still hurts. Minimal activity leads to more pain. Hurts all the way around. Feels \"full\" and pops anteriorly and has severe pain medial aspect. Swelling more lateral aspect. Feels unstable. Hurts to straighten fully. Has to rely on right knee to get up and has to grab onto things to get up. Admits she feels the need for a cane just hasn't broke down to get one yet. Heating pad worsens. Pain gels do not help.    Heidi Franklin has the following medical issues:  Patient Active Problem List    Diagnosis   • Chronic neck pain [M54.2, G89.29]   • Cervical radiculopathy [M54.12]   • Spinal stenosis in cervical region [M48.02]   • Cervical disc herniation [M50.20]   • Anxiety [F41.9]   • Bloodgood disease [N60.19]   • Headache, migraine [G43.909]   • Affective disorder (CMS/HCC) [F39]   • Goiter, nontoxic, multinodular [E04.2]   • Excess weight [E66.3]       Health Maintenance   Topic Date Due   • Pneumococcal Vaccine 0-64 (1 of 1 - PPSV23) 08/05/1984   • HEPATITIS C SCREENING  10/28/2016   • INFLUENZA VACCINE  08/01/2020   • ANNUAL PHYSICAL  11/10/2021   • MAMMOGRAM  10/26/2022   • PAP SMEAR  07/01/2023   • TDAP/TD VACCINES (2 - Td) 12/20/2028       Immunization History   Administered Date(s) Administered   • Flulaval/Fluarix/Fluzone Quad 11/09/2020   • Hepatitis A 12/20/2018, 10/07/2019   • Hepatitis B 11/13/2000   • Influenza, Unspecified 10/24/2018   • Pneumococcal Polysaccharide (PPSV23) 11/09/2020   • Td 06/17/2003   • Tdap 12/20/2018   • flucelvax quad pfs =>4 YRS 10/07/2019       Review of Systems   Constitutional: Positive for unexpected weight gain. Negative for fever. " "  Respiratory: Negative for shortness of breath.    Cardiovascular: Negative for chest pain.   Musculoskeletal: Positive for arthralgias, gait problem, joint swelling and neck pain.   Psychiatric/Behavioral: Positive for stress.   All other systems reviewed and are negative.      The following portions of the patient's history were reviewed and updated as appropriate: allergies, current medications, past family history, past medical history, past social history, past surgical history and problem list.    Objective   Visit Vitals  /70   Pulse 98   Temp 97.7 °F (36.5 °C) (Temporal)   Resp 18   Ht 167.6 cm (65.98\")   Wt 93.9 kg (207 lb)   SpO2 98%   BMI 33.43 kg/m²        Physical Exam  Vitals signs and nursing note reviewed.   Constitutional:       General: She is not in acute distress.     Appearance: Normal appearance. She is well-developed and well-groomed. She is obese. She is not ill-appearing, toxic-appearing or diaphoretic.      Interventions: Face mask in place.   HENT:      Head: Normocephalic and atraumatic. Hair is normal.      Right Ear: Hearing, tympanic membrane, ear canal and external ear normal.      Left Ear: Hearing, tympanic membrane, ear canal and external ear normal.   Eyes:      General: Lids are normal. Gaze aligned appropriately. No scleral icterus.        Right eye: No discharge.         Left eye: No discharge.      Extraocular Movements: Extraocular movements intact.      Conjunctiva/sclera: Conjunctivae normal.      Pupils: Pupils are equal, round, and reactive to light.   Neck:      Musculoskeletal: Neck supple.      Thyroid: No thyromegaly.      Trachea: Trachea and phonation normal. No tracheal deviation.   Cardiovascular:      Rate and Rhythm: Normal rate and regular rhythm.      Heart sounds: Normal heart sounds. No murmur. No friction rub. No gallop.    Pulmonary:      Effort: Pulmonary effort is normal.      Breath sounds: Normal breath sounds and air entry.   Chest:      Chest " wall: No tenderness.   Abdominal:      General: Bowel sounds are normal. There is no distension.      Palpations: Abdomen is soft. Abdomen is not rigid. There is no mass.      Tenderness: There is no abdominal tenderness. There is no guarding or rebound.   Musculoskeletal:         General: No deformity.      Right knee: She exhibits no swelling, no effusion and no deformity. No tenderness found.      Left knee: She exhibits swelling and effusion. She exhibits no ecchymosis and no erythema. Tenderness found. Medial joint line tenderness noted.      Right lower leg: No edema.      Left lower leg: No edema.      Comments: lachman mildly positive on left compared to right   Lymphadenopathy:      Head:      Right side of head: No submandibular adenopathy.      Left side of head: No submandibular adenopathy.   Skin:     General: Skin is warm.      Capillary Refill: Capillary refill takes less than 2 seconds.      Coloration: Skin is not cyanotic, jaundiced or pale.      Findings: No rash.      Nails: There is no clubbing.     Neurological:      General: No focal deficit present.      Mental Status: She is alert and oriented to person, place, and time.      Cranial Nerves: No cranial nerve deficit.      Motor: No tremor, atrophy, abnormal muscle tone or seizure activity.      Gait: Gait abnormal.   Psychiatric:         Attention and Perception: Attention and perception normal.         Mood and Affect: Mood and affect normal.         Speech: Speech normal.         Behavior: Behavior normal. Behavior is cooperative.         Thought Content: Thought content normal.         Cognition and Memory: Cognition and memory normal.         Judgment: Judgment normal.         Lab Results   Component Value Date    CHLPL 155 12/20/2018    TRIG 91 12/20/2018    HDL 46 12/20/2018    LDL 91 12/20/2018     Lab Results   Component Value Date    TSH 1.090 12/20/2018     Lab Results   Component Value Date    FREET4 0.82 12/20/2018     Lab  Results   Component Value Date    HGBA1C 5.30 12/20/2018       Assessment     Problem List Items Addressed This Visit        Nervous and Auditory    Cervical radiculopathy    Relevant Orders    Compliance Drug Analysis, Ur - Urine, Clean Catch      Other Visit Diagnoses     Tobacco abuse    -  Primary    Relevant Orders    Pneumococcal Polysaccharide Vaccine 23-Valent Greater Than or Equal To 3yo Subcutaneous / IM (Completed)    Annual physical exam        Relevant Orders    Hepatitis C Antibody    CBC (No Diff)    Comprehensive Metabolic Panel    Lipid Panel    Vitamin D 25 Hydroxy    Need for influenza vaccination        Relevant Orders    Fluarix Quad >6 Months (3694-0562) (Completed)    Need for vaccination for pneumococcus        Relevant Orders    Pneumococcal Polysaccharide Vaccine 23-Valent Greater Than or Equal To 3yo Subcutaneous / IM (Completed)    Knee instability, left        Relevant Orders    MRI Knee Left Without Contrast    Pain and swelling of knee, left        Relevant Orders    MRI Knee Left Without Contrast    Vitamin D deficiency        Relevant Orders    Vitamin D 25 Hydroxy    Need for hepatitis C screening test        Relevant Orders    Hepatitis C Antibody    Thyroid disorder screen        Relevant Orders    TSH+Free T4          · Health maintenance information provided with patient plan.   · Counseled on age appropriate health screenings.  · Immunizations for age discussed, encouraged.   · Encourage healthy habits such as exercise, healthy diet.  · YSABEL reviewed and appropriate.  She reports not yet needing gabapentin refill, takes as needed.  Patient's Body mass index is 33.43 kg/m². BMI is above normal parameters. Recommendations include: educational material.  · Return in about 6 months (around 5/9/2021) for Controlled Rx Follow Up.     Martine Evans MD

## 2020-11-09 NOTE — PATIENT INSTRUCTIONS
Health Maintenance, Female  Adopting a healthy lifestyle and getting preventive care can go a long way to promote health and wellness. Talk with your health care provider about what schedule of regular examinations is right for you. This is a good chance for you to check in with your provider about disease prevention and staying healthy.  In between checkups, there are plenty of things you can do on your own. Experts have done a lot of research about which lifestyle changes and preventive measures are most likely to keep you healthy. Ask your health care provider for more information.  Weight and diet  Eat a healthy diet  · Be sure to include plenty of vegetables, fruits, low-fat dairy products, and lean protein.  · Do not eat a lot of foods high in solid fats, added sugars, or salt.  · Get regular exercise. This is one of the most important things you can do for your health.  ? Most adults should exercise for at least 150 minutes each week. The exercise should increase your heart rate and make you sweat (moderate-intensity exercise).  ? Most adults should also do strengthening exercises at least twice a week. This is in addition to the moderate-intensity exercise.     Maintain a healthy weight  · Body mass index (BMI) is a measurement that can be used to identify possible weight problems. It estimates body fat based on height and weight. Your health care provider can help determine your BMI and help you achieve or maintain a healthy weight.  · For females 20 years of age and older:  ? A BMI below 18.5 is considered underweight.  ? A BMI of 18.5 to 24.9 is normal.  ? A BMI of 25 to 29.9 is considered overweight.  ? A BMI of 30 and above is considered obese.     Watch levels of cholesterol and blood lipids  · You should start having your blood tested for lipids and cholesterol at 20 years of age, then have this test every 5 years.  · You may need to have your cholesterol levels checked more often if:  ? Your lipid or  cholesterol levels are high.  ? You are older than 50 years of age.  ? You are at high risk for heart disease.     Cancer screening  Lung Cancer  · Lung cancer screening is recommended for adults 55-80 years old who are at high risk for lung cancer because of a history of smoking.  · A yearly low-dose CT scan of the lungs is recommended for people who:  ? Currently smoke.  ? Have quit within the past 15 years.  ? Have at least a 30-pack-year history of smoking. A pack year is smoking an average of one pack of cigarettes a day for 1 year.  · Yearly screening should continue until it has been 15 years since you quit.  · Yearly screening should stop if you develop a health problem that would prevent you from having lung cancer treatment.     Breast Cancer  · Practice breast self-awareness. This means understanding how your breasts normally appear and feel.  · It also means doing regular breast self-exams. Let your health care provider know about any changes, no matter how small.  · If you are in your 20s or 30s, you should have a clinical breast exam (CBE) by a health care provider every 1-3 years as part of a regular health exam.  · If you are 40 or older, have a CBE every year. Also consider having a breast X-ray (mammogram) every year.  · If you have a family history of breast cancer, talk to your health care provider about genetic screening.  · If you are at high risk for breast cancer, talk to your health care provider about having an MRI and a mammogram every year.  · Breast cancer gene (BRCA) assessment is recommended for women who have family members with BRCA-related cancers. BRCA-related cancers include:  ? Breast.  ? Ovarian.  ? Tubal.  ? Peritoneal cancers.  · Results of the assessment will determine the need for genetic counseling and BRCA1 and BRCA2 testing.     Cervical Cancer  Your health care provider may recommend that you be screened regularly for cancer of the pelvic organs (ovaries, uterus, and  vagina). This screening involves a pelvic examination, including checking for microscopic changes to the surface of your cervix (Pap test). You may be encouraged to have this screening done every 3 years, beginning at age 21.  · For women ages 30-65, health care providers may recommend pelvic exams and Pap testing every 3 years, or they may recommend the Pap and pelvic exam, combined with testing for human papilloma virus (HPV), every 5 years. Some types of HPV increase your risk of cervical cancer. Testing for HPV may also be done on women of any age with unclear Pap test results.  · Other health care providers may not recommend any screening for nonpregnant women who are considered low risk for pelvic cancer and who do not have symptoms. Ask your health care provider if a screening pelvic exam is right for you.  · If you have had past treatment for cervical cancer or a condition that could lead to cancer, you need Pap tests and screening for cancer for at least 20 years after your treatment. If Pap tests have been discontinued, your risk factors (such as having a new sexual partner) need to be reassessed to determine if screening should resume. Some women have medical problems that increase the chance of getting cervical cancer. In these cases, your health care provider may recommend more frequent screening and Pap tests.     Colorectal Cancer  · This type of cancer can be detected and often prevented.  · Routine colorectal cancer screening usually begins at 50 years of age and continues through 75 years of age.  · Your health care provider may recommend screening at an earlier age if you have risk factors for colon cancer.  · Your health care provider may also recommend using home test kits to check for hidden blood in the stool.  · A small camera at the end of a tube can be used to examine your colon directly (sigmoidoscopy or colonoscopy). This is done to check for the earliest forms of colorectal  cancer.  · Routine screening usually begins at age 50.  · Direct examination of the colon should be repeated every 5-10 years through 75 years of age. However, you may need to be screened more often if early forms of precancerous polyps or small growths are found.     Skin Cancer  · Check your skin from head to toe regularly.  · Tell your health care provider about any new moles or changes in moles, especially if there is a change in a mole's shape or color.  · Also tell your health care provider if you have a mole that is larger than the size of a pencil eraser.  · Always use sunscreen. Apply sunscreen liberally and repeatedly throughout the day.  · Protect yourself by wearing long sleeves, pants, a wide-brimmed hat, and sunglasses whenever you are outside.     Heart disease, diabetes, and high blood pressure  · High blood pressure causes heart disease and increases the risk of stroke. High blood pressure is more likely to develop in:  ? People who have blood pressure in the high end of the normal range (130-139/85-89 mm Hg).  ? People who are overweight or obese.  ? People who are .  · If you are 18-39 years of age, have your blood pressure checked every 3-5 years. If you are 40 years of age or older, have your blood pressure checked every year. You should have your blood pressure measured twice--once when you are at a hospital or clinic, and once when you are not at a hospital or clinic. Record the average of the two measurements. To check your blood pressure when you are not at a hospital or clinic, you can use:  ? An automated blood pressure machine at a pharmacy.  ? A home blood pressure monitor.  · If you are between 55 years and 79 years old, ask your health care provider if you should take aspirin to prevent strokes.  · Have regular diabetes screenings. This involves taking a blood sample to check your fasting blood sugar level.  ? If you are at a normal weight and have a low risk for  diabetes, have this test once every three years after 45 years of age.  ? If you are overweight and have a high risk for diabetes, consider being tested at a younger age or more often.  Preventing infection  Hepatitis B  · If you have a higher risk for hepatitis B, you should be screened for this virus. You are considered at high risk for hepatitis B if:  ? You were born in a country where hepatitis B is common. Ask your health care provider which countries are considered high risk.  ? Your parents were born in a high-risk country, and you have not been immunized against hepatitis B (hepatitis B vaccine).  ? You have HIV or AIDS.  ? You use needles to inject street drugs.  ? You live with someone who has hepatitis B.  ? You have had sex with someone who has hepatitis B.  ? You get hemodialysis treatment.  ? You take certain medicines for conditions, including cancer, organ transplantation, and autoimmune conditions.     Hepatitis C  · Blood testing is recommended for:  ? Everyone born from 1945 through 1965.  ? Anyone with known risk factors for hepatitis C.     Sexually transmitted infections (STIs)  · You should be screened for sexually transmitted infections (STIs) including gonorrhea and chlamydia if:  ? You are sexually active and are younger than 24 years of age.  ? You are older than 24 years of age and your health care provider tells you that you are at risk for this type of infection.  ? Your sexual activity has changed since you were last screened and you are at an increased risk for chlamydia or gonorrhea. Ask your health care provider if you are at risk.  · If you do not have HIV, but are at risk, it may be recommended that you take a prescription medicine daily to prevent HIV infection. This is called pre-exposure prophylaxis (PrEP). You are considered at risk if:  ? You are sexually active and do not regularly use condoms or know the HIV status of your partner(s).  ? You take drugs by injection.  ? You  are sexually active with a partner who has HIV.     Talk with your health care provider about whether you are at high risk of being infected with HIV. If you choose to begin PrEP, you should first be tested for HIV. You should then be tested every 3 months for as long as you are taking PrEP.  Pregnancy  · If you are premenopausal and you may become pregnant, ask your health care provider about preconception counseling.  · If you may become pregnant, take 400 to 800 micrograms (mcg) of folic acid every day.  · If you want to prevent pregnancy, talk to your health care provider about birth control (contraception).  Osteoporosis and menopause  · Osteoporosis is a disease in which the bones lose minerals and strength with aging. This can result in serious bone fractures. Your risk for osteoporosis can be identified using a bone density scan.  · If you are 65 years of age or older, or if you are at risk for osteoporosis and fractures, ask your health care provider if you should be screened.  · Ask your health care provider whether you should take a calcium or vitamin D supplement to lower your risk for osteoporosis.  · Menopause may have certain physical symptoms and risks.  · Hormone replacement therapy may reduce some of these symptoms and risks.  Talk to your health care provider about whether hormone replacement therapy is right for you.  Follow these instructions at home:  · Schedule regular health, dental, and eye exams.  · Stay current with your immunizations.  · Do not use any tobacco products including cigarettes, chewing tobacco, or electronic cigarettes.  · If you are pregnant, do not drink alcohol.  · If you are breastfeeding, limit how much and how often you drink alcohol.  · Limit alcohol intake to no more than 1 drink per day for nonpregnant women. One drink equals 12 ounces of beer, 5 ounces of wine, or 1½ ounces of hard liquor.  · Do not use street drugs.  · Do not share needles.  · Ask your health care  provider for help if you need support or information about quitting drugs.  · Tell your health care provider if you often feel depressed.  · Tell your health care provider if you have ever been abused or do not feel safe at home.  This information is not intended to replace advice given to you by your health care provider. Make sure you discuss any questions you have with your health care provider.  Document Released: 07/02/2012 Document Revised: 05/25/2017 Document Reviewed: 09/20/2016  [employer] Interactive Patient Education © 2018 [employer]

## 2020-11-10 LAB
25(OH)D3+25(OH)D2 SERPL-MCNC: 13.4 NG/ML (ref 30–100)
ALBUMIN SERPL-MCNC: 4.2 G/DL (ref 3.5–5.2)
ALBUMIN/GLOB SERPL: 1.8 G/DL
ALP SERPL-CCNC: 110 U/L (ref 39–117)
ALT SERPL-CCNC: 14 U/L (ref 1–33)
AST SERPL-CCNC: 13 U/L (ref 1–32)
BILIRUB SERPL-MCNC: 0.2 MG/DL (ref 0–1.2)
BUN SERPL-MCNC: 12 MG/DL (ref 6–20)
BUN/CREAT SERPL: 17.9 (ref 7–25)
CALCIUM SERPL-MCNC: 9.2 MG/DL (ref 8.6–10.5)
CHLORIDE SERPL-SCNC: 101 MMOL/L (ref 98–107)
CHOLEST SERPL-MCNC: 185 MG/DL (ref 0–200)
CO2 SERPL-SCNC: 28.8 MMOL/L (ref 22–29)
CREAT SERPL-MCNC: 0.67 MG/DL (ref 0.57–1)
ERYTHROCYTE [DISTWIDTH] IN BLOOD BY AUTOMATED COUNT: 12.6 % (ref 12.3–15.4)
GLOBULIN SER CALC-MCNC: 2.4 GM/DL
GLUCOSE SERPL-MCNC: 91 MG/DL (ref 65–99)
HCT VFR BLD AUTO: 42.3 % (ref 34–46.6)
HCV AB S/CO SERPL IA: <0.1 S/CO RATIO (ref 0–0.9)
HDLC SERPL-MCNC: 39 MG/DL (ref 40–60)
HGB BLD-MCNC: 14.3 G/DL (ref 12–15.9)
LDLC SERPL CALC-MCNC: 120 MG/DL (ref 0–100)
MCH RBC QN AUTO: 31.8 PG (ref 26.6–33)
MCHC RBC AUTO-ENTMCNC: 33.8 G/DL (ref 31.5–35.7)
MCV RBC AUTO: 94.2 FL (ref 79–97)
PLATELET # BLD AUTO: 249 10*3/MM3 (ref 140–450)
POTASSIUM SERPL-SCNC: 5 MMOL/L (ref 3.5–5.2)
PROT SERPL-MCNC: 6.6 G/DL (ref 6–8.5)
RBC # BLD AUTO: 4.49 10*6/MM3 (ref 3.77–5.28)
SODIUM SERPL-SCNC: 137 MMOL/L (ref 136–145)
T4 FREE SERPL-MCNC: 0.75 NG/DL (ref 0.93–1.7)
TRIGL SERPL-MCNC: 144 MG/DL (ref 0–150)
TSH SERPL DL<=0.005 MIU/L-ACNC: 0.88 UIU/ML (ref 0.27–4.2)
VLDLC SERPL CALC-MCNC: 26 MG/DL (ref 5–40)
WBC # BLD AUTO: 8.52 10*3/MM3 (ref 3.4–10.8)

## 2020-11-11 RX ORDER — CHOLECALCIFEROL (VITAMIN D3) 1250 MCG
50000 CAPSULE ORAL
Qty: 12 CAPSULE | Refills: 1 | Status: SHIPPED | OUTPATIENT
Start: 2020-11-11 | End: 2021-05-17 | Stop reason: SDUPTHER

## 2020-11-12 LAB — DRUGS UR: NORMAL

## 2020-11-18 ENCOUNTER — HOSPITAL ENCOUNTER (OUTPATIENT)
Dept: MRI IMAGING | Facility: HOSPITAL | Age: 40
Discharge: HOME | End: 2020-11-18
Admitting: FAMILY MEDICINE

## 2020-11-18 PROCEDURE — 73721 MRI JNT OF LWR EXTRE W/O DYE: CPT

## 2020-11-19 ENCOUNTER — PATIENT MESSAGE (OUTPATIENT)
Dept: INTERNAL MEDICINE | Facility: CLINIC | Age: 40
End: 2020-11-19

## 2020-11-19 DIAGNOSIS — M25.562 PAIN AND SWELLING OF KNEE, LEFT: Primary | ICD-10-CM

## 2020-11-19 DIAGNOSIS — M25.462 PAIN AND SWELLING OF KNEE, LEFT: Primary | ICD-10-CM

## 2020-11-19 DIAGNOSIS — M71.20 SYNOVIAL CYST OF POPLITEAL SPACE, UNSPECIFIED LATERALITY: ICD-10-CM

## 2020-11-21 NOTE — TELEPHONE ENCOUNTER
From: Martine Evans MD  To: Heidi Franklin  Sent: 11/19/2020 8:13 AM EST  Subject: mri    Good news is that there is not a tear. You do have fluid in the knee (effusion) and a baker's cyst, which can definitely cause some discomfort. Would you like to see orthopedics to discuss? If so, do you have any provider in particular you want to see?    Dr. Evans

## 2020-12-02 ENCOUNTER — LAB (OUTPATIENT)
Dept: LAB | Facility: HOSPITAL | Age: 40
End: 2020-12-02

## 2020-12-02 ENCOUNTER — OFFICE VISIT (OUTPATIENT)
Dept: ORTHOPEDIC SURGERY | Facility: CLINIC | Age: 40
End: 2020-12-02

## 2020-12-02 VITALS — WEIGHT: 207 LBS | BODY MASS INDEX: 33.27 KG/M2 | RESPIRATION RATE: 18 BRPM | HEIGHT: 66 IN

## 2020-12-02 DIAGNOSIS — M71.22 BAKER'S CYST OF KNEE, LEFT: ICD-10-CM

## 2020-12-02 DIAGNOSIS — M22.2X2 PATELLOFEMORAL DISORDER OF BOTH KNEES: ICD-10-CM

## 2020-12-02 DIAGNOSIS — M22.2X1 PATELLOFEMORAL DISORDER OF BOTH KNEES: ICD-10-CM

## 2020-12-02 DIAGNOSIS — M25.562 ARTHRALGIA OF LEFT KNEE: Primary | ICD-10-CM

## 2020-12-02 LAB
CHROMATIN AB SERPL-ACNC: <10 IU/ML (ref 0–14)
CRP SERPL-MCNC: 1.67 MG/DL (ref 0–0.5)
ERYTHROCYTE [SEDIMENTATION RATE] IN BLOOD: 16 MM/HR (ref 0–20)
URATE SERPL-MCNC: 4.5 MG/DL (ref 2.4–5.7)

## 2020-12-02 PROCEDURE — 36415 COLL VENOUS BLD VENIPUNCTURE: CPT | Performed by: PHYSICIAN ASSISTANT

## 2020-12-02 PROCEDURE — 86140 C-REACTIVE PROTEIN: CPT | Performed by: PHYSICIAN ASSISTANT

## 2020-12-02 PROCEDURE — 85652 RBC SED RATE AUTOMATED: CPT | Performed by: PHYSICIAN ASSISTANT

## 2020-12-02 PROCEDURE — 86200 CCP ANTIBODY: CPT | Performed by: PHYSICIAN ASSISTANT

## 2020-12-02 PROCEDURE — 20610 DRAIN/INJ JOINT/BURSA W/O US: CPT | Performed by: PHYSICIAN ASSISTANT

## 2020-12-02 PROCEDURE — 99213 OFFICE O/P EST LOW 20 MIN: CPT | Performed by: PHYSICIAN ASSISTANT

## 2020-12-02 PROCEDURE — 86431 RHEUMATOID FACTOR QUANT: CPT | Performed by: PHYSICIAN ASSISTANT

## 2020-12-02 PROCEDURE — 84550 ASSAY OF BLOOD/URIC ACID: CPT | Performed by: PHYSICIAN ASSISTANT

## 2020-12-02 RX ORDER — METHYLPREDNISOLONE ACETATE 40 MG/ML
40 INJECTION, SUSPENSION INTRA-ARTICULAR; INTRALESIONAL; INTRAMUSCULAR; SOFT TISSUE
Status: COMPLETED | OUTPATIENT
Start: 2020-12-02 | End: 2020-12-02

## 2020-12-02 RX ORDER — LIDOCAINE HYDROCHLORIDE 10 MG/ML
2 INJECTION, SOLUTION INFILTRATION; PERINEURAL
Status: COMPLETED | OUTPATIENT
Start: 2020-12-02 | End: 2020-12-02

## 2020-12-02 RX ADMIN — LIDOCAINE HYDROCHLORIDE 2 ML: 10 INJECTION, SOLUTION INFILTRATION; PERINEURAL at 10:43

## 2020-12-02 RX ADMIN — METHYLPREDNISOLONE ACETATE 40 MG: 40 INJECTION, SUSPENSION INTRA-ARTICULAR; INTRALESIONAL; INTRAMUSCULAR; SOFT TISSUE at 10:43

## 2020-12-02 NOTE — PROGRESS NOTES
"Subjective   Patient ID: Heidi Franklin is a 40 y.o. right hand dominant female  Pain of the Left Knee (Patient is here today for left knee pain, she states in the spring it started popping and in  she was getting off the floor when something happened. She states she has had an MRI already.)         History of Present Illness    Patient presents as a new patient with complaints of left knee pain and swelling that has been ongoing since spring 2020.  She states since 2020 she was getting off of the floor and felt something pop in her left knee.  Since she has had intermittent pain and swelling.  At times the left knee \"goes out\".  Her PCP did order MRI of the left knee which she brings with her to review.  Patient has been taking oral analgesics without significant improvement.  She states she has never been checked for rheumatoid arthritis although this does run in her family history.    Past Medical History:   Diagnosis Date   • Anxiety    • Arthritis    • Carpal tunnel syndrome     right   • Cervical disc herniation 2016   • Encounter for insertion of mirena IUD 2016   • Encounter for insertion of mirena IUD 2016   • Headache    • History of Papanicolaou smear of cervix 10/20/2015    WNL   • History of Papanicolaou smear of cervix 2016    WNL   • Low back pain    • Scoliosis         Past Surgical History:   Procedure Laterality Date   • BREAST BIOPSY     • BREAST LUMPECTOMY      right   • BREAST SURGERY Right    •  SECTION     • INTERVENTIONAL RADIOLOGY PROCEDURE N/A 2017    Procedure:  myelogram cervical spine;  Surgeon: Moody Fitch MD;  Location: [facility] INVASIVE LOCATION;  Service:    • OTHER SURGICAL HISTORY      Breast Mammatome, Needle Biopsy, ACDF    • SPINE SURGERY  2017, 2018    ACDF C5-6 and C6-7, ACDF C4-5   • SPINE SURGERY  2019    3rd attempt   • US GUIDED CYST ASPIRATION BREAST N/A 2020   • US GUIDED CYST ASPIRATION " BREAST N/A 2020       Family History   Problem Relation Age of Onset   • COPD Other    • Arthritis Maternal Aunt         Spine   • Breast cancer Maternal Aunt    • Arthritis Maternal Aunt         Spine   • Drug abuse Maternal Aunt             • Breast cancer Maternal Aunt    • Arthritis Maternal Grandmother         Spine   • Cancer Mother         Throat Cancer   • COPD Mother                 Social History     Socioeconomic History   • Marital status:      Spouse name: Not on file   • Number of children: Not on file   • Years of education: Not on file   • Highest education level: Not on file   Tobacco Use   • Smoking status: Current Every Day Smoker     Packs/day: 1.00     Years: 15.00     Pack years: 15.00   • Smokeless tobacco: Never Used   Substance and Sexual Activity   • Alcohol use: No   • Drug use: No   • Sexual activity: Yes     Partners: Male     Birth control/protection: I.U.D.     Comment: Mirena         Current Outpatient Medications:   •  acetaminophen (TYLENOL) 500 MG tablet, Take 500 mg by mouth Every 6 (Six) Hours As Needed for mild pain (1-3)., Disp: , Rfl:   •  Cholecalciferol (Vitamin D3) 1.25 MG (11489 UT) capsule, Take 1 capsule by mouth Every 7 (Seven) Days. Indications: Vitamin D Deficiency, Disp: 12 capsule, Rfl: 1  •  cyclobenzaprine (FLEXERIL) 5 MG tablet, take 1 tablet by mouth three times a day if needed, Disp: 90 tablet, Rfl: 5  •  diazePAM (VALIUM) 5 MG tablet, Take 1 tablet by mouth 3 (Three) Times a Day As Needed for Muscle Spasms., Disp: 90 tablet, Rfl: 1  •  DULoxetine (Cymbalta) 60 MG capsule, Take 1 capsule by mouth Daily., Disp: 90 capsule, Rfl: 3  •  gabapentin (NEURONTIN) 300 MG capsule, Take 1 capsule by mouth 3 (Three) Times a Day., Disp: 270 capsule, Rfl: 1  •  HYDROcodone-acetaminophen (NORCO)  MG per tablet, Take 1 tablet by mouth Every 8 (Eight) Hours As Needed for Moderate Pain  or Severe Pain ., Disp: , Rfl:   •  Levonorgestrel  "(MIRENA, 52 MG, IU), by Intrauterine route., Disp: , Rfl:   •  Rimegepant Sulfate (rimegepant) 75 MG tablet dispersible, Take 75 mg by mouth Daily As Needed (migraine)., Disp: 8 tablet, Rfl: 1  •  SUMAtriptan (IMITREX) 100 MG tablet, Take 1 tablet by mouth 1 (One) Time As Needed for Migraine for up to 1 dose., Disp: 27 tablet, Rfl: 3    Allergies   Allergen Reactions   • Citalopram Other (See Comments)       Review of Systems   Constitutional: Negative for fever.   HENT: Negative for dental problem and voice change.    Eyes: Negative for visual disturbance.   Respiratory: Negative for shortness of breath.    Cardiovascular: Negative for chest pain.   Gastrointestinal: Negative for abdominal pain.   Genitourinary: Negative for dysuria.   Musculoskeletal: Positive for arthralgias. Negative for gait problem and joint swelling.   Skin: Negative for rash.   Neurological: Negative for speech difficulty.   Hematological: Does not bruise/bleed easily.   Psychiatric/Behavioral: Negative for confusion.       I have reviewed the medical and surgical history, family history, social history, medications, and/or allergies, and the review of systems of this report.    Objective   Resp 18   Ht 167.6 cm (65.99\")   Wt 93.9 kg (207 lb)   BMI 33.42 kg/m²    Physical Exam  Vitals signs and nursing note reviewed.   Constitutional:       Appearance: Normal appearance.   Pulmonary:      Effort: Pulmonary effort is normal. No respiratory distress.   Musculoskeletal:      Left knee: She exhibits swelling and abnormal patellar mobility. She exhibits no effusion, no ecchymosis, no erythema, normal alignment, no LCL laxity and no MCL laxity. Tenderness found. Medial joint line tenderness noted. No MCL and no LCL tenderness noted.   Skin:     Capillary Refill: Capillary refill takes less than 2 seconds.   Neurological:      General: No focal deficit present.      Mental Status: She is alert and oriented to person, place, and time. "   Psychiatric:         Mood and Affect: Mood normal.       Left Knee Exam     Range of Motion   Extension: 5   Flexion: 110     Tests   Drawer:  Anterior - negative         Other   Erythema: absent  Sensation: normal  Effusion: no effusion present           Extremity DVT signs are negative on physical exam with negative Abril sign, no calf pain, no palpable cords and no skin tone change   Neurologic Exam     Mental Status   Oriented to person, place, and time.      Positive Jayy sign to the left knee.    The left knee extensor mechanism is intact    Assessment/Plan   Independent Review of Radiographic Studies:    X-ray of the left knee weightbearing 2 view performed in the office for the evaluation of knee pain.  No comparison films available to review.  There does appear to be medial joint space narrowing without acute fracture        Large Joint Arthrocentesis: L knee  Date/Time: 12/2/2020 10:43 AM  Consent given by: patient  Site marked: site marked  Timeout: Immediately prior to procedure a time out was called to verify the correct patient, procedure, equipment, support staff and site/side marked as required   Supporting Documentation  Indications: pain   Procedure Details  Location: knee - L knee  Preparation: Patient was prepped and draped in the usual sterile fashion  Needle size: 22 G  Approach: anterolateral  Medications administered: 2 mL lidocaine 1 %; 40 mg methylPREDNISolone acetate 40 MG/ML  Patient tolerance: patient tolerated the procedure well with no immediate complications             Diagnoses and all orders for this visit:    1. Arthralgia of left knee (Primary)  -     XR Knee 1 or 2 View Left; Future  -     Ambulatory Referral to Physical Therapy Evaluate and treat, Ortho; Heat  -     Rheumatoid Factor  -     Sedimentation Rate  -     Uric Acid  -     Cyclic Citrul Peptide Antibody, IgG / IgA  -     C-reactive Protein    2. Baker's cyst of knee, left  -     Ambulatory Referral to Physical  Therapy Evaluate and treat, Ortho; Heat  -     Rheumatoid Factor  -     Sedimentation Rate  -     Uric Acid  -     Cyclic Citrul Peptide Antibody, IgG / IgA  -     C-reactive Protein  -     Large Joint Arthrocentesis: L knee    3. Patellofemoral disorder of both knees  -     Ambulatory Referral to Physical Therapy Evaluate and treat, Ortho; Heat  -     Rheumatoid Factor  -     Sedimentation Rate  -     Uric Acid  -     Cyclic Citrul Peptide Antibody, IgG / IgA  -     C-reactive Protein  -     Large Joint Arthrocentesis: L knee       Orthopedic activities reviewed and patient expressed appreciation  Discussion of orthopedic goals  Risk, benefits, and merits of treatment alternatives reviewed with the patient and questions answered    Recommendations/Plan:  Patient is encouraged to call or return for any issues or concerns.    Follow up in 3 months  Will call patient with lab results once they have returned.   I discussed with the patient the MRI was not always 100% sensitive to picking up medial meniscal tears.  As a last resort if she has tried a cortisone injection in 8 weeks of physical therapy if she is still experiencing any unusual abnormal symptoms we could perform a diagnostic knee arthroscopy  Patient agreeable to call or return sooner for any concerns.               EMR Dragon-transcription disclaimer:  This encounter note is an electronic transcription of spoken language to printed text.  Electronic transcription of spoken language may permit erroneous or at times nonsensical words or phrases to be inadvertently transcribed.  Although I have reviewed the note for such errors, some may still exist

## 2020-12-04 LAB — CCP IGA+IGG SERPL IA-ACNC: 8 UNITS (ref 0–19)

## 2020-12-29 ENCOUNTER — TRANSCRIBE ORDERS (OUTPATIENT)
Dept: ADMINISTRATIVE | Facility: HOSPITAL | Age: 40
End: 2020-12-29

## 2020-12-29 DIAGNOSIS — R92.8 ABNORMAL MAMMOGRAM: Primary | ICD-10-CM

## 2021-01-25 NOTE — PROGRESS NOTES
Patient: Heidi Franklin    YOB: 1980    Date: 01/26/2021    Primary Care Provider: Martine Evans MD    Chief Complaint   Patient presents with   • Abnormal Breast Imaging       SUBJECTIVE:    History of present illness:  Patient has a history of left breast mass, she is s/p left breast mammotome which was done 08/25/2020.  Pathology report showed stromal fibrosis and focal fibro adenomatoid change.  Patient is here for follow-up mammogram which was done 10/26/2020, it was read at BI-RADS Category 3. She complains of a palpable mass in her left breast when laying down. She complains of associated soreness. She denies skin discoloration and nipple drainage. She states her maternal aunt did have breast cancer.     This does cause her some dull discomfort in both breasts.  Worse with pressure, nonradiating.    The following portions of the patient's history were reviewed and updated as appropriate: allergies, current medications, past family history, past medical history, past social history, past surgical history and problem list.      Review of Systems   Constitutional: Negative for chills, fever and unexpected weight change.   HENT: Negative for hearing loss, trouble swallowing and voice change.    Eyes: Negative for visual disturbance.   Respiratory: Negative for apnea, cough, chest tightness, shortness of breath and wheezing.    Cardiovascular: Negative for chest pain, palpitations and leg swelling.   Gastrointestinal: Negative for abdominal distention, abdominal pain, anal bleeding, blood in stool, constipation, diarrhea, nausea, rectal pain and vomiting.   Endocrine: Negative for cold intolerance and heat intolerance.   Genitourinary: Negative for difficulty urinating, dysuria and flank pain.   Musculoskeletal: Negative for back pain and gait problem.   Skin: Negative for color change, rash and wound.   Neurological: Negative for dizziness, syncope, speech difficulty, weakness,  light-headedness, numbness and headaches.   Hematological: Negative for adenopathy. Does not bruise/bleed easily.   Psychiatric/Behavioral: Negative for confusion. The patient is not nervous/anxious.        Allergies:  Allergies   Allergen Reactions   • Citalopram Other (See Comments)       Medications:    Current Outpatient Medications:   •  acetaminophen (TYLENOL) 500 MG tablet, Take 500 mg by mouth Every 6 (Six) Hours As Needed for mild pain (1-3)., Disp: , Rfl:   •  Cholecalciferol (Vitamin D3) 1.25 MG (34562 UT) capsule, Take 1 capsule by mouth Every 7 (Seven) Days. Indications: Vitamin D Deficiency, Disp: 12 capsule, Rfl: 1  •  cyclobenzaprine (FLEXERIL) 5 MG tablet, take 1 tablet by mouth three times a day if needed, Disp: 90 tablet, Rfl: 5  •  diazePAM (VALIUM) 5 MG tablet, Take 1 tablet by mouth 3 (Three) Times a Day As Needed for Muscle Spasms., Disp: 90 tablet, Rfl: 1  •  DULoxetine (Cymbalta) 60 MG capsule, Take 1 capsule by mouth Daily., Disp: 90 capsule, Rfl: 3  •  gabapentin (NEURONTIN) 300 MG capsule, Take 1 capsule by mouth 3 (Three) Times a Day., Disp: 270 capsule, Rfl: 1  •  HYDROcodone-acetaminophen (NORCO)  MG per tablet, Take 1 tablet by mouth Every 8 (Eight) Hours As Needed for Moderate Pain  or Severe Pain ., Disp: , Rfl:   •  Levonorgestrel (MIRENA, 52 MG, IU), by Intrauterine route., Disp: , Rfl:   •  Rimegepant Sulfate (rimegepant) 75 MG tablet dispersible, Take 75 mg by mouth Daily As Needed (migraine)., Disp: 8 tablet, Rfl: 1  •  SUMAtriptan (IMITREX) 100 MG tablet, Take 1 tablet by mouth 1 (One) Time As Needed for Migraine for up to 1 dose., Disp: 27 tablet, Rfl: 3    History:  Past Medical History:   Diagnosis Date   • Anxiety    • Arthritis    • Carpal tunnel syndrome     right   • Cervical disc herniation 12/6/2016   • Encounter for insertion of mirena IUD 05/13/2016   • Encounter for insertion of mirena IUD 05/13/2016   • Headache    • History of Papanicolaou smear of cervix  "10/20/2015    WNL   • History of Papanicolaou smear of cervix 2016    WNL   • Low back pain    • Scoliosis        Past Surgical History:   Procedure Laterality Date   • BREAST BIOPSY     • BREAST LUMPECTOMY      right   • BREAST SURGERY Right    •  SECTION     • INTERVENTIONAL RADIOLOGY PROCEDURE N/A 2017    Procedure:  myelogram cervical spine;  Surgeon: Moody Fitch MD;  Location: [facility] INVASIVE LOCATION;  Service:    • OTHER SURGICAL HISTORY      Breast Mammatome, Needle Biopsy, ACDF    • SPINE SURGERY  2017, 2018    ACDF C5-6 and C6-7, ACDF C4-5   • SPINE SURGERY  2019    3rd attempt   • US GUIDED CYST ASPIRATION BREAST N/A 2020   • US GUIDED CYST ASPIRATION BREAST N/A 2020       Family History   Problem Relation Age of Onset   • COPD Other    • Arthritis Maternal Aunt         Spine   • Breast cancer Maternal Aunt    • Arthritis Maternal Aunt         Spine   • Drug abuse Maternal Aunt             • Breast cancer Maternal Aunt    • Arthritis Maternal Grandmother         Spine   • Cancer Mother         Throat Cancer   • COPD Mother                 Social History     Tobacco Use   • Smoking status: Current Every Day Smoker     Packs/day: 1.00     Years: 15.00     Pack years: 15.00   • Smokeless tobacco: Never Used   Substance Use Topics   • Alcohol use: No   • Drug use: No        OBJECTIVE:    Vital Signs:   Vitals:    21 1343   BP: 148/88   Pulse: 69   Temp: 97.3 °F (36.3 °C)   SpO2: 99%   Weight: 94.3 kg (208 lb)   Height: 167.6 cm (65.98\")       Physical Exam:   General Appearance:    Alert, cooperative, in no acute distress   Head:    Normocephalic, without obvious abnormality, atraumatic   Eyes:            Lids and lashes normal, conjunctivae and sclerae normal, no   icterus, no pallor, corneas clear, PERRLA   Ears:    Ears appear intact with no abnormalities noted   Throat:   No oral lesions, no thrush, oral mucosa " moist   Neck:   No adenopathy, supple, trachea midline, no thyromegaly, no   carotid bruit, no JVD   Lungs:     Clear to auscultation,respirations regular, even and                  unlabored    Heart:    Regular rhythm and normal rate, normal S1 and S2, no            murmur, no gallop, no rub, no click   Chest Wall:    No abnormalities observed   Abdomen:     Normal bowel sounds, no masses, no organomegaly, soft        non-tender, non-distended, no guarding, no rebound                tenderness   Extremities:   Moves all extremities well, no edema, no cyanosis, no             redness   Pulses:   Pulses palpable and equal bilaterally   Skin:   No bleeding, bruising or rash   Lymph nodes:   No palpable adenopathy   Neurologic:   Cranial nerves 2 - 12 grossly intact, sensation intact, DTR       present and equal bilaterally     Results Review:   I reviewed the patient's new clinical results.  I reviewed the patient's new imaging results and agree with the interpretation.  I reviewed the patient's other test results and agree with the interpretation    Review of Systems was reviewed and confirmed as accurate as documented by the MA.    ASSESSMENT/PLAN:    1. Breast mass        I did have a detailed and extensive discussion with the patient in the office today I do not think she needs any surgical intervention and I want to see her back in the office after her next scheduled bilateral mammogram, w she knows to see me sooner for any problems.     I discussed the patients findings and my recommendations with patient        Electronically signed by Rizwan Perera MD  01/27/21

## 2021-01-26 ENCOUNTER — OFFICE VISIT (OUTPATIENT)
Dept: SURGERY | Facility: CLINIC | Age: 41
End: 2021-01-26

## 2021-01-26 VITALS
OXYGEN SATURATION: 99 % | HEIGHT: 66 IN | SYSTOLIC BLOOD PRESSURE: 148 MMHG | BODY MASS INDEX: 33.43 KG/M2 | TEMPERATURE: 97.3 F | HEART RATE: 69 BPM | DIASTOLIC BLOOD PRESSURE: 88 MMHG | WEIGHT: 208 LBS

## 2021-01-26 DIAGNOSIS — N63.0 BREAST MASS: Primary | ICD-10-CM

## 2021-01-26 PROCEDURE — 99213 OFFICE O/P EST LOW 20 MIN: CPT | Performed by: SURGERY

## 2021-01-29 NOTE — TELEPHONE ENCOUNTER
----- Message from Bessie Burnett sent at 1/29/2021 10:08 AM EST -----  Regarding: MIRENA R&R  Contact: PT  THIS IS DR QUINTANA'S PT.  SHE IS DUE FOR MIRENA CHANGE OUT IN MAY.  LAST R&R WAS 5/13/16.  PAP ISN'T DUE UNTIL July.  CAN WE GO AHEAD AND E-PRESCRIBE MIRENA TO  PHARMACY?  THANKS     done

## 2021-02-09 ENCOUNTER — OFFICE VISIT (OUTPATIENT)
Dept: OBSTETRICS AND GYNECOLOGY | Facility: CLINIC | Age: 41
End: 2021-02-09

## 2021-02-09 VITALS
BODY MASS INDEX: 34.66 KG/M2 | SYSTOLIC BLOOD PRESSURE: 144 MMHG | DIASTOLIC BLOOD PRESSURE: 100 MMHG | HEIGHT: 65 IN | WEIGHT: 208 LBS

## 2021-02-09 DIAGNOSIS — Z30.433 ENCOUNTER FOR IUD REMOVAL AND REINSERTION: Primary | ICD-10-CM

## 2021-02-09 PROCEDURE — 58300 INSERT INTRAUTERINE DEVICE: CPT | Performed by: OBSTETRICS & GYNECOLOGY

## 2021-02-09 PROCEDURE — 58301 REMOVE INTRAUTERINE DEVICE: CPT | Performed by: OBSTETRICS & GYNECOLOGY

## 2021-02-09 NOTE — PROGRESS NOTES
IUD Removal and Immediate Reinsertion    No LMP recorded. Patient has had an implant.    Date of procedure:  2/9/2021    Risks and benefits discussed? yes  All questions answered? yes  Consents given by the patient  Written consent obtained? yes  Reason for removal: Device expiration    Local anesthesia used:  no    Procedure documentation:    A speculum was placed in order to view the cervix.  A tenaculum did not need to be placed on the anterior cervical lip.  Cervical dilation did not need to be performed in order to access the string.  The IUD string was easily seen.  The string was grasped and the IUD was removed without difficulty.  The IUD did not appear to be adherent to the uterine cavity. It was removed intact.    A sterile speculum was replaced and the cervix was cleansed with an antiseptic solution.  Vaginal discharge was scant.  The anterior lip of the cervix was grasped with a tenaculum and the uterine cavity was gently sounded.  There was mild difficulty passing the sound through the cervix.  Cervical dilation did not need to be performed prior to placing the IUD.  The uterus was anteverted and sounded to 7 cms.  The Mirena was then prepared per the manufacturers instructions.    The Mirena was advanced to a point 2 cms from the fundus and then the arms were released from the sheath.  The device was advanced to the fundus and the device was released fully from the sheath.. The string was cut 2 cms in length.  Bleeding from the cervix was scant.    She tolerated the procedure without any difficulty.     Post procedure instructions: It was reviewed that the Mirena will not alter the timing of when she bleeds but it may decrease the quantity of flow and cramps.  Roughly 30% of people will be amenorrheic over time.  Efficacy rate of 99.2% over 6 years was discussed.  Spontaneous expulsion rate of 1-2% was also discussed.  If she has any issue with fever or excessive bleeding or pain she is to call the  office immediately.  Otherwise I would like to see her back in 6 weeks with an ultrasound to confirm correct placement.    This note was electronically signed.    John May MD

## 2021-02-11 DIAGNOSIS — M54.2 CHRONIC NECK PAIN: ICD-10-CM

## 2021-02-11 DIAGNOSIS — M48.02 SPINAL STENOSIS IN CERVICAL REGION: ICD-10-CM

## 2021-02-11 DIAGNOSIS — M54.12 CERVICAL RADICULOPATHY: ICD-10-CM

## 2021-02-11 DIAGNOSIS — G89.29 CHRONIC NECK PAIN: ICD-10-CM

## 2021-02-11 DIAGNOSIS — M50.20 CERVICAL DISC HERNIATION: ICD-10-CM

## 2021-02-12 RX ORDER — GABAPENTIN 300 MG/1
CAPSULE ORAL
Qty: 270 CAPSULE | Refills: 2 | Status: SHIPPED | OUTPATIENT
Start: 2021-02-12 | End: 2021-07-01

## 2021-04-05 ENCOUNTER — HOSPITAL ENCOUNTER (OUTPATIENT)
Dept: MAMMOGRAPHY | Facility: HOSPITAL | Age: 41
End: 2021-04-05

## 2021-04-07 ENCOUNTER — IMMUNIZATION (OUTPATIENT)
Dept: VACCINE CLINIC | Facility: HOSPITAL | Age: 41
End: 2021-04-07

## 2021-04-07 PROCEDURE — 91300 HC SARSCOV02 VAC 30MCG/0.3ML IM: CPT | Performed by: INTERNAL MEDICINE

## 2021-04-07 PROCEDURE — 0001A: CPT | Performed by: INTERNAL MEDICINE

## 2021-04-20 ENCOUNTER — OFFICE VISIT (OUTPATIENT)
Dept: INTERNAL MEDICINE | Facility: CLINIC | Age: 41
End: 2021-04-20

## 2021-04-20 VITALS
SYSTOLIC BLOOD PRESSURE: 140 MMHG | HEART RATE: 96 BPM | DIASTOLIC BLOOD PRESSURE: 75 MMHG | WEIGHT: 208 LBS | HEIGHT: 65 IN | OXYGEN SATURATION: 98 % | BODY MASS INDEX: 34.66 KG/M2 | RESPIRATION RATE: 18 BRPM | TEMPERATURE: 97.7 F

## 2021-04-20 DIAGNOSIS — M54.12 CERVICAL RADICULOPATHY: ICD-10-CM

## 2021-04-20 DIAGNOSIS — M54.2 CHRONIC NECK PAIN: ICD-10-CM

## 2021-04-20 DIAGNOSIS — G89.29 CHRONIC NECK PAIN: ICD-10-CM

## 2021-04-20 DIAGNOSIS — G43.909 MIGRAINE WITHOUT STATUS MIGRAINOSUS, NOT INTRACTABLE, UNSPECIFIED MIGRAINE TYPE: Primary | ICD-10-CM

## 2021-04-20 PROCEDURE — 99214 OFFICE O/P EST MOD 30 MIN: CPT | Performed by: FAMILY MEDICINE

## 2021-04-20 RX ORDER — CELECOXIB 200 MG/1
200 CAPSULE ORAL 2 TIMES DAILY
Qty: 180 CAPSULE | Refills: 3 | Status: SHIPPED | OUTPATIENT
Start: 2021-04-20 | End: 2022-01-31

## 2021-04-20 RX ORDER — ERENUMAB-AOOE 140 MG/ML
140 INJECTION, SOLUTION SUBCUTANEOUS
Qty: 1 PEN | Refills: 0 | COMMUNITY
Start: 2021-04-20 | End: 2021-07-01

## 2021-04-20 RX ORDER — ERENUMAB-AOOE 70 MG/ML
70 INJECTION SUBCUTANEOUS
Qty: 3 PEN | Refills: 4 | Status: SHIPPED | OUTPATIENT
Start: 2021-04-20 | End: 2021-12-29 | Stop reason: CLARIF

## 2021-04-20 NOTE — PROGRESS NOTES
"Subjective    Heidi Franklin is a 40 y.o. female here for:  Chief Complaint   Patient presents with   • Neck Pain     Had a migraine for 18 days that stopped about 4 days ago. Still has pain in her neck. Pain will go from the neck up into the back of the head. She states she has \"felt different every since the migraine\". Arms are weak and hurt, she can not hold onto anything.        History per MA reviewed.    Tried coming off gabapentin which makes her tired somewhat. Did x 4 weeks approx and had a lot of pain, irritiblity, etc. Looking back that's when she had the 18 day headache. She has more than four days a month affected by migraine. Doesn't like taking Imitrex because it causes discomfort, adverse effects not well tolerated. nurtec didn't do much when she tried it. Hasn't tried ubrelvy yet. Has tried Topamax for migraine prevention and didn't help. NS suggests cervicogenic headaches at times due to her chronic neck issues s/p surgery. She continues to bella numbness in her arms from about elbows down. Usually just takes gabapentin once a day at bedtime. She has tried valium on occasion for muscle pain but it makes her \"loopy\" as do muscle relaxers. She still has plenty of valium from Rx I sent in and she filled July 2020. Not on NSAID, when she had surgery she knew NSAIDs hindered bone healing so she's not taken them often. She usually takes tylenol.         The following portions of the patient's history were reviewed and updated as appropriate: allergies, current medications, past family history, past medical history, past social history, past surgical history and problem list.    Review of Systems   Musculoskeletal: Positive for arthralgias.   Neurological: Positive for headache.   Psychiatric/Behavioral: Positive for stress.       Visit Vitals  /75   Pulse 96   Temp 97.7 °F (36.5 °C) (Temporal)   Resp 18   Ht 165.1 cm (65\")   Wt 94.3 kg (208 lb)   SpO2 98%   BMI 34.61 kg/m²         Objective "   Physical Exam  Vitals and nursing note reviewed.   Constitutional:       General: She is not in acute distress.     Appearance: Normal appearance. She is well-developed and well-groomed. She is obese. She is not ill-appearing, toxic-appearing or diaphoretic.      Interventions: Face mask in place.   HENT:      Head: Normocephalic and atraumatic.      Right Ear: Hearing normal.      Left Ear: Hearing normal.   Eyes:      General: Lids are normal. No scleral icterus.     Extraocular Movements: Extraocular movements intact.   Neck:      Trachea: Phonation normal.   Pulmonary:      Effort: Pulmonary effort is normal.   Skin:     Coloration: Skin is not jaundiced.   Neurological:      General: No focal deficit present.      Mental Status: She is alert and oriented to person, place, and time.      Motor: Motor function is intact.   Psychiatric:         Attention and Perception: Attention and perception normal.         Mood and Affect: Mood and affect normal.         Speech: Speech normal.         Behavior: Behavior normal. Behavior is cooperative.         Thought Content: Thought content normal.         Cognition and Memory: Cognition and memory normal.         Judgment: Judgment normal.         For medical decision making review of the following was required:  Component      Latest Ref Rng & Units 11/9/2020   Glucose      65 - 99 mg/dL 91   BUN      6 - 20 mg/dL 12   Creatinine      0.57 - 1.00 mg/dL 0.67   eGFR Non African Am      >60 mL/min/1.73 97   eGFR African Am      >60 mL/min/1.73 117   BUN/Creatinine Ratio      7.0 - 25.0 17.9   Sodium      136 - 145 mmol/L 137   Potassium      3.5 - 5.2 mmol/L 5.0   Chloride      98 - 107 mmol/L 101   CO2      22.0 - 29.0 mmol/L 28.8   Calcium      8.6 - 10.5 mg/dL 9.2   Total Protein      6.0 - 8.5 g/dL 6.6   Albumin      3.50 - 5.20 g/dL 4.20   Globulin      gm/dL 2.4   A/G Ratio      g/dL 1.8   Total Bilirubin      0.0 - 1.2 mg/dL 0.2   Alkaline Phosphatase      39 - 117  U/L 110   AST (SGOT)      1 - 32 U/L 13   ALT (SGPT)      1 - 33 U/L 14     Lab Results   Component Value Date    SEDRATE 16 12/02/2020     Lab Results   Component Value Date    CRP 1.67 (H) 12/02/2020     Component      Latest Ref Rng & Units 12/2/2020   CCP Antibodies IgG/IgA      0 - 19 units 8     Component      Latest Ref Rng & Units 12/2/2020   Rheumatoid Factor Quantitative      0.0 - 14.0 IU/mL <10.0         Assessment/Plan     Problem List Items Addressed This Visit        Musculoskeletal and Injuries    Chronic neck pain    Relevant Medications    celecoxib (CeleBREX) 200 MG capsule       Neuro    Headache, migraine - Primary    Relevant Medications    Erenumab-aooe (Aimovig) 70 MG/ML prefilled syringe    celecoxib (CeleBREX) 200 MG capsule    Erenumab-aooe (Aimovig) 140 MG/ML prefilled syringe    Cervical radiculopathy    Relevant Medications    celecoxib (CeleBREX) 200 MG capsule          · Encouraged use of NSAIDs to calm inflammation. Reviewed labs performed by orthopedics, negative for rheumatoid arthritis. Discussed use of gabapentin for nerve pain. May continue muscle relaxers as needed. Has failed Topamax for migraine prevention, has more than 4 days a month affected by migraine. Sample given today of 140 mg dose as we didn't have 70 mg. She'll start 70 mg dose next month. Discussed once monthly dosing. Try ubrelvy as previously prescribed for migraine, try to avoid triptan due to side effects plus SNRI use. YSABEL reviewed and appropriate.      Return in about 2 months (around 6/20/2021) for migraines, neck pain.     Martine Evans MD

## 2021-04-28 ENCOUNTER — IMMUNIZATION (OUTPATIENT)
Dept: VACCINE CLINIC | Facility: HOSPITAL | Age: 41
End: 2021-04-28

## 2021-04-28 PROCEDURE — 91300 HC SARSCOV02 VAC 30MCG/0.3ML IM: CPT | Performed by: INTERNAL MEDICINE

## 2021-04-28 PROCEDURE — 0002A: CPT | Performed by: INTERNAL MEDICINE

## 2021-05-05 ENCOUNTER — HOSPITAL ENCOUNTER (OUTPATIENT)
Dept: ULTRASOUND IMAGING | Facility: HOSPITAL | Age: 41
Discharge: HOME | End: 2021-05-05

## 2021-05-05 ENCOUNTER — HOSPITAL ENCOUNTER (OUTPATIENT)
Dept: MAMMOGRAPHY | Facility: HOSPITAL | Age: 41
Discharge: HOME | End: 2021-05-05

## 2021-05-05 DIAGNOSIS — R92.8 FOLLOW-UP EXAMINATION OF ABNORMAL MAMMOGRAM: ICD-10-CM

## 2021-05-05 DIAGNOSIS — R92.8 ABNORMAL MAMMOGRAM: ICD-10-CM

## 2021-05-05 PROCEDURE — G0279 TOMOSYNTHESIS, MAMMO: HCPCS

## 2021-05-05 PROCEDURE — 76642 ULTRASOUND BREAST LIMITED: CPT

## 2021-05-05 PROCEDURE — 77066 DX MAMMO INCL CAD BI: CPT

## 2021-05-11 NOTE — PROGRESS NOTES
Patient: Heidi Franklin    YOB: 1980    Date: 05/12/2021    Primary Care Provider: Martine Evans MD    Chief Complaint   Patient presents with   • Follow-up     mammogram       SUBJECTIVE:    History of present illness:  Patient is s/p left breast biopsy which showed stromal fibrosis and focal fibroadenomatoid change.  Patient is here for follow-up mammogram with bilateral ultrasound which was done 05/05/2021.  Mammogram was BI RADS category 2 with benign findings.  Ultrasound showed multiple cysts present within both breasts.    The patient reports no problems at this time, she did get vaccinated for Covid.    The following portions of the patient's history were reviewed and updated as appropriate: allergies, current medications, past family history, past medical history, past social history, past surgical history and problem list.      Review of Systems   Constitutional: Negative for chills, fever and unexpected weight change.   HENT: Negative for hearing loss, trouble swallowing and voice change.    Eyes: Negative for visual disturbance.   Respiratory: Negative for apnea, cough, chest tightness, shortness of breath and wheezing.    Cardiovascular: Negative for chest pain, palpitations and leg swelling.   Gastrointestinal: Negative for abdominal distention, abdominal pain, anal bleeding, blood in stool, constipation, diarrhea, nausea, rectal pain and vomiting.   Endocrine: Negative for cold intolerance and heat intolerance.   Genitourinary: Negative for difficulty urinating, dysuria and flank pain.   Musculoskeletal: Negative for back pain and gait problem.   Skin: Negative for color change, rash and wound.   Neurological: Negative for dizziness, syncope, speech difficulty, weakness, light-headedness, numbness and headaches.   Hematological: Negative for adenopathy. Does not bruise/bleed easily.   Psychiatric/Behavioral: Negative for confusion. The patient is not nervous/anxious.         Allergies:  Allergies   Allergen Reactions   • Citalopram Other (See Comments)       Medications:    Current Outpatient Medications:   •  methocarbamol (ROBAXIN) 500 MG tablet, Take 500 mg by mouth 4 (Four) Times a Day., Disp: , Rfl:   •  pregabalin (LYRICA) 75 MG capsule, Take 75 mg by mouth 2 (Two) Times a Day., Disp: , Rfl:   •  acetaminophen (TYLENOL) 500 MG tablet, Take 500 mg by mouth Every 6 (Six) Hours As Needed for mild pain (1-3)., Disp: , Rfl:   •  celecoxib (CeleBREX) 200 MG capsule, Take 1 capsule by mouth 2 (Two) Times a Day., Disp: 180 capsule, Rfl: 3  •  Cholecalciferol (Vitamin D3) 1.25 MG (96628 UT) capsule, Take 1 capsule by mouth Every 7 (Seven) Days. Indications: Vitamin D Deficiency, Disp: 12 capsule, Rfl: 1  •  cyclobenzaprine (FLEXERIL) 5 MG tablet, take 1 tablet by mouth three times a day if needed, Disp: 90 tablet, Rfl: 5  •  diazePAM (VALIUM) 5 MG tablet, Take 1 tablet by mouth 3 (Three) Times a Day As Needed for Muscle Spasms., Disp: 90 tablet, Rfl: 1  •  DULoxetine (Cymbalta) 60 MG capsule, Take 1 capsule by mouth Daily., Disp: 90 capsule, Rfl: 3  •  Erenumab-aooe (Aimovig) 140 MG/ML prefilled syringe, Inject 1 mL under the skin into the appropriate area as directed Every 30 (Thirty) Days., Disp: 1 pen, Rfl: 0  •  Erenumab-aooe (Aimovig) 70 MG/ML prefilled syringe, Inject 1 mL under the skin into the appropriate area as directed Every 30 (Thirty) Days., Disp: 3 pen, Rfl: 4  •  gabapentin (NEURONTIN) 300 MG capsule, TAKE 1 CAPSULE BY MOUTH THREE TIMES DAILY, Disp: 270 capsule, Rfl: 2  •  HYDROcodone-acetaminophen (NORCO)  MG per tablet, Take 1 tablet by mouth Every 8 (Eight) Hours As Needed for Moderate Pain  or Severe Pain ., Disp: , Rfl:   •  Levonorgestrel (MIRENA, 52 MG, IU), by Intrauterine route., Disp: , Rfl:   •  levonorgestrel (Mirena, 52 MG,) 20 MCG/24HR IUD, Deliver to provider's office, Disp: 1 each, Rfl: 0  •  Rimegepant Sulfate (rimegepant) 75 MG tablet  dispersible, Take 75 mg by mouth Daily As Needed (migraine)., Disp: 8 tablet, Rfl: 1  •  SUMAtriptan (IMITREX) 100 MG tablet, Take 1 tablet by mouth 1 (One) Time As Needed for Migraine for up to 1 dose., Disp: 27 tablet, Rfl: 3    Current Facility-Administered Medications:   •  levonorgestrel (MIRENA) 20 MCG/24HR IUD, , Intrauterine, Once, John May MD    History:  Past Medical History:   Diagnosis Date   • Anxiety    • Arthritis    • Carpal tunnel syndrome     right   • Cervical disc herniation 2016   • Encounter for insertion of mirena IUD 2016   • Encounter for insertion of mirena IUD 2016   • Headache    • History of Papanicolaou smear of cervix 10/20/2015    WNL   • History of Papanicolaou smear of cervix 2016    WNL   • Low back pain    • Scoliosis        Past Surgical History:   Procedure Laterality Date   • BREAST BIOPSY     • BREAST LUMPECTOMY      right   • BREAST SURGERY Right    •  SECTION     • INTERVENTIONAL RADIOLOGY PROCEDURE N/A 2017    Procedure:  myelogram cervical spine;  Surgeon: Moody Fitch MD;  [facility];  Service:    • OTHER SURGICAL HISTORY      Breast Mammatome, Needle Biopsy, ACDF    • SPINE SURGERY  2017, 2018    ACDF C5-6 and C6-7, ACDF C4-5   • SPINE SURGERY  2019    3rd attempt   • US GUIDED CYST ASPIRATION BREAST N/A 2020   • US GUIDED CYST ASPIRATION BREAST N/A 2020   • US GUIDED CYST ASPIRATION BREAST N/A 2021       Family History   Problem Relation Age of Onset   • COPD Other    • Arthritis Maternal Aunt         Spine   • Breast cancer Maternal Aunt    • Arthritis Maternal Aunt         Spine   • Drug abuse Maternal Aunt             • Breast cancer Maternal Aunt    • Arthritis Maternal Grandmother         Spine   • Cancer Mother         Throat Cancer   • COPD Mother                 Social History     Tobacco Use   • Smoking status:  "Current Every Day Smoker     Packs/day: 1.00     Years: 15.00     Pack years: 15.00   • Smokeless tobacco: Never Used   Substance Use Topics   • Alcohol use: No   • Drug use: No        OBJECTIVE:    Vital Signs:   Vitals:    05/12/21 1327   BP: 134/88   Pulse: 105   Temp: 97.5 °F (36.4 °C)   SpO2: 98%   Weight: 95.7 kg (211 lb)   Height: 165.1 cm (65\")       Physical Exam:   General Appearance:    Alert, cooperative, in no acute distress   Head:    Normocephalic, without obvious abnormality, atraumatic   Eyes:            Lids and lashes normal, conjunctivae and sclerae normal, no   icterus, no pallor, corneas clear, PERRLA   Ears:    Ears appear intact with no abnormalities noted   Throat:   No oral lesions, no thrush, oral mucosa moist   Neck:   No adenopathy, supple, trachea midline, no thyromegaly, no   carotid bruit, no JVD   Lungs:     Clear to auscultation,respirations regular, even and                  unlabored    Heart:    Regular rhythm and normal rate, normal S1 and S2, no            murmur, no gallop, no rub, no click   Chest Wall:    No abnormalities observed   Abdomen:     Normal bowel sounds, no masses, no organomegaly, soft        non-tender, non-distended, no guarding, no rebound                tenderness   Extremities:   Moves all extremities well, no edema, no cyanosis, no             redness   Pulses:   Pulses palpable and equal bilaterally   Skin:   No bleeding, bruising or rash, bilateral breast examination reveals no evidence of suspicious masses   Lymph nodes:   No palpable adenopathy   Neurologic:   Cranial nerves 2 - 12 grossly intact, sensation intact, DTR       present and equal bilaterally     Results Review:   I reviewed the patient's new clinical results.  I reviewed the patient's new imaging results and agree with the interpretation.  I reviewed the patient's other test results and agree with the interpretation    Review of Systems was reviewed and confirmed as accurate as documented " by the MA.    ASSESSMENT/PLAN:    1. Abnormal mammogram        In short, I did have a detailed and extensive discussion with the patient in the office today and reviewed her mammogram which was fairly normal.  I want to see her back in the office in 1 year with a follow-up bilateral mammogram.    I discussed the patients findings and my recommendations with patient.        Electronically signed by Rizwan Perera MD  05/12/21

## 2021-05-12 ENCOUNTER — OFFICE VISIT (OUTPATIENT)
Dept: SURGERY | Facility: CLINIC | Age: 41
End: 2021-05-12

## 2021-05-12 VITALS
HEIGHT: 65 IN | DIASTOLIC BLOOD PRESSURE: 88 MMHG | TEMPERATURE: 97.5 F | BODY MASS INDEX: 35.16 KG/M2 | HEART RATE: 105 BPM | WEIGHT: 211 LBS | OXYGEN SATURATION: 98 % | SYSTOLIC BLOOD PRESSURE: 134 MMHG

## 2021-05-12 DIAGNOSIS — R92.8 ABNORMAL MAMMOGRAM: Primary | ICD-10-CM

## 2021-05-12 PROCEDURE — 99213 OFFICE O/P EST LOW 20 MIN: CPT | Performed by: SURGERY

## 2021-05-12 RX ORDER — METHOCARBAMOL 500 MG/1
500 TABLET, FILM COATED ORAL 4 TIMES DAILY
COMMUNITY
End: 2021-12-29 | Stop reason: SDUPTHER

## 2021-05-12 RX ORDER — PREGABALIN 75 MG/1
75 CAPSULE ORAL 2 TIMES DAILY
COMMUNITY
End: 2021-12-29 | Stop reason: SDUPTHER

## 2021-05-17 RX ORDER — CHOLECALCIFEROL (VITAMIN D3) 1250 MCG
50000 CAPSULE ORAL
Qty: 4 CAPSULE | Refills: 0 | Status: SHIPPED | OUTPATIENT
Start: 2021-05-17 | End: 2021-06-29 | Stop reason: SDUPTHER

## 2021-05-26 ENCOUNTER — OFFICE VISIT (OUTPATIENT)
Dept: ORTHOPEDIC SURGERY | Facility: CLINIC | Age: 41
End: 2021-05-26

## 2021-05-26 VITALS — TEMPERATURE: 97.4 F | HEIGHT: 65 IN | BODY MASS INDEX: 35.16 KG/M2 | WEIGHT: 211 LBS

## 2021-05-26 DIAGNOSIS — M17.0 PRIMARY OSTEOARTHRITIS OF BOTH KNEES: ICD-10-CM

## 2021-05-26 DIAGNOSIS — M22.2X2 PATELLOFEMORAL DISORDER OF BOTH KNEES: Primary | ICD-10-CM

## 2021-05-26 DIAGNOSIS — M22.2X1 PATELLOFEMORAL DISORDER OF BOTH KNEES: Primary | ICD-10-CM

## 2021-05-26 PROCEDURE — 99213 OFFICE O/P EST LOW 20 MIN: CPT | Performed by: PHYSICIAN ASSISTANT

## 2021-05-26 PROCEDURE — 20610 DRAIN/INJ JOINT/BURSA W/O US: CPT | Performed by: PHYSICIAN ASSISTANT

## 2021-05-26 RX ORDER — METHYLPREDNISOLONE ACETATE 40 MG/ML
40 INJECTION, SUSPENSION INTRA-ARTICULAR; INTRALESIONAL; INTRAMUSCULAR; SOFT TISSUE
Status: COMPLETED | OUTPATIENT
Start: 2021-05-26 | End: 2021-05-26

## 2021-05-26 RX ORDER — LIDOCAINE HYDROCHLORIDE 10 MG/ML
2 INJECTION, SOLUTION INFILTRATION; PERINEURAL
Status: COMPLETED | OUTPATIENT
Start: 2021-05-26 | End: 2021-05-26

## 2021-05-26 RX ADMIN — METHYLPREDNISOLONE ACETATE 40 MG: 40 INJECTION, SUSPENSION INTRA-ARTICULAR; INTRALESIONAL; INTRAMUSCULAR; SOFT TISSUE at 15:27

## 2021-05-26 RX ADMIN — LIDOCAINE HYDROCHLORIDE 2 ML: 10 INJECTION, SOLUTION INFILTRATION; PERINEURAL at 15:27

## 2021-05-26 RX ADMIN — LIDOCAINE HYDROCHLORIDE 2 ML: 10 INJECTION, SOLUTION INFILTRATION; PERINEURAL at 15:26

## 2021-05-26 RX ADMIN — METHYLPREDNISOLONE ACETATE 40 MG: 40 INJECTION, SUSPENSION INTRA-ARTICULAR; INTRALESIONAL; INTRAMUSCULAR; SOFT TISSUE at 15:26

## 2021-05-26 NOTE — PROGRESS NOTES
"Subjective   Patient ID: Heidi Franklin is a 40 y.o. right hand dominant female  Follow-up of the Left Knee (Recheck of bilateral knee pain. Left is worse.  States both knees feel weak and are \"giving out\") and Follow-up of the Right Knee         History of Present Illness  Patient presents with c/o bilateral knee pain. Both knees often feel weak.  Pain is aching and dull.   She has tried OTC tylenol with minimal relief.   Did have a prior left knee injection last year which provided only minimal relief.                                                      Past Medical History:   Diagnosis Date   • Anxiety    • Arthritis    • Carpal tunnel syndrome     right   • Cervical disc herniation 2016   • Encounter for insertion of mirena IUD 2016   • Encounter for insertion of mirena IUD 2016   • Headache    • History of Papanicolaou smear of cervix 10/20/2015    WNL   • History of Papanicolaou smear of cervix 2016    WNL   • Low back pain    • Scoliosis         Past Surgical History:   Procedure Laterality Date   • BREAST BIOPSY     • BREAST LUMPECTOMY      right   • BREAST SURGERY Right    •  SECTION     • INTERVENTIONAL RADIOLOGY PROCEDURE N/A 2017    Procedure:  myelogram cervical spine;  Surgeon: Moody Fitch MD;  Location: [facility] INVASIVE LOCATION;  Service:    • OTHER SURGICAL HISTORY      Breast Mammatome, Needle Biopsy, ACDF    • SPINE SURGERY  2017, 2018    ACDF C5-6 and C6-7, ACDF C4-5   • SPINE SURGERY  2019    3rd attempt   • US GUIDED CYST ASPIRATION BREAST N/A 2020   • US GUIDED CYST ASPIRATION BREAST N/A 2020   • US GUIDED CYST ASPIRATION BREAST N/A 2021       Family History   Problem Relation Age of Onset   • COPD Other    • Arthritis Maternal Aunt         Spine   • Breast cancer Maternal Aunt    • Arthritis Maternal Aunt         Spine   • Drug abuse Maternal Aunt             • Breast cancer Maternal Aunt    • " Arthritis Maternal Grandmother         Spine   • Cancer Mother         Throat Cancer   • COPD Mother          2018       Social History     Socioeconomic History   • Marital status:      Spouse name: Not on file   • Number of children: Not on file   • Years of education: Not on file   • Highest education level: Not on file   Tobacco Use   • Smoking status: Current Every Day Smoker     Packs/day: 1.00     Years: 15.00     Pack years: 15.00   • Smokeless tobacco: Never Used   Substance and Sexual Activity   • Alcohol use: No   • Drug use: No   • Sexual activity: Yes     Partners: Male     Birth control/protection: I.U.D.     Comment: Mirena         Current Outpatient Medications:   •  acetaminophen (TYLENOL) 500 MG tablet, Take 500 mg by mouth Every 6 (Six) Hours As Needed for mild pain (1-3)., Disp: , Rfl:   •  celecoxib (CeleBREX) 200 MG capsule, Take 1 capsule by mouth 2 (Two) Times a Day., Disp: 180 capsule, Rfl: 3  •  Cholecalciferol (Vitamin D3) 1.25 MG (43908 UT) capsule, Take 1 capsule by mouth Every 7 (Seven) Days. Indications: Vitamin D Deficiency, Disp: 4 capsule, Rfl: 0  •  diazePAM (VALIUM) 5 MG tablet, Take 1 tablet by mouth 3 (Three) Times a Day As Needed for Muscle Spasms., Disp: 90 tablet, Rfl: 1  •  DULoxetine (Cymbalta) 60 MG capsule, Take 1 capsule by mouth Daily., Disp: 90 capsule, Rfl: 3  •  Erenumab-aooe (Aimovig) 140 MG/ML prefilled syringe, Inject 1 mL under the skin into the appropriate area as directed Every 30 (Thirty) Days., Disp: 1 pen, Rfl: 0  •  HYDROcodone-acetaminophen (NORCO)  MG per tablet, Take 1 tablet by mouth Every 8 (Eight) Hours As Needed for Moderate Pain  or Severe Pain ., Disp: , Rfl:   •  Levonorgestrel (MIRENA, 52 MG, IU), by Intrauterine route., Disp: , Rfl:   •  methocarbamol (ROBAXIN) 500 MG tablet, Take 500 mg by mouth 4 (Four) Times a Day., Disp: , Rfl:   •  pregabalin (LYRICA) 75 MG capsule, Take 75 mg by mouth 2 (Two) Times a Day., Disp: , Rfl:  "  •  Rimegepant Sulfate (rimegepant) 75 MG tablet dispersible, Take 75 mg by mouth Daily As Needed (migraine)., Disp: 8 tablet, Rfl: 1  •  SUMAtriptan (IMITREX) 100 MG tablet, Take 1 tablet by mouth 1 (One) Time As Needed for Migraine for up to 1 dose., Disp: 27 tablet, Rfl: 3  •  cyclobenzaprine (FLEXERIL) 5 MG tablet, take 1 tablet by mouth three times a day if needed, Disp: 90 tablet, Rfl: 5  •  Erenumab-aooe (Aimovig) 70 MG/ML prefilled syringe, Inject 1 mL under the skin into the appropriate area as directed Every 30 (Thirty) Days., Disp: 3 pen, Rfl: 4  •  gabapentin (NEURONTIN) 300 MG capsule, TAKE 1 CAPSULE BY MOUTH THREE TIMES DAILY, Disp: 270 capsule, Rfl: 2  •  levonorgestrel (Mirena, 52 MG,) 20 MCG/24HR IUD, Deliver to provider's office, Disp: 1 each, Rfl: 0    Current Facility-Administered Medications:   •  levonorgestrel (MIRENA) 20 MCG/24HR IUD, , Intrauterine, Once, John May MD    Allergies   Allergen Reactions   • Citalopram Other (See Comments)       Review of Systems   Constitutional: Negative for diaphoresis, fever and unexpected weight change.   HENT: Negative for dental problem and sore throat.    Eyes: Negative for visual disturbance.   Respiratory: Negative for shortness of breath.    Cardiovascular: Negative for chest pain.   Gastrointestinal: Negative for abdominal pain, constipation, diarrhea, nausea and vomiting.   Genitourinary: Negative for difficulty urinating and frequency.   Musculoskeletal: Positive for arthralgias.   Neurological: Negative for headaches.   Hematological: Does not bruise/bleed easily.       I have reviewed the medical and surgical history, family history, social history, medications, and/or allergies, and the review of systems of this report.    Objective   Temp 97.4 °F (36.3 °C)   Ht 165.1 cm (65\")   Wt 95.7 kg (211 lb)   BMI 35.11 kg/m²    Physical Exam  Vitals and nursing note reviewed.   Constitutional:       Appearance: Normal appearance. "   Pulmonary:      Effort: Pulmonary effort is normal.   Musculoskeletal:      Right knee:      Instability Tests: Medial Juju test negative.      Left knee:      Instability Tests: Medial Juju test negative.   Neurological:      Mental Status: She is alert and oriented to person, place, and time.   Psychiatric:         Behavior: Behavior normal.       Right Knee Exam     Range of Motion   Extension: 5   Flexion: 90     Tests   Juju:  Medial - negative   Varus: negative   Patellar apprehension: trace    Other   Erythema: absent  Sensation: normal      Left Knee Exam     Range of Motion   Extension: 5   Flexion: 90     Tests   Juju:  Medial - negative   Varus: negative   Patellar apprehension: trace    Other   Erythema: absent  Sensation: normal           Extremity DVT signs are negative on physical exam with negative Abril sign, no calf pain, no palpable cords and no skin tone change   Neurologic Exam     Mental Status   Oriented to person, place, and time.      Bilateral knee patella crepitus        Assessment/Plan   Independent Review of Radiographic Studies:    Xray of bilateral knee 3 view in office reveals medial joint space narrowing with tricompartmental arthritic changes.       Large Joint Arthrocentesis: R knee  Date/Time: 5/26/2021 3:26 PM  Consent given by: patient  Site marked: site marked  Timeout: Immediately prior to procedure a time out was called to verify the correct patient, procedure, equipment, support staff and site/side marked as required   Supporting Documentation  Indications: pain   Procedure Details  Location: knee - R knee  Preparation: Patient was prepped and draped in the usual sterile fashion  Needle size: 22 G  Approach: anterolateral  Medications administered: 40 mg methylPREDNISolone acetate 40 MG/ML; 2 mL lidocaine 1 %  Patient tolerance: patient tolerated the procedure well with no immediate complications    Large Joint Arthrocentesis: L knee  Date/Time: 5/26/2021  3:27 PM  Consent given by: patient  Site marked: site marked  Timeout: Immediately prior to procedure a time out was called to verify the correct patient, procedure, equipment, support staff and site/side marked as required   Supporting Documentation  Indications: pain   Procedure Details  Location: knee - L knee  Preparation: Patient was prepped and draped in the usual sterile fashion  Needle size: 22 G  Approach: anterolateral  Medications administered: 40 mg methylPREDNISolone acetate 40 MG/ML; 2 mL lidocaine 1 %  Patient tolerance: patient tolerated the procedure well with no immediate complications             Diagnoses and all orders for this visit:    1. Patellofemoral disorder of both knees (Primary)  -     XR Knee 3 View Bilateral; Future  -     Ambulatory Referral to Physical Therapy Evaluate and treat, Ortho; Heat; Strengthening, ROM, Stretching    2. Primary osteoarthritis of both knees  -     Ambulatory Referral to Physical Therapy Evaluate and treat, Ortho; Heat; Strengthening, ROM, Stretching    Other orders  -     Large Joint Arthrocentesis: R knee  -     Large Joint Arthrocentesis: L knee       Orthopedic activities reviewed and patient expressed appreciation  Discussion of orthopedic goals  Risk, benefits, and merits of treatment alternatives reviewed with the patient and questions answered  Call or notify for any adverse effect from injection therapy    Recommendations/Plan:  Exercise, medications, injections, other patient advice, and return appointment as noted.  Patient is encouraged to call or return for any issues or concerns.    Patient agreeable to call or return sooner for any concerns.               EMR Dragon-transcription disclaimer:  This encounter note is an electronic transcription of spoken language to printed text.  Electronic transcription of spoken language may permit erroneous or at times nonsensical words or phrases to be inadvertently transcribed.  Although I have reviewed the  note for such errors, some may still exist

## 2021-06-04 ENCOUNTER — PRIOR AUTHORIZATION (OUTPATIENT)
Dept: INTERNAL MEDICINE | Facility: CLINIC | Age: 41
End: 2021-06-04

## 2021-06-25 ENCOUNTER — TREATMENT (OUTPATIENT)
Dept: PHYSICAL THERAPY | Facility: CLINIC | Age: 41
End: 2021-06-25

## 2021-06-25 DIAGNOSIS — M17.0 OSTEOARTHRITIS OF BOTH KNEES, UNSPECIFIED OSTEOARTHRITIS TYPE: Primary | ICD-10-CM

## 2021-06-25 DIAGNOSIS — M25.562 CHRONIC PAIN OF BOTH KNEES: ICD-10-CM

## 2021-06-25 DIAGNOSIS — M25.561 CHRONIC PAIN OF BOTH KNEES: ICD-10-CM

## 2021-06-25 DIAGNOSIS — G89.29 CHRONIC PAIN OF BOTH KNEES: ICD-10-CM

## 2021-06-25 PROCEDURE — 97110 THERAPEUTIC EXERCISES: CPT | Performed by: PHYSICAL THERAPIST

## 2021-06-25 PROCEDURE — 97161 PT EVAL LOW COMPLEX 20 MIN: CPT | Performed by: PHYSICAL THERAPIST

## 2021-06-25 NOTE — PROGRESS NOTES
Physical Therapy Initial Evaluation and Plan of Care      Patient: Heidi Franklin   : 1980  Diagnosis/ICD-10 Code:  Osteoarthritis of both knees, unspecified osteoarthritis type [M17.0]  Referring practitioner: MEHDI Matute*    Subjective Evaluation    History of Present Illness  Mechanism of injury: Patient reports that her knees have been hurting for a year now. She states that her L knee pops a lot but states that one knee does not seem to be worse than the other. She states that her knees always hurt. She states that she can only walk short distances because of the knee pain. She states she had a fall 3-4 weeks ago getting out of the bathtub. She states that her knees were sore for awhile due to this.     Patient reports that she has a Baker cyst in the L knee.       Patient Occupation: Retired (disabled) Quality of life: good    Pain  Current pain ratin  At best pain ratin  At worst pain rating: 10  Quality: sharp and dull ache  Relieving factors: rest and heat  Aggravating factors: ambulation, squatting, lifting, stairs, prolonged positioning, repetitive movement, movement and standing  Progression: worsening    Social Support  Lives in: one-story house  Lives with: spouse    Diagnostic Tests  X-ray: abnormal (Medial joint space narrowing in B knees)    Treatments  Previous treatment: injection treatment (Patient reports injections did not help)  Patient Goals  Patient goals for therapy: decreased edema, decreased pain, improved balance, increased motion, increased strength and independence with ADLs/IADLs             Objective          Palpation   Left   Tenderness of the rectus femoris, vastus lateralis and vastus medialis.     Right Tenderness of the rectus femoris, vastus lateralis and vastus medialis.     Tenderness   Left Knee   Tenderness in the lateral joint line and medial joint line.     Right Knee   Tenderness in the lateral joint line and medial joint line.      Neurological Testing     Sensation     Knee   Left Knee   Intact: light touch    Right Knee   Intact: light touch     Reflexes   Left   Patellar (L4): normal (2+)  Achilles (S1): normal (2+)    Right   Patellar (L4): normal (2+)  Achilles (S1): normal (2+)    Additional Neurological Details  Sensation intact and equal on B LE.    Active Range of Motion   Left Knee   Flexion: 123 degrees   Extension: Left knee active extension: 0-2-123.     Right Knee   Flexion: 120 degrees   Extension: Right knee active extension: 0-4-120.     Passive Range of Motion   Left Knee   Flexion: 125 degrees   Extension: 0 degrees     Right Knee   Flexion: 122 degrees   Extension: 0 degrees     Patellar Mobility   Left Knee Patellar tendons within functional limits include the medial and lateral. Hypomobile in the left superior and left inferior patellar tendon(s).     Right Knee Patellar tendons within functional limits include the medial and lateral. Hypomobile in the superior and inferior patellar tendon(s).     Strength/Myotome Testing     Left Hip   Planes of Motion   Flexion: 4-  Extension: 3+  Abduction: 4-  Adduction: 4-    Right Hip   Planes of Motion   Flexion: 4-  Extension: 3+  Abduction: 4-  Adduction: 4-    Left Knee   Flexion: 3+  Extension: 3+  Quadriceps contraction: fair    Right Knee   Flexion: 3+  Extension: 3+  Quadriceps contraction: fair    Tests     Left Knee   Positive patella-femoral grind.   Negative valgus stress test at 0 degrees, valgus stress test at 30 degrees, varus stress test at 0 degrees and varus stress test at 30 degrees.     Right Knee   Positive patella-femoral grind.   Negative valgus stress test at 0 degrees, valgus stress test at 30 degrees, varus stress test at 0 degrees and varus stress test at 30 degrees.      General Comments     Knee Comments  Patient ambulates with mild antalgic gait pattern.     Patient given HEP to assist with quad strengthening. Patient demonstrated understanding of  HEP prior to completion of session.          Assessment & Plan     Assessment  Impairments: abnormal gait, abnormal muscle firing, abnormal or restricted ROM, activity intolerance, impaired physical strength, lacks appropriate home exercise program, pain with function and weight-bearing intolerance  Assessment details: Patient is a 40 year old female who comes to physical therapy with complaints of B knee pain. Signs and symptoms are consistent with chronic knee pain likely due to arthritis of B knees. She currently demonstrates a fair quad contraction in B LE. She has tenderness to palpation along medial and lateral joint line. The patient currently has pain, decreased ROM, decreased strength, and inability to perform all essential functional activities. Pt will benefit from skilled PT services to address the above issues.     Functional Limitations: carrying objects, lifting, sleeping, walking, pulling, pushing, uncomfortable because of pain, sitting, standing, stooping and unable to perform repetitive tasks  Goals  Plan Goals: SHORT TERM GOALS:  2 weeks       1. Pt independent with HEP  2. Pt to demonstrate jessica hip strength 4/5 or greater to improve stability with ambulation  3. Pt to report being able to walk for 10 minutes without increasing pain in the bilateral knee    LONG TERM GOALS:   6 weeks  1. Pt to demonstrate ability to perform full functional squat with good form and control of the knees and without increasing pain  2. Pt to demonstrate jessica hip strength to 4+/5 or greater to improve safety with ambulation on uneven surfaces  3. Pt to demonstrate ability to perform step up/down 8 inch step x10 safely and without pain in the bilateral knee       Plan  Therapy options: will be seen for skilled physical therapy services  Planned modality interventions: cryotherapy and thermotherapy (hydrocollator packs)  Planned therapy interventions: balance/weight-bearing training, functional ROM exercises, gait  training, home exercise program, joint mobilization, therapeutic activities, stretching, strengthening, soft tissue mobilization, neuromuscular re-education and manual therapy  Frequency: 2x week  Duration in weeks: 6  Treatment plan discussed with: patient        Manual Therapy:         mins  99927;  Therapeutic Exercise:    11     mins  13520;     Neuromuscular Ro:        mins  29341;    Therapeutic Activity:          mins  25097;     Gait Training:           mins  15193;     Ultrasound:          mins  74476;    Electrical Stimulation:         mins  17231 ( );  Dry Needling          mins self-pay    Timed Treatment:   11   mins   Total Treatment:     36   mins    PT SIGNATURE: Nicole Flores PT   DATE TREATMENT INITIATED: 6/25/2021    Initial Certification  Certification Period: 9/23/2021  I certify that the therapy services are furnished while this patient is under my care.  The services outlined above are required by this patient, and will be reviewed every 90 days.     PHYSICIAN: Luis Armando Addison PA-C      DATE:     Please sign and return via fax to [phone].. Thank you, [facility].

## 2021-06-28 DIAGNOSIS — F33.1 MODERATE EPISODE OF RECURRENT MAJOR DEPRESSIVE DISORDER (HCC): ICD-10-CM

## 2021-06-28 DIAGNOSIS — M54.2 CHRONIC NECK PAIN: ICD-10-CM

## 2021-06-28 DIAGNOSIS — G89.29 CHRONIC NECK PAIN: ICD-10-CM

## 2021-06-28 RX ORDER — DIAZEPAM 5 MG/1
TABLET ORAL
Qty: 90 TABLET | Refills: 1 | Status: SHIPPED | OUTPATIENT
Start: 2021-06-28 | End: 2022-07-22 | Stop reason: SDUPTHER

## 2021-06-28 RX ORDER — DULOXETIN HYDROCHLORIDE 60 MG/1
60 CAPSULE, DELAYED RELEASE ORAL DAILY
Qty: 90 CAPSULE | Refills: 3 | Status: SHIPPED | OUTPATIENT
Start: 2021-06-28 | End: 2022-07-22

## 2021-06-29 ENCOUNTER — TREATMENT (OUTPATIENT)
Dept: PHYSICAL THERAPY | Facility: CLINIC | Age: 41
End: 2021-06-29

## 2021-06-29 DIAGNOSIS — M17.0 OSTEOARTHRITIS OF BOTH KNEES, UNSPECIFIED OSTEOARTHRITIS TYPE: Primary | ICD-10-CM

## 2021-06-29 DIAGNOSIS — M25.561 CHRONIC PAIN OF BOTH KNEES: ICD-10-CM

## 2021-06-29 DIAGNOSIS — M25.562 CHRONIC PAIN OF BOTH KNEES: ICD-10-CM

## 2021-06-29 DIAGNOSIS — G89.29 CHRONIC PAIN OF BOTH KNEES: ICD-10-CM

## 2021-06-29 PROCEDURE — 97530 THERAPEUTIC ACTIVITIES: CPT | Performed by: PHYSICAL THERAPIST

## 2021-06-29 PROCEDURE — 97110 THERAPEUTIC EXERCISES: CPT | Performed by: PHYSICAL THERAPIST

## 2021-06-29 PROCEDURE — 97140 MANUAL THERAPY 1/> REGIONS: CPT | Performed by: PHYSICAL THERAPIST

## 2021-06-29 NOTE — PROGRESS NOTES
Physical Therapy Daily Progress Note    Patient Information  Heidi Franklin  1980      Visit # : 2    Heidi Franklin reports 6/10 pain today at rest.  Patient reports that she has been really busy which causes her knees to hurt more. She states that she tried to take it easy yesterday. She states her knees are less irritated today. She states that her knee pain is worse in the evening but she states that she has a lot of stiffness in the evening.         Objective Pt presents to PT today with no distress noted.     Patient with mild medial joint line tenderness in R knee.      Patient with mild inferior patellar tenderness in L knee.       See Exercise, Manual, and Modality Logs for complete treatment.     Assessment/Plan  Patient tolerated session well with no reports of pain with exercises. Patient continues to have complaints of pain in B knees during weightbearing. Patient tolerated manual therapy well with no reports of discomfort. Patient encouraged to perform HEP as prescribed to assist with reduction of knee symptoms.       Progress per Plan of Care  PT will continue to monitor patients signs and symptoms and progress patient as tolerated.     Visit Diagnoses:    ICD-10-CM ICD-9-CM   1. Osteoarthritis of both knees, unspecified osteoarthritis type  M17.0 715.96   2. Chronic pain of both knees  M25.561 719.46    M25.562 338.29    G89.29             Manual Therapy:    12     mins  95666;  Therapeutic Exercise:    18     mins  19093;     Neuromuscular Ro:        mins  77764;    Therapeutic Activity:     11     mins  55206;     Gait Training:           mins  98484;     Ultrasound:          mins  21617;    Electrical Stimulation:         mins  58966 ( );  Dry Needling          mins self-pay    Timed Treatment:   41   mins   Total Treatment:     48   mins          Nicole Flores PT  Physical Therapist

## 2021-06-30 RX ORDER — CHOLECALCIFEROL (VITAMIN D3) 1250 MCG
50000 CAPSULE ORAL
Qty: 4 CAPSULE | Refills: 0 | Status: SHIPPED | OUTPATIENT
Start: 2021-06-30 | End: 2021-12-29

## 2021-07-01 ENCOUNTER — OFFICE VISIT (OUTPATIENT)
Dept: INTERNAL MEDICINE | Facility: CLINIC | Age: 41
End: 2021-07-01

## 2021-07-01 VITALS
TEMPERATURE: 97.7 F | HEART RATE: 91 BPM | RESPIRATION RATE: 18 BRPM | HEIGHT: 65 IN | SYSTOLIC BLOOD PRESSURE: 153 MMHG | WEIGHT: 213.5 LBS | DIASTOLIC BLOOD PRESSURE: 94 MMHG | OXYGEN SATURATION: 100 % | BODY MASS INDEX: 35.57 KG/M2

## 2021-07-01 DIAGNOSIS — M25.561 CHRONIC PAIN OF BOTH KNEES: ICD-10-CM

## 2021-07-01 DIAGNOSIS — G43.909 MIGRAINE WITHOUT STATUS MIGRAINOSUS, NOT INTRACTABLE, UNSPECIFIED MIGRAINE TYPE: Primary | ICD-10-CM

## 2021-07-01 DIAGNOSIS — G89.29 CHRONIC PAIN OF BOTH KNEES: ICD-10-CM

## 2021-07-01 DIAGNOSIS — M25.562 CHRONIC PAIN OF BOTH KNEES: ICD-10-CM

## 2021-07-01 DIAGNOSIS — M54.12 CERVICAL RADICULOPATHY: ICD-10-CM

## 2021-07-01 DIAGNOSIS — M54.2 CHRONIC NECK PAIN: ICD-10-CM

## 2021-07-01 DIAGNOSIS — R03.0 ELEVATED BLOOD PRESSURE READING: ICD-10-CM

## 2021-07-01 DIAGNOSIS — G89.29 CHRONIC NECK PAIN: ICD-10-CM

## 2021-07-01 PROCEDURE — 99214 OFFICE O/P EST MOD 30 MIN: CPT | Performed by: FAMILY MEDICINE

## 2021-07-01 RX ORDER — RIMEGEPANT SULFATE 75 MG/75MG
75 TABLET, ORALLY DISINTEGRATING ORAL DAILY PRN
Qty: 8 TABLET | Refills: 11 | Status: SHIPPED | OUTPATIENT
Start: 2021-07-01 | End: 2021-09-18

## 2021-07-01 RX ORDER — AMOXICILLIN 500 MG/1
CAPSULE ORAL
COMMUNITY
Start: 2021-06-28 | End: 2021-12-29

## 2021-07-01 NOTE — PATIENT INSTRUCTIONS
Managing Your Hypertension  Hypertension, also called high blood pressure, is when the force of the blood pressing against the walls of the arteries is too strong. Arteries are blood vessels that carry blood from your heart throughout your body. Hypertension forces the heart to work harder to pump blood and may cause the arteries to become narrow or stiff.  Understanding blood pressure readings  Your personal target blood pressure may vary depending on your medical conditions, your age, and other factors. A blood pressure reading includes a higher number over a lower number. Ideally, your blood pressure should be below 120/80. You should know that:  · The first, or top, number is called the systolic pressure. It is a measure of the pressure in your arteries as your heart beats.  · The second, or bottom number, is called the diastolic pressure. It is a measure of the pressure in your arteries as the heart relaxes.  Blood pressure is classified into four stages. Based on your blood pressure reading, your health care provider may use the following stages to determine what type of treatment you need, if any. Systolic pressure and diastolic pressure are measured in a unit called mmHg.  Normal  · Systolic pressure: below 120.  · Diastolic pressure: below 80.  Elevated  · Systolic pressure: 120-129.  · Diastolic pressure: below 80.  Hypertension stage 1  · Systolic pressure: 130-139.  · Diastolic pressure: 80-89.  Hypertension stage 2  · Systolic pressure: 140 or above.  · Diastolic pressure: 90 or above.  How can this condition affect me?  Managing your hypertension is an important responsibility. Over time, hypertension can damage the arteries and decrease blood flow to important parts of the body, including the brain, heart, and kidneys. Having untreated or uncontrolled hypertension can lead to:  · A heart attack.  · A stroke.  · A weakened blood vessel (aneurysm).  · Heart failure.  · Kidney damage.  · Eye  damage.  · Metabolic syndrome.  · Memory and concentration problems.  · Vascular dementia.  What actions can I take to manage this condition?  Hypertension can be managed by making lifestyle changes and possibly by taking medicines. Your health care provider will help you make a plan to bring your blood pressure within a normal range.  Nutrition    · Eat a diet that is high in fiber and potassium, and low in salt (sodium), added sugar, and fat. An example eating plan is called the Dietary Approaches to Stop Hypertension (DASH) diet. To eat this way:  ? Eat plenty of fresh fruits and vegetables. Try to fill one-half of your plate at each meal with fruits and vegetables.  ? Eat whole grains, such as whole-wheat pasta, brown rice, or whole-grain bread. Fill about one-fourth of your plate with whole grains.  ? Eat low-fat dairy products.  ? Avoid fatty cuts of meat, processed or cured meats, and poultry with skin. Fill about one-fourth of your plate with lean proteins such as fish, chicken without skin, beans, eggs, and tofu.  ? Avoid pre-made and processed foods. These tend to be higher in sodium, added sugar, and fat.  · Reduce your daily sodium intake. Most people with hypertension should eat less than 1,500 mg of sodium a day.  Lifestyle    · Work with your health care provider to maintain a healthy body weight or to lose weight. Ask what an ideal weight is for you.  · Get at least 30 minutes of exercise that causes your heart to beat faster (aerobic exercise) most days of the week. Activities may include walking, swimming, or biking.  · Include exercise to strengthen your muscles (resistance exercise), such as weight lifting, as part of your weekly exercise routine. Try to do these types of exercises for 30 minutes at least 3 days a week.  · Do not use any products that contain nicotine or tobacco, such as cigarettes, e-cigarettes, and chewing tobacco. If you need help quitting, ask your health care  provider.  · Control any long-term (chronic) conditions you have, such as high cholesterol or diabetes.  · Identify your sources of stress and find ways to manage stress. This may include meditation, deep breathing, or making time for fun activities.  Alcohol use  · Do not drink alcohol if:  ? Your health care provider tells you not to drink.  ? You are pregnant, may be pregnant, or are planning to become pregnant.  · If you drink alcohol:  ? Limit how much you use to:  § 0-1 drink a day for women.  § 0-2 drinks a day for men.  ? Be aware of how much alcohol is in your drink. In the [place] one drink equals one 12 oz bottle of beer (355 mL), one 5 oz glass of wine (148 mL), or one 1½ oz glass of hard liquor (44 mL).  Medicines  Your health care provider may prescribe medicine if lifestyle changes are not enough to get your blood pressure under control and if:  · Your systolic blood pressure is 130 or higher.  · Your diastolic blood pressure is 80 or higher.  Take medicines only as told by your health care provider. Follow the directions carefully. Blood pressure medicines must be taken as told by your health care provider. The medicine does not work as well when you skip doses. Skipping doses also puts you at risk for problems.  Monitoring  Before you monitor your blood pressure:  · Do not smoke, drink caffeinated beverages, or exercise within 30 minutes before taking a measurement.  · Use the bathroom and empty your bladder (urinate).  · Sit quietly for at least 5 minutes before taking measurements.  Monitor your blood pressure at home as told by your health care provider. To do this:  · Sit with your back straight and supported.  · Place your feet flat on the floor. Do not cross your legs.  · Support your arm on a flat surface, such as a table. Make sure your upper arm is at heart level.  · Each time you measure, take two or three readings one minute apart and record the results.  You may also need to have your  blood pressure checked regularly by your health care provider.  General information  · Talk with your health care provider about your diet, exercise habits, and other lifestyle factors that may be contributing to hypertension.  · Review all the medicines you take with your health care provider because there may be side effects or interactions.  · Keep all visits as told by your health care provider. Your health care provider can help you create and adjust your plan for managing your high blood pressure.  Where to find more information  · National Heart, Lung, and Blood [place]: www.nhlbi.nih.gov  · American Heart Association: www.heart.org  Contact a health care provider if:  · You think you are having a reaction to medicines you have taken.  · You have repeated (recurrent) headaches.  · You feel dizzy.  · You have swelling in your ankles.  · You have trouble with your vision.  Get help right away if:  · You develop a severe headache or confusion.  · You have unusual weakness or numbness, or you feel faint.  · You have severe pain in your chest or abdomen.  · You vomit repeatedly.  · You have trouble breathing.  These symptoms may represent a serious problem that is an emergency. Do not wait to see if the symptoms will go away. Get medical help right away. Call your local emergency services (911 in the [place] Do not drive yourself to the hospital.  Summary  · Hypertension is when the force of blood pumping through your arteries is too strong. If this condition is not controlled, it may put you at risk for serious complications.  · Your personal target blood pressure may vary depending on your medical conditions, your age, and other factors. For most people, a normal blood pressure is less than 120/80.  · Hypertension is managed by lifestyle changes, medicines, or both.  · Lifestyle changes to help manage hypertension include losing weight, eating a healthy, low-sodium diet, exercising more, stopping smoking, and  limiting alcohol.  This information is not intended to replace advice given to you by your health care provider. Make sure you discuss any questions you have with your health care provider.  Document Revised: 01/22/2021 Document Reviewed: 11/17/2020  Elsevier Patient Education © 2021 [employer]

## 2021-07-01 NOTE — PROGRESS NOTES
"Subjective    Heidi Franklin is a 40 y.o. female here for:  Chief Complaint   Patient presents with   • Migraine     Has had 2 doses of Aimovig but can no longer get the Nurtec because it is over $1000.        History per MA reviewed.    Aimovig has cut down migraines  Has noticed tylenol helps  Imitrex--side effects are worse than migraine.    UK changed flexeril to robaxin, gabapentin to Lyrica.     Doing pt for knees  Having some issues with low back pain.    Migraine   Associated symptoms include neck pain. Pertinent negatives include no fever.          The following portions of the patient's history were reviewed and updated as appropriate: allergies, current medications, past family history, past medical history, past social history, past surgical history and problem list.    Review of Systems   Constitutional: Negative for fever.   Musculoskeletal: Positive for arthralgias, gait problem, joint swelling (knees) and neck pain.   Psychiatric/Behavioral: Positive for stress.         Objective   Visit Vitals  /94   Pulse 91   Temp 97.7 °F (36.5 °C) (Temporal)   Resp 18   Ht 165.1 cm (65\")   Wt 96.8 kg (213 lb 8 oz)   SpO2 100%   BMI 35.53 kg/m²       Physical Exam  General Appearance:  awake, alert, oriented, in no acute distress  Eyes:  No gross abnormalities. and Sclera nonicteric  Lungs:  Normal expansion.  Clear to auscultation.  No rales, rhonchi, or wheezing.  Heart:  Heart sounds are normal.  Regular rate and rhythm without murmur, gallop or rub.  Neurologic:  negative findings: speech normal, mental status intact, A&O x 3  Psych exam:alert,oriented, in NAD with a full range of affect, normal behavior and no psychotic features    For medical decision making review of the following was required:  Lab Results   Component Value Date    WBC 8.52 11/09/2020    HGB 14.3 11/09/2020    HCT 42.3 11/09/2020    MCV 94.2 11/09/2020     11/09/2020     Lab Results   Component Value Date    BUN 12 11/09/2020 "    CREATININE 0.67 11/09/2020    EGFRIFNONA 97 11/09/2020    EGFRIFAFRI 117 11/09/2020    BCR 17.9 11/09/2020    K 5.0 11/09/2020    CO2 28.8 11/09/2020    CALCIUM 9.2 11/09/2020    PROTENTOTREF 6.6 11/09/2020    ALBUMIN 4.20 11/09/2020    LABIL2 1.8 11/09/2020    AST 13 11/09/2020    ALT 14 11/09/2020           Assessment/Plan     Problem List Items Addressed This Visit        Musculoskeletal and Injuries    Chronic neck pain       Neuro    Headache, migraine - Primary    Relevant Medications    ubrogepant (UBRELVY) 50 MG tablet    Rimegepant Sulfate (Nurtec) 75 MG tablet dispersible tablet    Cervical radiculopathy      Other Visit Diagnoses     Chronic pain of both knees        continue physical therapy, NSAIDs    Elevated blood pressure reading              · YSABEL reviewed and appropriate.  Follow up with  regarding cervical spine issues. Continue NSAID, muscle relaxer.  · Insurance issues with migraine med. Maybe pharmacy didn't run right insurance? Resent abortive meds, not to take both. Noted Imitrex as intolerance--side effects worse than migraine itself.  · Blood pressure high today, likely related to stress, recheck next visit.     Return in about 4 weeks (around 7/29/2021) for Blood Pressure follow up.     Martine Evans MD

## 2021-07-02 ENCOUNTER — OFFICE VISIT (OUTPATIENT)
Dept: OBSTETRICS AND GYNECOLOGY | Facility: CLINIC | Age: 41
End: 2021-07-02

## 2021-07-02 ENCOUNTER — TREATMENT (OUTPATIENT)
Dept: PHYSICAL THERAPY | Facility: CLINIC | Age: 41
End: 2021-07-02

## 2021-07-02 VITALS
HEIGHT: 65 IN | WEIGHT: 212 LBS | SYSTOLIC BLOOD PRESSURE: 130 MMHG | BODY MASS INDEX: 35.32 KG/M2 | DIASTOLIC BLOOD PRESSURE: 70 MMHG

## 2021-07-02 DIAGNOSIS — Z12.4 SCREENING FOR MALIGNANT NEOPLASM OF CERVIX: ICD-10-CM

## 2021-07-02 DIAGNOSIS — M17.0 OSTEOARTHRITIS OF BOTH KNEES, UNSPECIFIED OSTEOARTHRITIS TYPE: Primary | ICD-10-CM

## 2021-07-02 DIAGNOSIS — G89.29 CHRONIC PAIN OF BOTH KNEES: ICD-10-CM

## 2021-07-02 DIAGNOSIS — Z01.419 ENCOUNTER FOR GYNECOLOGICAL EXAMINATION WITHOUT ABNORMAL FINDING: Primary | ICD-10-CM

## 2021-07-02 DIAGNOSIS — Z12.39 ENCOUNTER FOR BREAST CANCER SCREENING OTHER THAN MAMMOGRAM: ICD-10-CM

## 2021-07-02 DIAGNOSIS — M25.562 CHRONIC PAIN OF BOTH KNEES: ICD-10-CM

## 2021-07-02 DIAGNOSIS — M25.561 CHRONIC PAIN OF BOTH KNEES: ICD-10-CM

## 2021-07-02 PROCEDURE — 99396 PREV VISIT EST AGE 40-64: CPT | Performed by: OBSTETRICS & GYNECOLOGY

## 2021-07-02 PROCEDURE — 97140 MANUAL THERAPY 1/> REGIONS: CPT | Performed by: PHYSICAL THERAPIST

## 2021-07-02 PROCEDURE — 97530 THERAPEUTIC ACTIVITIES: CPT | Performed by: PHYSICAL THERAPIST

## 2021-07-02 PROCEDURE — 97110 THERAPEUTIC EXERCISES: CPT | Performed by: PHYSICAL THERAPIST

## 2021-07-02 NOTE — PROGRESS NOTES
Physical Therapy Daily Progress Note    Patient Information  Heidi Franklin  1980      Visit # : 3    Heidi Franklin reports 8/10 pain today at rest.  Patient reports that she was up a lot yesterday running errands and doing chores. She states that this has caused a lot of extra pain today. Patient reports that stiffness also continues to be an issue.        Objective Pt presents to PT today with no distress noted.     Patient with very mild tenderness to palpation in medial joint line of R knee.     Patient has medial joint line pain in B knees upon standing.       See Exercise, Manual, and Modality Logs for complete treatment.     Assessment/Plan  Patient tolerated session well with no increases in pain with exercises. Patient tolerated manual therapy well with mild increases in pain with sup/inf patellar glides. Patient encouraged to continue HEP at home. Patient with less tenderness in B knees.       Progress per Plan of Care  PT will continue to monitor patients signs and symptoms and progress patient as tolerated.     Visit Diagnoses:    ICD-10-CM ICD-9-CM   1. Osteoarthritis of both knees, unspecified osteoarthritis type  M17.0 715.96   2. Chronic pain of both knees  M25.561 719.46    M25.562 338.29    G89.29             Manual Therapy:    11     mins  23971;  Therapeutic Exercise:    19     mins  23824;     Neuromuscular Ro:        mins  52493;    Therapeutic Activity:     12     mins  42833;     Gait Training:           mins  99562;     Ultrasound:          mins  79459;    Electrical Stimulation:         mins  43125 ( );  Dry Needling          mins self-pay    Timed Treatment:   42   mins   Total Treatment:     50   mins          Nicole Flores PT  Physical Therapist

## 2021-07-02 NOTE — PROGRESS NOTES
Subjective   Chief Complaint   Patient presents with   • Gynecologic Exam     Heidi Franklin is a 40 y.o. year old .  No LMP recorded. Patient has had an implant.  She presents to be seen because of annual exam..   Doing well  Mirena replaced - minimal menses  Nathan PCP    OTHER COMPLAINTS:  Nothing else    The following portions of the patient's history were reviewed and updated as appropriate:  She  has a past medical history of Anxiety, Arthritis, Carpal tunnel syndrome, Cervical disc herniation (2016), Encounter for insertion of mirena IUD (2016), Encounter for insertion of mirena IUD (2016), Headache, History of Papanicolaou smear of cervix (10/20/2015), History of Papanicolaou smear of cervix (2016), Low back pain, and Scoliosis.  She does not have any pertinent problems on file.  She  has a past surgical history that includes Breast surgery (Right);  section; Interventional radiology procedure (N/A, 2017); Other surgical history; Spine surgery (2017, 2018); Breast biopsy; Breast lumpectomy; Spine surgery (2019); US Guided Cyst Aspiration Breast (N/A, 2020); US Guided Cyst Aspiration Breast (N/A, 2020); and US Guided Cyst Aspiration Breast (N/A, 2021).  Her family history includes Arthritis in her maternal aunt, maternal aunt, and maternal grandmother; Breast cancer in her maternal aunt and maternal aunt; COPD in her mother and another family member; Cancer in her mother; Drug abuse in her maternal aunt.  She  reports that she has been smoking. She has a 15.00 pack-year smoking history. She has never used smokeless tobacco. She reports that she does not drink alcohol and does not use drugs.  Current Outpatient Medications   Medication Sig Dispense Refill   • acetaminophen (TYLENOL) 500 MG tablet Take 500 mg by mouth Every 6 (Six) Hours As Needed for mild pain (1-3).     • amoxicillin (AMOXIL) 500 MG capsule Only takes  prior to dental cleaning and procedures.     • celecoxib (CeleBREX) 200 MG capsule Take 1 capsule by mouth 2 (Two) Times a Day. 180 capsule 3   • Cholecalciferol (Vitamin D3) 1.25 MG (80557 UT) capsule Take 1 capsule by mouth Every 7 (Seven) Days. Take vitamin D3 2000 IU daily when this rx completed  Indications: Vitamin D Deficiency 4 capsule 0   • diazePAM (VALIUM) 5 MG tablet TAKE 1 TABLET BY MOUTH THREE TIMES DAILY AS NEEDED FOR MUSCLE SPASMS 90 tablet 1   • DULoxetine (CYMBALTA) 60 MG capsule TAKE 1 CAPSULE BY MOUTH DAILY 90 capsule 3   • Erenumab-aooe (Aimovig) 70 MG/ML prefilled syringe Inject 1 mL under the skin into the appropriate area as directed Every 30 (Thirty) Days. 3 pen 4   • HYDROcodone-acetaminophen (NORCO)  MG per tablet Take 1 tablet by mouth Every 8 (Eight) Hours As Needed for Moderate Pain  or Severe Pain .     • Levonorgestrel (MIRENA, 52 MG, IU) by Intrauterine route.     • methocarbamol (ROBAXIN) 500 MG tablet Take 500 mg by mouth 4 (Four) Times a Day.     • pregabalin (LYRICA) 75 MG capsule Take 75 mg by mouth 2 (Two) Times a Day.     • Rimegepant Sulfate (Nurtec) 75 MG tablet dispersible tablet Take 1 tablet by mouth Daily As Needed (migraine). 8 tablet 11   • ubrogepant (UBRELVY) 50 MG tablet Take 1 tablet by mouth Daily As Needed (migraine). 8 tablet 3     Current Facility-Administered Medications   Medication Dose Route Frequency Provider Last Rate Last Admin   • levonorgestrel (MIRENA) 20 MCG/24HR IUD   Intrauterine Once John May MD         Current Outpatient Medications on File Prior to Visit   Medication Sig   • acetaminophen (TYLENOL) 500 MG tablet Take 500 mg by mouth Every 6 (Six) Hours As Needed for mild pain (1-3).   • amoxicillin (AMOXIL) 500 MG capsule Only takes prior to dental cleaning and procedures.   • celecoxib (CeleBREX) 200 MG capsule Take 1 capsule by mouth 2 (Two) Times a Day.   • Cholecalciferol (Vitamin D3) 1.25 MG (03731 UT) capsule Take 1  "capsule by mouth Every 7 (Seven) Days. Take vitamin D3 2000 IU daily when this rx completed  Indications: Vitamin D Deficiency   • diazePAM (VALIUM) 5 MG tablet TAKE 1 TABLET BY MOUTH THREE TIMES DAILY AS NEEDED FOR MUSCLE SPASMS   • DULoxetine (CYMBALTA) 60 MG capsule TAKE 1 CAPSULE BY MOUTH DAILY   • Erenumab-aooe (Aimovig) 70 MG/ML prefilled syringe Inject 1 mL under the skin into the appropriate area as directed Every 30 (Thirty) Days.   • HYDROcodone-acetaminophen (NORCO)  MG per tablet Take 1 tablet by mouth Every 8 (Eight) Hours As Needed for Moderate Pain  or Severe Pain .   • Levonorgestrel (MIRENA, 52 MG, IU) by Intrauterine route.   • methocarbamol (ROBAXIN) 500 MG tablet Take 500 mg by mouth 4 (Four) Times a Day.   • pregabalin (LYRICA) 75 MG capsule Take 75 mg by mouth 2 (Two) Times a Day.   • Rimegepant Sulfate (Nurtec) 75 MG tablet dispersible tablet Take 1 tablet by mouth Daily As Needed (migraine).   • ubrogepant (UBRELVY) 50 MG tablet Take 1 tablet by mouth Daily As Needed (migraine).     Current Facility-Administered Medications on File Prior to Visit   Medication   • levonorgestrel (MIRENA) 20 MCG/24HR IUD     She is allergic to citalopram.    Social History    Tobacco Use      Smoking status: Current Every Day Smoker        Packs/day: 1.00        Years: 15.00        Pack years: 15      Smokeless tobacco: Never Used    Review of Systems  Consitutional POS: nothing reported    NEG: anorexia or night sweats   Gastointestinal POS: nothing reported    NEG: bloating, change in bowel habits, melena or reflux symptoms   Genitourinary POS: nothing reported    NEG: dysuria or hematuria   Integument POS: nothing reported    NEG: moles that are changing in size, shape, color or rashes   Breast POS: nothing reported    NEG: persistent breast lump, skin dimpling or nipple discharge         Respiratory: negative  Cardiovascular: negative          Objective   /70   Ht 165.1 cm (65\")   Wt 96.2 kg " (212 lb)   BMI 35.28 kg/m²     General:  well developed; well nourished  no acute distress   Skin:  No suspicious lesions seen   Thyroid: normal to inspection and palpation   Lungs:  breathing is unlabored  clear to auscultation bilaterally   Heart:  regular rate and rhythm, S1, S2 normal, no murmur, click, rub or gallop   Breasts:  Examined in supine position  Symmetric without masses or skin dimpling  Nipples normal without inversion, lesions or discharge  There are no palpable axillary nodes   Abdomen: soft, non-tender; no masses  no umbilical or inguinal hernias are present  no hepato-splenomegaly   Pelvis: Clinical staff was present for exam  External genitalia:  normal appearance of the external genitalia including Bartholin's and [place]'s glands.  :  urethral meatus normal;  Vaginal:  normal pink mucosa without prolapse or lesions.  Cervix:  normal appearance.  Uterus:  normal size, shape and consistency.  Adnexa:  normal bimanual exam of the adnexa.  Rectal:  digital rectal exam not performed; anus visually normal appearing.     Psychiatric: Alert and oriented ×3, mood and affect appropriate  HEENT: Atraumatic, normocephalic, normal scleral icterus  Extremities: 2+ pulses bilaterally, no edema      Lab Review   CBC, CMP and TSH    Imaging   Mammogram report   Mammo Diagnostic Digital Tomosynthesis Bilateral With CAD (05/05/2021 11:15)       Assessment   1. PE WNL  2. Strings visible     Plan   1. PAP done  2. MMG WNL UTD  3. Diet/exercise    No orders of the defined types were placed in this encounter.         This note was electronically signed.      July 2, 2021

## 2021-07-06 ENCOUNTER — TREATMENT (OUTPATIENT)
Dept: PHYSICAL THERAPY | Facility: CLINIC | Age: 41
End: 2021-07-06

## 2021-07-06 DIAGNOSIS — M17.0 OSTEOARTHRITIS OF BOTH KNEES, UNSPECIFIED OSTEOARTHRITIS TYPE: Primary | ICD-10-CM

## 2021-07-06 DIAGNOSIS — M25.561 CHRONIC PAIN OF BOTH KNEES: ICD-10-CM

## 2021-07-06 DIAGNOSIS — G89.29 CHRONIC PAIN OF BOTH KNEES: ICD-10-CM

## 2021-07-06 DIAGNOSIS — M25.562 CHRONIC PAIN OF BOTH KNEES: ICD-10-CM

## 2021-07-06 PROCEDURE — 97140 MANUAL THERAPY 1/> REGIONS: CPT | Performed by: PHYSICAL THERAPIST

## 2021-07-06 PROCEDURE — 97530 THERAPEUTIC ACTIVITIES: CPT | Performed by: PHYSICAL THERAPIST

## 2021-07-06 PROCEDURE — 97110 THERAPEUTIC EXERCISES: CPT | Performed by: PHYSICAL THERAPIST

## 2021-07-06 NOTE — PROGRESS NOTES
Physical Therapy Daily Progress Note    Patient Information  Heidi Franklin  1980      Visit # : 4    Heidi Franklin reports 8/10 pain today at rest.  Patient reports that she had a really bad day yesterday with her R knee. She states that she really did not do anything extra she just had a lot of pain. Patient reports that her knees continues to be stiff at times.         Objective Pt presents to PT today with no distress noted.     Patient with moderate tenderness along medial joint line of R knee.     Patient with mild tenderness at inferior and superior patella of L knee.       See Exercise, Manual, and Modality Logs for complete treatment.     Assessment/Plan  Patient tolerated session well with no increases in knee pain with exercises. Patient continues to have tenderness to palpation in B knees. Patient encouraged to continue HEP to assist with symptom management. Patient encouraged to avoid activities that increase knee pain.       Progress per Plan of Care  PT will continue to monitor patients signs and symptoms and progress patient as tolerated.     Visit Diagnoses:    ICD-10-CM ICD-9-CM   1. Osteoarthritis of both knees, unspecified osteoarthritis type  M17.0 715.96   2. Chronic pain of both knees  M25.561 719.46    M25.562 338.29    G89.29             Manual Therapy:    10     mins  56392;  Therapeutic Exercise:    19     mins  08317;     Neuromuscular Ro:        mins  54448;    Therapeutic Activity:     14     mins  88793;     Gait Training:           mins  20999;     Ultrasound:          mins  44966;    Electrical Stimulation:         mins  29502 ( );  Dry Needling          mins self-pay    Timed Treatment:   43   mins   Total Treatment:     53   mins          Nicole Flores PT  Physical Therapist

## 2021-07-09 ENCOUNTER — TREATMENT (OUTPATIENT)
Dept: PHYSICAL THERAPY | Facility: CLINIC | Age: 41
End: 2021-07-09

## 2021-07-09 DIAGNOSIS — M25.561 CHRONIC PAIN OF BOTH KNEES: ICD-10-CM

## 2021-07-09 DIAGNOSIS — G89.29 CHRONIC PAIN OF BOTH KNEES: ICD-10-CM

## 2021-07-09 DIAGNOSIS — M25.562 CHRONIC PAIN OF BOTH KNEES: ICD-10-CM

## 2021-07-09 DIAGNOSIS — M17.0 OSTEOARTHRITIS OF BOTH KNEES, UNSPECIFIED OSTEOARTHRITIS TYPE: Primary | ICD-10-CM

## 2021-07-09 PROCEDURE — 97530 THERAPEUTIC ACTIVITIES: CPT | Performed by: PHYSICAL THERAPIST

## 2021-07-09 PROCEDURE — 97110 THERAPEUTIC EXERCISES: CPT | Performed by: PHYSICAL THERAPIST

## 2021-07-09 PROCEDURE — 97140 MANUAL THERAPY 1/> REGIONS: CPT | Performed by: PHYSICAL THERAPIST

## 2021-07-09 NOTE — PROGRESS NOTES
Physical Therapy Daily Progress Note    Patient Information  Heidi Franklin  1980      Visit # : 5    Heidi Franklin reports 7/10 pain today at rest.  Patient reports that she has been really busy this week and had to walk a lot. Patient reports that her knees are really sore from all the walking yesterday and they both hurt on the inside of her leg. Patient reports that the R knee is worse than the L.         Objective Pt presents to PT today with no distress noted.     Patient with moderate tenderness in medial joint line of B knees.     Patient with reports of pain under L patella today.     See Exercise, Manual, and Modality Logs for complete treatment.     Assessment/Plan  Patient tolerated session well with no increase in pain with exercises. Patient tolerated manual therapy well with reports of reduced pain under her L patella. Patient continues to have increased pain in B knees. Patient encouraged to continue HEP and utilize ice as needed.       Progress per Plan of Care  PT will continue to monitor patients signs and symptoms and progress patient as tolerated.    Visit Diagnoses:    ICD-10-CM ICD-9-CM   1. Osteoarthritis of both knees, unspecified osteoarthritis type  M17.0 715.96   2. Chronic pain of both knees  M25.561 719.46    M25.562 338.29    G89.29             Manual Therapy:    11     mins  71933;  Therapeutic Exercise:    18     mins  78986;     Neuromuscular Ro:        mins  52368;    Therapeutic Activity:     13     mins  59821;     Gait Training:           mins  98310;     Ultrasound:          mins  41345;    Electrical Stimulation:         mins  25003 ( );  Dry Needling          mins self-pay    Timed Treatment:   42   mins   Total Treatment:     50   mins          Nicole Flores PT  Physical Therapist

## 2021-07-12 ENCOUNTER — PRIOR AUTHORIZATION (OUTPATIENT)
Dept: INTERNAL MEDICINE | Facility: CLINIC | Age: 41
End: 2021-07-12

## 2021-07-12 ENCOUNTER — TREATMENT (OUTPATIENT)
Dept: PHYSICAL THERAPY | Facility: CLINIC | Age: 41
End: 2021-07-12

## 2021-07-12 DIAGNOSIS — M25.561 CHRONIC PAIN OF BOTH KNEES: ICD-10-CM

## 2021-07-12 DIAGNOSIS — M17.0 OSTEOARTHRITIS OF BOTH KNEES, UNSPECIFIED OSTEOARTHRITIS TYPE: Primary | ICD-10-CM

## 2021-07-12 DIAGNOSIS — M25.562 CHRONIC PAIN OF BOTH KNEES: ICD-10-CM

## 2021-07-12 DIAGNOSIS — G89.29 CHRONIC PAIN OF BOTH KNEES: ICD-10-CM

## 2021-07-12 PROCEDURE — 97110 THERAPEUTIC EXERCISES: CPT | Performed by: PHYSICAL THERAPIST

## 2021-07-12 PROCEDURE — 97530 THERAPEUTIC ACTIVITIES: CPT | Performed by: PHYSICAL THERAPIST

## 2021-07-12 PROCEDURE — 97140 MANUAL THERAPY 1/> REGIONS: CPT | Performed by: PHYSICAL THERAPIST

## 2021-07-12 NOTE — PROGRESS NOTES
Physical Therapy Daily Progress Note    Patient Information  Heidi Franklin  1980      Visit # : 6    Heidi Franklin reports 7/10 pain today at rest.  Patient reports that her knee pain continues to be about the same. She states that the more active she is the worse her pain is in her knees. Patient reports that her knees pop constantly.         Objective Pt presents to PT today with no distress noted.     Patient with mild tenderness to palpation in medial joint line of B knees.    Patient with mild tenderness in superior patella of R knee.       See Exercise, Manual, and Modality Logs for complete treatment.     Assessment/Plan  Patient tolerated session well with no reports of pain with exercises. Patient continues to tolerate manual therapy well. Patient continues to have increased pain in B knees with weightbearing. Patient encouraged to continue HEP to assist with symptom management. Patient beginning to demonstrate improved endurance.       Progress per Plan of Care  PT will continue to monitor patients signs and symptoms and progress patient as tolerated.    Visit Diagnoses:    ICD-10-CM ICD-9-CM   1. Osteoarthritis of both knees, unspecified osteoarthritis type  M17.0 715.96   2. Chronic pain of both knees  M25.561 719.46    M25.562 338.29    G89.29             Manual Therapy:    12     mins  01267;  Therapeutic Exercise:    18     mins  49725;     Neuromuscular Ro:        mins  53796;    Therapeutic Activity:     14     mins  63919;     Gait Training:           mins  55775;     Ultrasound:          mins  37140;    Electrical Stimulation:         mins  67317 ( );  Dry Needling          mins self-pay    Timed Treatment:   44   mins   Total Treatment:     52   mins          Nicole Flores PT  Physical Therapist

## 2021-07-21 ENCOUNTER — TREATMENT (OUTPATIENT)
Dept: PHYSICAL THERAPY | Facility: CLINIC | Age: 41
End: 2021-07-21

## 2021-07-21 DIAGNOSIS — M25.562 CHRONIC PAIN OF BOTH KNEES: ICD-10-CM

## 2021-07-21 DIAGNOSIS — M17.0 OSTEOARTHRITIS OF BOTH KNEES, UNSPECIFIED OSTEOARTHRITIS TYPE: Primary | ICD-10-CM

## 2021-07-21 DIAGNOSIS — Z01.419 ENCOUNTER FOR GYNECOLOGICAL EXAMINATION WITHOUT ABNORMAL FINDING: ICD-10-CM

## 2021-07-21 DIAGNOSIS — Z12.4 SCREENING FOR MALIGNANT NEOPLASM OF CERVIX: ICD-10-CM

## 2021-07-21 DIAGNOSIS — G89.29 CHRONIC PAIN OF BOTH KNEES: ICD-10-CM

## 2021-07-21 DIAGNOSIS — M25.561 CHRONIC PAIN OF BOTH KNEES: ICD-10-CM

## 2021-07-21 PROCEDURE — 97110 THERAPEUTIC EXERCISES: CPT | Performed by: PHYSICAL THERAPIST

## 2021-07-21 PROCEDURE — 97140 MANUAL THERAPY 1/> REGIONS: CPT | Performed by: PHYSICAL THERAPIST

## 2021-07-21 PROCEDURE — 97530 THERAPEUTIC ACTIVITIES: CPT | Performed by: PHYSICAL THERAPIST

## 2021-07-21 NOTE — PROGRESS NOTES
Physical Therapy Daily Progress Note    Patient Information  Heidi Franklin  1980      Visit # : 7    Heidi Franklin reports 9/10 pain today at rest.  Patient reports that she has had a lot of pain in her knees over the weekend. She states that she did not do much because of this pain. Patient reports that she was barely able to get off the couch all weekend and she continues to have this issue because of the intense knee pain.         Objective Pt presents to PT today with no distress noted.     Patient with moderate tenderness in medial joint line of B knees.     Patient with mild tenderness to palpation in superior patella of B knees.       See Exercise, Manual, and Modality Logs for complete treatment.     Assessment/Plan  Patient tolerated session well with moderate increases in pain with exercises. Pain was mildly improved as session progressed. Patient with mild reduction of pain following manual therapy. Patient encouraged to remain active and perform HEP as prescribed. Patient also encouraged to ice B knees frequently to assist with symptom management.       Progress per Plan of Care  PT will continue to monitor patients signs and symptoms and progress patient as tolerated.    Visit Diagnoses:    ICD-10-CM ICD-9-CM   1. Osteoarthritis of both knees, unspecified osteoarthritis type  M17.0 715.96   2. Chronic pain of both knees  M25.561 719.46    M25.562 338.29    G89.29             Manual Therapy:    16     mins  66429;  Therapeutic Exercise:    17     mins  58452;     Neuromuscular Ro:        mins  51768;    Therapeutic Activity:     13     mins  57407;     Gait Training:           mins  37545;     Ultrasound:          mins  54291;    Electrical Stimulation:         mins  63327 ( );  Dry Needling          mins self-pay    Timed Treatment:   46   mins   Total Treatment:     56   mins          Nicole Flores PT  Physical Therapist

## 2021-07-26 ENCOUNTER — TREATMENT (OUTPATIENT)
Dept: PHYSICAL THERAPY | Facility: CLINIC | Age: 41
End: 2021-07-26

## 2021-07-26 DIAGNOSIS — M17.0 OSTEOARTHRITIS OF BOTH KNEES, UNSPECIFIED OSTEOARTHRITIS TYPE: Primary | ICD-10-CM

## 2021-07-26 DIAGNOSIS — M25.561 CHRONIC PAIN OF BOTH KNEES: ICD-10-CM

## 2021-07-26 DIAGNOSIS — G89.29 CHRONIC PAIN OF BOTH KNEES: ICD-10-CM

## 2021-07-26 DIAGNOSIS — M25.562 CHRONIC PAIN OF BOTH KNEES: ICD-10-CM

## 2021-07-26 PROCEDURE — 97530 THERAPEUTIC ACTIVITIES: CPT | Performed by: PHYSICAL THERAPIST

## 2021-07-26 PROCEDURE — 97140 MANUAL THERAPY 1/> REGIONS: CPT | Performed by: PHYSICAL THERAPIST

## 2021-07-26 PROCEDURE — 97110 THERAPEUTIC EXERCISES: CPT | Performed by: PHYSICAL THERAPIST

## 2021-07-26 NOTE — PROGRESS NOTES
Physical Therapy Daily Progress Note    Patient Information  Heidi Franklin  1980      Visit # : 8    Heidi Franklin reports 5/10 pain today at rest.  Patient reports that her knee pain continues to fluctuate. She states that her pain is better than it was when she was here last. She states that she continues to have a hard time getting off her couch at home at times.         Objective Pt presents to PT today with no distress noted.     Patient with mild tenderness in B medial joint lines.     Patient with very mild tenderness in L quad tendon.       See Exercise, Manual, and Modality Logs for complete treatment.     Assessment/Plan  Patient tolerated session well with no reports of increased pain. Patient continues to tolerate manual therapy well with reports of reduced pain following STM to quad tendon and quad musculature. Patient encouraged to continue HEP and utilize ice as needed.       Progress per Plan of Care  PT will continue to monitor patients signs and symptoms and progress patient as tolerated.    Visit Diagnoses:    ICD-10-CM ICD-9-CM   1. Osteoarthritis of both knees, unspecified osteoarthritis type  M17.0 715.96   2. Chronic pain of both knees  M25.561 719.46    M25.562 338.29    G89.29             Manual Therapy:    15     mins  92198;  Therapeutic Exercise:    18     mins  31204;     Neuromuscular Ro:        mins  57420;    Therapeutic Activity:     12     mins  69077;     Gait Training:           mins  01121;     Ultrasound:          mins  79364;    Electrical Stimulation:         mins  94079 ( );  Dry Needling          mins self-pay    Timed Treatment:   45   mins   Total Treatment:     55   mins          Nicole Flores, PT  Physical Therapist

## 2021-07-28 ENCOUNTER — TREATMENT (OUTPATIENT)
Dept: PHYSICAL THERAPY | Facility: CLINIC | Age: 41
End: 2021-07-28

## 2021-07-28 DIAGNOSIS — G89.29 CHRONIC PAIN OF BOTH KNEES: ICD-10-CM

## 2021-07-28 DIAGNOSIS — M17.0 OSTEOARTHRITIS OF BOTH KNEES, UNSPECIFIED OSTEOARTHRITIS TYPE: Primary | ICD-10-CM

## 2021-07-28 DIAGNOSIS — M25.562 CHRONIC PAIN OF BOTH KNEES: ICD-10-CM

## 2021-07-28 DIAGNOSIS — M25.561 CHRONIC PAIN OF BOTH KNEES: ICD-10-CM

## 2021-07-28 PROCEDURE — 97140 MANUAL THERAPY 1/> REGIONS: CPT | Performed by: PHYSICAL THERAPIST

## 2021-07-28 PROCEDURE — 97110 THERAPEUTIC EXERCISES: CPT | Performed by: PHYSICAL THERAPIST

## 2021-07-28 PROCEDURE — 97530 THERAPEUTIC ACTIVITIES: CPT | Performed by: PHYSICAL THERAPIST

## 2021-07-28 NOTE — PROGRESS NOTES
Physical Therapy Daily Progress Note    Patient Information  Heidi Franklin  1980      Visit # : 9    Heidi Franklin reports 4/10 pain today at rest.  Patient reports that she has had a couple of good days with her knees. She states that they continue to pop and crack but the pain is less. She states that getting up and down from the couch is getting a little easier.         Objective Pt presents to PT today with no distress noted.     Patient with no tenderness noted in either medial joint line.     Patient has very mild tenderness in B quad tendons.      See Exercise, Manual, and Modality Logs for complete treatment.     Assessment/Plan  Patient tolerated session well with no increases in knee pain with exercises. Patient tolerated manual therapy well and had reports of reduced tenderness in B quad tendons. Patient is demonstrating improved strength in B LE. Patient encouraged to continue HEP and utilize ice as needed.       Progress per Plan of Care  PT will continue to monitor patients signs and symptoms and progress patient as tolerated.     Visit Diagnoses:    ICD-10-CM ICD-9-CM   1. Osteoarthritis of both knees, unspecified osteoarthritis type  M17.0 715.96   2. Chronic pain of both knees  M25.561 719.46    M25.562 338.29    G89.29             Manual Therapy:    17     mins  35657;  Therapeutic Exercise:    19     mins  44714;     Neuromuscular Ro:        mins  64162;    Therapeutic Activity:     13     mins  74034;     Gait Training:           mins  68471;     Ultrasound:          mins  62039;    Electrical Stimulation:         mins  05864 ( );  Dry Needling          mins self-pay    Timed Treatment:   49   mins   Total Treatment:     55   mins          Nicole Flores PT  Physical Therapist

## 2021-07-29 ENCOUNTER — OFFICE VISIT (OUTPATIENT)
Dept: INTERNAL MEDICINE | Facility: CLINIC | Age: 41
End: 2021-07-29

## 2021-07-29 VITALS
HEART RATE: 92 BPM | HEIGHT: 65 IN | TEMPERATURE: 98.1 F | WEIGHT: 207 LBS | RESPIRATION RATE: 17 BRPM | BODY MASS INDEX: 34.49 KG/M2 | OXYGEN SATURATION: 100 % | DIASTOLIC BLOOD PRESSURE: 90 MMHG | SYSTOLIC BLOOD PRESSURE: 138 MMHG

## 2021-07-29 DIAGNOSIS — G89.29 CHRONIC PAIN OF BOTH KNEES: ICD-10-CM

## 2021-07-29 DIAGNOSIS — N39.3 STRESS INCONTINENCE OF URINE: ICD-10-CM

## 2021-07-29 DIAGNOSIS — G43.909 MIGRAINE WITHOUT STATUS MIGRAINOSUS, NOT INTRACTABLE, UNSPECIFIED MIGRAINE TYPE: ICD-10-CM

## 2021-07-29 DIAGNOSIS — M54.12 CERVICAL RADICULOPATHY: ICD-10-CM

## 2021-07-29 DIAGNOSIS — M25.561 CHRONIC PAIN OF BOTH KNEES: ICD-10-CM

## 2021-07-29 DIAGNOSIS — B35.4 TINEA CORPORIS: ICD-10-CM

## 2021-07-29 DIAGNOSIS — M25.562 CHRONIC PAIN OF BOTH KNEES: ICD-10-CM

## 2021-07-29 DIAGNOSIS — I10 ESSENTIAL HYPERTENSION: Primary | ICD-10-CM

## 2021-07-29 PROCEDURE — 99214 OFFICE O/P EST MOD 30 MIN: CPT | Performed by: FAMILY MEDICINE

## 2021-07-29 RX ORDER — KETOCONAZOLE 20 MG/ML
SHAMPOO TOPICAL
Qty: 120 ML | Refills: 1 | Status: SHIPPED | OUTPATIENT
Start: 2021-07-29

## 2021-07-29 RX ORDER — CLOTRIMAZOLE 1 %
CREAM (GRAM) TOPICAL 2 TIMES DAILY
Qty: 60 G | Refills: 3 | Status: SHIPPED | OUTPATIENT
Start: 2021-07-29

## 2021-07-29 RX ORDER — NEBIVOLOL 2.5 MG/1
2.5 TABLET ORAL DAILY
Qty: 90 TABLET | Refills: 3 | Status: SHIPPED | OUTPATIENT
Start: 2021-07-29 | End: 2021-09-18

## 2021-07-29 NOTE — PROGRESS NOTES
"Subjective    Heidi Franklin is a 40 y.o. female here for:  Chief Complaint   Patient presents with   • Hypertension       History per MA reviewed.    Has stress still from disability hearing, has to wait 30-90 days for answer on disability    Good news: bones fusing  Going to see about carpal tunnel, has been wearing braces, got cortisone shot in right wrist Friday (could not move wrist x 3 days, regrets it)    Has started PT for knees  Going x 4-5 weeks, \"not doing anything\"  PT has mentioned \"knee replacement\" to her for arthritis, maybe see rheumatologist  Ortho did test for rheumatoid arthritis and was negative    Having a lot of bladder leakage, makes her worry about her low back  History of bulging discs  Has to change clothes  Sometimes from moving wrong way, or coughing  Has been doing kegels already   Does not have urinary incontinence w/o awareness.    Patient has tried working after each surgery and it hasn't worked out.            The following portions of the patient's history were reviewed and updated as appropriate: allergies, current medications, past family history, past medical history, past social history, past surgical history and problem list.    Review of Systems   Constitutional: Positive for fatigue. Negative for fever.   Musculoskeletal: Positive for arthralgias and back pain.   Skin: Positive for rash.   Neurological: Positive for numbness.   Psychiatric/Behavioral: Positive for depressed mood (\"some days i get so down\") and stress.         Objective   Visit Vitals  /90   Pulse 92   Temp 98.1 °F (36.7 °C)   Resp 17   Ht 165.1 cm (65\")   Wt 93.9 kg (207 lb)   SpO2 100%   BMI 34.45 kg/m²       Physical Exam  Vitals and nursing note reviewed.   Constitutional:       General: She is not in acute distress.     Appearance: Normal appearance. She is well-developed and well-groomed. She is not ill-appearing, toxic-appearing or diaphoretic.      Interventions: Face mask in place.   HENT:      " Head: Normocephalic and atraumatic.      Right Ear: Hearing normal.      Left Ear: Hearing normal.   Eyes:      General: Lids are normal. No scleral icterus.     Extraocular Movements: Extraocular movements intact.   Neck:      Trachea: Phonation normal.   Pulmonary:      Effort: Pulmonary effort is normal.   Musculoskeletal:      Cervical back: Neck supple.   Skin:     Coloration: Skin is not jaundiced or pale.      Findings: Rash (annular erythematous lesions on upper back, one on chest) present.   Neurological:      General: No focal deficit present.      Mental Status: She is alert and oriented to person, place, and time.      Motor: Motor function is intact.   Psychiatric:         Attention and Perception: Attention and perception normal.         Mood and Affect: Mood and affect normal.         Speech: Speech normal.         Behavior: Behavior normal. Behavior is cooperative.         Thought Content: Thought content normal.         Cognition and Memory: Cognition and memory normal.         Judgment: Judgment normal.         For medical decision making review of the following was required:  Lab Results   Component Value Date    WBC 8.52 11/09/2020    HGB 14.3 11/09/2020    HCT 42.3 11/09/2020    MCV 94.2 11/09/2020     11/09/2020     Lab Results   Component Value Date    BUN 12 11/09/2020    CREATININE 0.67 11/09/2020    EGFRIFNONA 97 11/09/2020    EGFRIFAFRI 117 11/09/2020    BCR 17.9 11/09/2020    K 5.0 11/09/2020    CO2 28.8 11/09/2020    CALCIUM 9.2 11/09/2020    PROTENTOTREF 6.6 11/09/2020    ALBUMIN 4.20 11/09/2020    LABIL2 1.8 11/09/2020    AST 13 11/09/2020    ALT 14 11/09/2020         Assessment/Plan     Problem List Items Addressed This Visit        Cardiac and Vasculature    Essential hypertension - Primary    Relevant Medications    nebivolol (Bystolic) 2.5 MG tablet       Musculoskeletal and Injuries    Chronic pain of both knees    Current Assessment & Plan     Follow up with specialist,  continue PT            Neuro    Headache, migraine    Current Assessment & Plan     addition of beta blocker may help with migraine prevention           Relevant Medications    nebivolol (Bystolic) 2.5 MG tablet    Cervical radiculopathy    Current Assessment & Plan     Follow up with specialists           Other Visit Diagnoses     Stress incontinence of urine        Relevant Orders    Ambulatory Referral to Physical Therapy Pelvic Floor    Tinea corporis        Relevant Medications    ketoconazole (NIZORAL) 2 % shampoo    clotrimazole (LOTRIMIN) 1 % cream          ·     Return in about 2 weeks (around 8/12/2021) for Blood Pressure follow up.     Martine Evans MD

## 2021-08-04 ENCOUNTER — TREATMENT (OUTPATIENT)
Dept: PHYSICAL THERAPY | Facility: CLINIC | Age: 41
End: 2021-08-04

## 2021-08-04 DIAGNOSIS — G89.29 CHRONIC PAIN OF BOTH KNEES: ICD-10-CM

## 2021-08-04 DIAGNOSIS — M25.561 CHRONIC PAIN OF BOTH KNEES: ICD-10-CM

## 2021-08-04 DIAGNOSIS — M25.562 CHRONIC PAIN OF BOTH KNEES: ICD-10-CM

## 2021-08-04 DIAGNOSIS — M17.0 OSTEOARTHRITIS OF BOTH KNEES, UNSPECIFIED OSTEOARTHRITIS TYPE: Primary | ICD-10-CM

## 2021-08-04 PROCEDURE — 97530 THERAPEUTIC ACTIVITIES: CPT | Performed by: PHYSICAL THERAPIST

## 2021-08-04 PROCEDURE — 97110 THERAPEUTIC EXERCISES: CPT | Performed by: PHYSICAL THERAPIST

## 2021-08-04 PROCEDURE — 97140 MANUAL THERAPY 1/> REGIONS: CPT | Performed by: PHYSICAL THERAPIST

## 2021-08-04 NOTE — PROGRESS NOTES
Physical Therapy Daily Progress Note    Patient Information  Heidi Franklin  1980      Visit # : 10    Heidi Franklin reports 4/10 pain today at rest.  Patient reports that her knees have been doing better. She states that she continues to have popping and grinding in both knees. Patient reports that she has been able to walk a little more with less pain lately.         Objective Pt presents to PT today with no distress noted.     Patient with mild tenderness in R medial joint line.     No tenderness noted in quad tendons today.       See Exercise, Manual, and Modality Logs for complete treatment.     Assessment/Plan  Patient tolerated session well with no increases in knee pain with exercises. Patient tolerated manual therapy well. She is demonstrating improved strength and endurance in B LE. She continues to have knee pain at home when she is active but she is having less reports of pain/difficulty completing lighter ADL's. Patient encouraged to continue HEP and utilize ice as needed.        Progress per Plan of Care  PT will continue to monitor patients signs and symptoms and progress patient as tolerated.     Visit Diagnoses:    ICD-10-CM ICD-9-CM   1. Osteoarthritis of both knees, unspecified osteoarthritis type  M17.0 715.96   2. Chronic pain of both knees  M25.561 719.46    M25.562 338.29    G89.29             Manual Therapy:    16     mins  20648;  Therapeutic Exercise:    18     mins  74238;     Neuromuscular Ro:        mins  95453;    Therapeutic Activity:     12     mins  09781;     Gait Training:           mins  45706;     Ultrasound:          mins  84923;    Electrical Stimulation:         mins  50200 ( );  Dry Needling          mins self-pay    Timed Treatment:   46   mins   Total Treatment:     56   mins          Nicole Flores PT  Physical Therapist

## 2021-08-09 ENCOUNTER — TELEPHONE (OUTPATIENT)
Dept: PHYSICAL THERAPY | Facility: CLINIC | Age: 41
End: 2021-08-09

## 2021-08-09 NOTE — TELEPHONE ENCOUNTER
PATIENT STATES THERAPY ISN'T HELPING HER.  CXL'D REMAINING APPTS.  SHE IS GOING TO CALL HER ORTHO DOC.

## 2021-09-08 ENCOUNTER — PRIOR AUTHORIZATION (OUTPATIENT)
Dept: INTERNAL MEDICINE | Facility: CLINIC | Age: 41
End: 2021-09-08

## 2021-09-18 ENCOUNTER — OFFICE VISIT (OUTPATIENT)
Dept: INTERNAL MEDICINE | Facility: CLINIC | Age: 41
End: 2021-09-18

## 2021-09-18 VITALS
SYSTOLIC BLOOD PRESSURE: 146 MMHG | OXYGEN SATURATION: 97 % | DIASTOLIC BLOOD PRESSURE: 104 MMHG | WEIGHT: 212.38 LBS | HEART RATE: 97 BPM | RESPIRATION RATE: 18 BRPM | BODY MASS INDEX: 35.38 KG/M2 | HEIGHT: 65 IN

## 2021-09-18 DIAGNOSIS — R06.2 WHEEZING: ICD-10-CM

## 2021-09-18 DIAGNOSIS — I10 ESSENTIAL HYPERTENSION: Primary | ICD-10-CM

## 2021-09-18 DIAGNOSIS — R53.83 FATIGUE, UNSPECIFIED TYPE: ICD-10-CM

## 2021-09-18 PROCEDURE — 99214 OFFICE O/P EST MOD 30 MIN: CPT | Performed by: FAMILY MEDICINE

## 2021-09-18 RX ORDER — ALBUTEROL SULFATE 90 UG/1
2 AEROSOL, METERED RESPIRATORY (INHALATION) EVERY 6 HOURS PRN
Qty: 18 G | Refills: 0 | Status: SHIPPED | OUTPATIENT
Start: 2021-09-18

## 2021-09-18 RX ORDER — METOPROLOL SUCCINATE 25 MG/1
25 TABLET, EXTENDED RELEASE ORAL NIGHTLY
Qty: 30 TABLET | Refills: 11 | Status: SHIPPED | OUTPATIENT
Start: 2021-09-18 | End: 2021-12-29 | Stop reason: SDUPTHER

## 2021-09-18 NOTE — PATIENT INSTRUCTIONS
Managing Your Hypertension  Hypertension, also called high blood pressure, is when the force of the blood pressing against the walls of the arteries is too strong. Arteries are blood vessels that carry blood from your heart throughout your body. Hypertension forces the heart to work harder to pump blood and may cause the arteries to become narrow or stiff.  Understanding blood pressure readings  Your personal target blood pressure may vary depending on your medical conditions, your age, and other factors. A blood pressure reading includes a higher number over a lower number. Ideally, your blood pressure should be below 120/80. You should know that:  · The first, or top, number is called the systolic pressure. It is a measure of the pressure in your arteries as your heart beats.  · The second, or bottom number, is called the diastolic pressure. It is a measure of the pressure in your arteries as the heart relaxes.  Blood pressure is classified into four stages. Based on your blood pressure reading, your health care provider may use the following stages to determine what type of treatment you need, if any. Systolic pressure and diastolic pressure are measured in a unit called mmHg.  Normal  · Systolic pressure: below 120.  · Diastolic pressure: below 80.  Elevated  · Systolic pressure: 120-129.  · Diastolic pressure: below 80.  Hypertension stage 1  · Systolic pressure: 130-139.  · Diastolic pressure: 80-89.  Hypertension stage 2  · Systolic pressure: 140 or above.  · Diastolic pressure: 90 or above.  How can this condition affect me?  Managing your hypertension is an important responsibility. Over time, hypertension can damage the arteries and decrease blood flow to important parts of the body, including the brain, heart, and kidneys. Having untreated or uncontrolled hypertension can lead to:  · A heart attack.  · A stroke.  · A weakened blood vessel (aneurysm).  · Heart failure.  · Kidney damage.  · Eye  damage.  · Metabolic syndrome.  · Memory and concentration problems.  · Vascular dementia.  What actions can I take to manage this condition?  Hypertension can be managed by making lifestyle changes and possibly by taking medicines. Your health care provider will help you make a plan to bring your blood pressure within a normal range.  Nutrition    · Eat a diet that is high in fiber and potassium, and low in salt (sodium), added sugar, and fat. An example eating plan is called the Dietary Approaches to Stop Hypertension (DASH) diet. To eat this way:  ? Eat plenty of fresh fruits and vegetables. Try to fill one-half of your plate at each meal with fruits and vegetables.  ? Eat whole grains, such as whole-wheat pasta, brown rice, or whole-grain bread. Fill about one-fourth of your plate with whole grains.  ? Eat low-fat dairy products.  ? Avoid fatty cuts of meat, processed or cured meats, and poultry with skin. Fill about one-fourth of your plate with lean proteins such as fish, chicken without skin, beans, eggs, and tofu.  ? Avoid pre-made and processed foods. These tend to be higher in sodium, added sugar, and fat.  · Reduce your daily sodium intake. Most people with hypertension should eat less than 1,500 mg of sodium a day.    Lifestyle    · Work with your health care provider to maintain a healthy body weight or to lose weight. Ask what an ideal weight is for you.  · Get at least 30 minutes of exercise that causes your heart to beat faster (aerobic exercise) most days of the week. Activities may include walking, swimming, or biking.  · Include exercise to strengthen your muscles (resistance exercise), such as weight lifting, as part of your weekly exercise routine. Try to do these types of exercises for 30 minutes at least 3 days a week.  · Do not use any products that contain nicotine or tobacco, such as cigarettes, e-cigarettes, and chewing tobacco. If you need help quitting, ask your health care  provider.  · Control any long-term (chronic) conditions you have, such as high cholesterol or diabetes.  · Identify your sources of stress and find ways to manage stress. This may include meditation, deep breathing, or making time for fun activities.    Alcohol use  · Do not drink alcohol if:  ? Your health care provider tells you not to drink.  ? You are pregnant, may be pregnant, or are planning to become pregnant.  · If you drink alcohol:  ? Limit how much you use to:  § 0-1 drink a day for women.  § 0-2 drinks a day for men.  ? Be aware of how much alcohol is in your drink. In the [place] one drink equals one 12 oz bottle of beer (355 mL), one 5 oz glass of wine (148 mL), or one 1½ oz glass of hard liquor (44 mL).  Medicines  Your health care provider may prescribe medicine if lifestyle changes are not enough to get your blood pressure under control and if:  · Your systolic blood pressure is 130 or higher.  · Your diastolic blood pressure is 80 or higher.  Take medicines only as told by your health care provider. Follow the directions carefully. Blood pressure medicines must be taken as told by your health care provider. The medicine does not work as well when you skip doses. Skipping doses also puts you at risk for problems.  Monitoring  Before you monitor your blood pressure:  · Do not smoke, drink caffeinated beverages, or exercise within 30 minutes before taking a measurement.  · Use the bathroom and empty your bladder (urinate).  · Sit quietly for at least 5 minutes before taking measurements.  Monitor your blood pressure at home as told by your health care provider. To do this:  · Sit with your back straight and supported.  · Place your feet flat on the floor. Do not cross your legs.  · Support your arm on a flat surface, such as a table. Make sure your upper arm is at heart level.  · Each time you measure, take two or three readings one minute apart and record the results.  You may also need to have your  blood pressure checked regularly by your health care provider.  General information  · Talk with your health care provider about your diet, exercise habits, and other lifestyle factors that may be contributing to hypertension.  · Review all the medicines you take with your health care provider because there may be side effects or interactions.  · Keep all visits as told by your health care provider. Your health care provider can help you create and adjust your plan for managing your high blood pressure.  Where to find more information  · National Heart, Lung, and Blood [place]: www.nhlbi.nih.gov  · American Heart Association: www.heart.org  Contact a health care provider if:  · You think you are having a reaction to medicines you have taken.  · You have repeated (recurrent) headaches.  · You feel dizzy.  · You have swelling in your ankles.  · You have trouble with your vision.  Get help right away if:  · You develop a severe headache or confusion.  · You have unusual weakness or numbness, or you feel faint.  · You have severe pain in your chest or abdomen.  · You vomit repeatedly.  · You have trouble breathing.  These symptoms may represent a serious problem that is an emergency. Do not wait to see if the symptoms will go away. Get medical help right away. Call your local emergency services (911 in the [place] Do not drive yourself to the hospital.  Summary  · Hypertension is when the force of blood pumping through your arteries is too strong. If this condition is not controlled, it may put you at risk for serious complications.  · Your personal target blood pressure may vary depending on your medical conditions, your age, and other factors. For most people, a normal blood pressure is less than 120/80.  · Hypertension is managed by lifestyle changes, medicines, or both.  · Lifestyle changes to help manage hypertension include losing weight, eating a healthy, low-sodium diet, exercising more, stopping smoking, and  limiting alcohol.  This information is not intended to replace advice given to you by your health care provider. Make sure you discuss any questions you have with your health care provider.  Document Revised: 01/22/2021 Document Reviewed: 11/17/2020  [employer] Patient Education © 2021 [employer]

## 2021-09-18 NOTE — PROGRESS NOTES
"Subjective    Heidi Franklin is a 41 y.o. female here for:  Chief Complaint   Patient presents with   • Hypertension       History per MA reviewed.    Recently told she had thrush by dentist  No history diabetes mellitus    Blood pressure running high at other doctor visits  She was hoping blood pressure would come down once she got approved for disability but hasn't  Insurance didn't cover Bystolic and was too expensive     Stays tired  Has eliminated methocarbamol to see if that's the cause  Not sure if it could be lyrica         The following portions of the patient's history were reviewed and updated as appropriate: allergies, current medications, past family history, past medical history, past social history, past surgical history and problem list.    Review of Systems   Constitutional: Positive for fatigue. Negative for fever.   Respiratory: Positive for cough. Negative for shortness of breath.    Allergic/Immunologic: Positive for environmental allergies.         Objective   Visit Vitals  BP (!) 146/104   Pulse 97   Resp 18   Ht 165.1 cm (65\")   Wt 96.3 kg (212 lb 6 oz)   SpO2 97%   BMI 35.34 kg/m²       Physical Exam  Vitals and nursing note reviewed.   Constitutional:       General: She is not in acute distress.     Appearance: Normal appearance. She is well-developed and well-groomed. She is not ill-appearing, toxic-appearing or diaphoretic.      Interventions: Face mask in place.   HENT:      Head: Normocephalic and atraumatic.      Right Ear: Hearing normal.      Left Ear: Hearing normal.   Eyes:      General: Lids are normal. No scleral icterus.     Extraocular Movements: Extraocular movements intact.   Neck:      Trachea: Phonation normal.   Cardiovascular:      Rate and Rhythm: Normal rate and regular rhythm.   Pulmonary:      Effort: Pulmonary effort is normal.      Breath sounds: No stridor. Examination of the right-upper field reveals wheezing. Wheezing present. No decreased breath sounds, rhonchi " or rales.   Musculoskeletal:      Cervical back: Neck supple.   Skin:     Coloration: Skin is not jaundiced or pale.   Neurological:      General: No focal deficit present.      Mental Status: She is alert and oriented to person, place, and time.      Motor: Motor function is intact.   Psychiatric:         Attention and Perception: Attention and perception normal.         Mood and Affect: Mood and affect normal.         Speech: Speech normal.         Behavior: Behavior normal. Behavior is cooperative.         Thought Content: Thought content normal.         Cognition and Memory: Cognition and memory normal.         Judgment: Judgment normal.         For medical decision making review of the following was required:  Lab Results   Component Value Date    WBC 8.52 11/09/2020    HGB 14.3 11/09/2020    HCT 42.3 11/09/2020    MCV 94.2 11/09/2020     11/09/2020     Lab Results   Component Value Date    BUN 12 11/09/2020    CREATININE 0.67 11/09/2020    EGFRIFNONA 97 11/09/2020    EGFRIFAFRI 117 11/09/2020    BCR 17.9 11/09/2020    K 5.0 11/09/2020    CO2 28.8 11/09/2020    CALCIUM 9.2 11/09/2020    PROTENTOTREF 6.6 11/09/2020    ALBUMIN 4.20 11/09/2020    LABIL2 1.8 11/09/2020    AST 13 11/09/2020    ALT 14 11/09/2020         Assessment/Plan     Problem List Items Addressed This Visit        Cardiac and Vasculature    Essential hypertension - Primary    Relevant Medications    metoprolol succinate XL (Toprol XL) 25 MG 24 hr tablet      Other Visit Diagnoses     Wheezing        Relevant Medications    albuterol sulfate HFA (Ventolin HFA) 108 (90 Base) MCG/ACT inhaler    Fatigue, unspecified type        possibly related to medicines. check labs next visit.          · Pt does not feel sick. Sent albuterol in case she notices any shortness of breath, cough, chest tightness, etc.  · Adding metoprolol xl 25 mg nightly. Discussed possible fatigue associated with it. Monitor blood pressure, goal <130/80. If not meeting this  may need to go up on medicine (if heart rate allows).     Return in about 2 months (around 11/18/2021) for Annual physical.  With labs. Check A1C due to thrush at dentist.    Martine Evans MD

## 2021-10-06 ENCOUNTER — PRIOR AUTHORIZATION (OUTPATIENT)
Dept: INTERNAL MEDICINE | Facility: CLINIC | Age: 41
End: 2021-10-06

## 2021-10-27 ENCOUNTER — TELEPHONE (OUTPATIENT)
Dept: INTERNAL MEDICINE | Facility: CLINIC | Age: 41
End: 2021-10-27

## 2021-10-27 NOTE — TELEPHONE ENCOUNTER
Caller: [email or web address]    Relationship:       Medication requested (name and dosage): UBRELVY     Requested Prescriptions: NA    Additional details provided by patient: A RESUBMISSION HAS BEEN STARTED REFERENCE KEY: [id number]    Best call back number: [phone]    Does the patient have less than a 3 day supply:  [] Yes  [] No    Stevie Logan Rep   10/27/21 17:26 EDT

## 2021-12-28 NOTE — PATIENT INSTRUCTIONS

## 2021-12-29 ENCOUNTER — OFFICE VISIT (OUTPATIENT)
Dept: INTERNAL MEDICINE | Facility: CLINIC | Age: 41
End: 2021-12-29

## 2021-12-29 VITALS
BODY MASS INDEX: 35.89 KG/M2 | HEIGHT: 65 IN | DIASTOLIC BLOOD PRESSURE: 96 MMHG | TEMPERATURE: 97.1 F | HEART RATE: 86 BPM | WEIGHT: 215.4 LBS | RESPIRATION RATE: 16 BRPM | SYSTOLIC BLOOD PRESSURE: 142 MMHG | OXYGEN SATURATION: 98 %

## 2021-12-29 DIAGNOSIS — Z51.81 MEDICATION MONITORING ENCOUNTER: ICD-10-CM

## 2021-12-29 DIAGNOSIS — Z23 NEED FOR COVID-19 VACCINE: ICD-10-CM

## 2021-12-29 DIAGNOSIS — Z00.00 ANNUAL PHYSICAL EXAM: Primary | ICD-10-CM

## 2021-12-29 DIAGNOSIS — Z23 NEED FOR INFLUENZA VACCINATION: ICD-10-CM

## 2021-12-29 DIAGNOSIS — R79.9 ABNORMAL BLOOD CHEMISTRY: ICD-10-CM

## 2021-12-29 DIAGNOSIS — G43.909 MIGRAINE WITHOUT STATUS MIGRAINOSUS, NOT INTRACTABLE, UNSPECIFIED MIGRAINE TYPE: ICD-10-CM

## 2021-12-29 DIAGNOSIS — M54.12 CERVICAL RADICULOPATHY: ICD-10-CM

## 2021-12-29 DIAGNOSIS — M48.02 SPINAL STENOSIS IN CERVICAL REGION: ICD-10-CM

## 2021-12-29 DIAGNOSIS — I10 ESSENTIAL HYPERTENSION: ICD-10-CM

## 2021-12-29 LAB
25(OH)D3+25(OH)D2 SERPL-MCNC: 21.9 NG/ML (ref 30–100)
ALBUMIN SERPL-MCNC: 4.4 G/DL (ref 3.5–5.2)
ALBUMIN/GLOB SERPL: 1.8 G/DL
ALP SERPL-CCNC: 102 U/L (ref 39–117)
ALT SERPL-CCNC: 14 U/L (ref 1–33)
AST SERPL-CCNC: 15 U/L (ref 1–32)
BILIRUB SERPL-MCNC: 0.3 MG/DL (ref 0–1.2)
BUN SERPL-MCNC: 13 MG/DL (ref 6–20)
BUN/CREAT SERPL: 18.6 (ref 7–25)
CALCIUM SERPL-MCNC: 9.5 MG/DL (ref 8.6–10.5)
CHLORIDE SERPL-SCNC: 103 MMOL/L (ref 98–107)
CHOLEST SERPL-MCNC: 223 MG/DL (ref 0–200)
CO2 SERPL-SCNC: 27.5 MMOL/L (ref 22–29)
CREAT SERPL-MCNC: 0.7 MG/DL (ref 0.57–1)
ERYTHROCYTE [DISTWIDTH] IN BLOOD BY AUTOMATED COUNT: 12.6 % (ref 12.3–15.4)
FOLATE SERPL-MCNC: 3.55 NG/ML (ref 4.78–24.2)
GLOBULIN SER CALC-MCNC: 2.4 GM/DL
GLUCOSE SERPL-MCNC: 97 MG/DL (ref 65–99)
HBA1C MFR BLD: 5.4 % (ref 4.8–5.6)
HCT VFR BLD AUTO: 44.7 % (ref 34–46.6)
HDLC SERPL-MCNC: 41 MG/DL (ref 40–60)
HGB BLD-MCNC: 15.2 G/DL (ref 12–15.9)
LDLC SERPL CALC-MCNC: 156 MG/DL (ref 0–100)
MCH RBC QN AUTO: 32.4 PG (ref 26.6–33)
MCHC RBC AUTO-ENTMCNC: 34 G/DL (ref 31.5–35.7)
MCV RBC AUTO: 95.3 FL (ref 79–97)
PLATELET # BLD AUTO: 256 10*3/MM3 (ref 140–450)
POTASSIUM SERPL-SCNC: 4.2 MMOL/L (ref 3.5–5.2)
PROT SERPL-MCNC: 6.8 G/DL (ref 6–8.5)
RBC # BLD AUTO: 4.69 10*6/MM3 (ref 3.77–5.28)
SODIUM SERPL-SCNC: 139 MMOL/L (ref 136–145)
T4 FREE SERPL-MCNC: 0.81 NG/DL (ref 0.93–1.7)
TRIGL SERPL-MCNC: 141 MG/DL (ref 0–150)
TSH SERPL DL<=0.005 MIU/L-ACNC: 2.18 UIU/ML (ref 0.27–4.2)
VIT B12 SERPL-MCNC: 324 PG/ML (ref 211–946)
VLDLC SERPL CALC-MCNC: 26 MG/DL (ref 5–40)
WBC # BLD AUTO: 8.81 10*3/MM3 (ref 3.4–10.8)

## 2021-12-29 PROCEDURE — 91300 COVID-19 (PFIZER): CPT | Performed by: FAMILY MEDICINE

## 2021-12-29 PROCEDURE — 99396 PREV VISIT EST AGE 40-64: CPT | Performed by: FAMILY MEDICINE

## 2021-12-29 PROCEDURE — 90471 IMMUNIZATION ADMIN: CPT | Performed by: FAMILY MEDICINE

## 2021-12-29 PROCEDURE — 90686 IIV4 VACC NO PRSV 0.5 ML IM: CPT | Performed by: FAMILY MEDICINE

## 2021-12-29 PROCEDURE — 0003A COVID-19 (PFIZER): CPT | Performed by: FAMILY MEDICINE

## 2021-12-29 RX ORDER — METHOCARBAMOL 500 MG/1
500 TABLET, FILM COATED ORAL 4 TIMES DAILY
Qty: 360 TABLET | Refills: 3 | Status: SHIPPED | OUTPATIENT
Start: 2021-12-29 | End: 2022-07-28

## 2021-12-29 RX ORDER — PREGABALIN 75 MG/1
75 CAPSULE ORAL 2 TIMES DAILY
Qty: 180 CAPSULE | Refills: 1 | Status: SHIPPED | OUTPATIENT
Start: 2021-12-29 | End: 2022-07-28

## 2021-12-29 RX ORDER — METOPROLOL SUCCINATE 50 MG/1
50 TABLET, EXTENDED RELEASE ORAL NIGHTLY
Qty: 90 TABLET | Refills: 3 | Status: SHIPPED | OUTPATIENT
Start: 2021-12-29 | End: 2022-06-30 | Stop reason: SDUPTHER

## 2021-12-29 RX ORDER — GALCANEZUMAB 120 MG/ML
120 INJECTION, SOLUTION SUBCUTANEOUS
Qty: 3 PEN | Refills: 4 | Status: SHIPPED | OUTPATIENT
Start: 2021-12-29 | End: 2022-09-08 | Stop reason: CLARIF

## 2021-12-29 NOTE — PROGRESS NOTES
12/29/2021  Chief Complaint   Patient presents with   • Annual Exam     annual physical   • Hypertension     2 month follow up       Heidi Franklin is here for her annual preventive exam. History per MA reviewed.     Thrush in September. Need to check A1C on labs today.  Was getting Lyrica through [facility]. Needs refill.  Up to date mammogram, pap. Has Mirena IUD.  Doesn't check blood pressure at home.   Insurance no longer covering Aimovig, prefers Emgality or Ajovy.      Heidi Franklin has the following medical issues:  Patient Active Problem List    Diagnosis    • Essential hypertension [I10]    • Chronic pain of both knees [M25.561, M25.562, G89.29]    • Chronic neck pain [M54.2, G89.29]    • Cervical radiculopathy [M54.12]    • Spinal stenosis in cervical region [M48.02]    • Cervical disc herniation [M50.20]    • Anxiety [F41.9]    • Bloodgood disease [N60.19]    • Headache, migraine [G43.909]    • Affective disorder (HCC) [F39]    • Goiter, nontoxic, multinodular [E04.2]        Health Maintenance   Topic Date Due   • ANNUAL PHYSICAL  11/10/2021   • MAMMOGRAM  05/05/2023   • PAP SMEAR  07/02/2024   • TDAP/TD VACCINES (3 - Td or Tdap) 12/20/2028   • Pneumococcal Vaccine 0-64 (2 of 2 - PPSV23) 08/05/2043   • HEPATITIS C SCREENING  Completed   • COVID-19 Vaccine  Completed   • INFLUENZA VACCINE  Completed       Immunization History   Administered Date(s) Administered   • COVID-19 (PFIZER) 04/07/2021, 04/28/2021, 12/29/2021   • FluLaval/Fluarix/Fluzone >6 11/09/2020, 12/29/2021   • Hepatitis A 12/20/2018, 10/07/2019   • Hepatitis B 11/13/2000   • Influenza, Unspecified 10/24/2018   • Pneumococcal Polysaccharide (PPSV23) 11/09/2020   • Td 06/17/2003   • Tdap 12/20/2018   • flucelvax quad pfs =>4 YRS 10/07/2019       Review of Systems   Constitutional: Negative for fever.   Musculoskeletal: Positive for arthralgias.   Neurological: Positive for headache.   Psychiatric/Behavioral: Positive for stress.   All other  "systems reviewed and are negative.      The following portions of the patient's history were reviewed and updated as appropriate: allergies, current medications, past family history, past medical history, past social history, past surgical history and problem list.    Objective   Visit Vitals  /96 (BP Location: Right arm, Patient Position: Sitting, Cuff Size: Adult)   Pulse 86   Temp 97.1 °F (36.2 °C) (Temporal)   Resp 16   Ht 165.1 cm (65\")   Wt 97.7 kg (215 lb 6.4 oz)   SpO2 98%   BMI 35.84 kg/m²        Physical Exam  Vitals and nursing note reviewed.   Constitutional:       General: She is not in acute distress.     Appearance: Normal appearance. She is well-developed and well-groomed. She is obese. She is not ill-appearing, toxic-appearing or diaphoretic.      Interventions: Face mask in place.   HENT:      Head: Normocephalic and atraumatic. Hair is normal.      Right Ear: Hearing, tympanic membrane, ear canal and external ear normal.      Left Ear: Hearing, tympanic membrane, ear canal and external ear normal.   Eyes:      General: Lids are normal. Gaze aligned appropriately. No scleral icterus.        Right eye: No discharge.         Left eye: No discharge.      Extraocular Movements: Extraocular movements intact.      Conjunctiva/sclera: Conjunctivae normal.      Pupils: Pupils are equal, round, and reactive to light.   Neck:      Thyroid: No thyromegaly.      Trachea: Trachea and phonation normal. No tracheal deviation.   Cardiovascular:      Rate and Rhythm: Normal rate and regular rhythm.      Heart sounds: Normal heart sounds. No murmur heard.  No friction rub. No gallop.    Pulmonary:      Effort: Pulmonary effort is normal.      Breath sounds: Normal breath sounds and air entry.   Abdominal:      General: Bowel sounds are normal. There is no distension.      Palpations: Abdomen is soft. Abdomen is not rigid. There is no mass.      Tenderness: There is no abdominal tenderness. There is no guarding " or rebound.   Musculoskeletal:         General: No tenderness or deformity.      Cervical back: Neck supple.      Right lower leg: No edema.      Left lower leg: No edema.   Skin:     General: Skin is warm.      Capillary Refill: Capillary refill takes less than 2 seconds.      Coloration: Skin is not cyanotic, jaundiced or pale.      Findings: No erythema or rash.      Nails: There is no clubbing.   Neurological:      General: No focal deficit present.      Mental Status: She is alert and oriented to person, place, and time.      Cranial Nerves: No cranial nerve deficit.      Motor: No tremor, atrophy, abnormal muscle tone or seizure activity.      Gait: Gait is intact. Gait normal.   Psychiatric:         Attention and Perception: Attention and perception normal.         Mood and Affect: Mood and affect normal.         Speech: Speech normal.         Behavior: Behavior normal. Behavior is cooperative.         Thought Content: Thought content normal.         Cognition and Memory: Cognition and memory normal.         Judgment: Judgment normal.         Lab Results   Component Value Date    CHLPL 185 11/09/2020    TRIG 144 11/09/2020    HDL 39 (L) 11/09/2020     (H) 11/09/2020     Lab Results   Component Value Date    TSH 0.882 11/09/2020     Lab Results   Component Value Date    FREET4 0.75 (L) 11/09/2020     Lab Results   Component Value Date    HGBA1C 5.30 12/20/2018       Assessment     Problem List Items Addressed This Visit        Cardiac and Vasculature    Essential hypertension    Relevant Medications    metoprolol succinate XL (Toprol XL) 50 MG 24 hr tablet    Other Relevant Orders    TSH+Free T4       Neuro    Headache, migraine    Relevant Medications    metoprolol succinate XL (Toprol XL) 50 MG 24 hr tablet    galcanezumab-gnlm (Emgality) 120 MG/ML auto-injector pen    pregabalin (LYRICA) 75 MG capsule    methocarbamol (ROBAXIN) 500 MG tablet    Cervical radiculopathy    Relevant Medications     pregabalin (LYRICA) 75 MG capsule    methocarbamol (ROBAXIN) 500 MG tablet    Spinal stenosis in cervical region    Relevant Medications    pregabalin (LYRICA) 75 MG capsule    methocarbamol (ROBAXIN) 500 MG tablet      Other Visit Diagnoses     Annual physical exam    -  Primary    Need for influenza vaccination        Relevant Orders    FluLaval/Fluarix/Fluzone >6 Months (0945-8169) (Completed)    Need for COVID-19 vaccine        Relevant Orders    COVID-19 Vaccine (Pfizer) (Completed)    Abnormal blood chemistry        Relevant Orders    TSH+Free T4    Comprehensive Metabolic Panel    Lipid Panel    Vitamin B12    Folate    Hemoglobin A1c    CBC (No Diff)    Vitamin D 25 Hydroxy    Medication monitoring encounter        Relevant Orders    TSH+Free T4    Comprehensive Metabolic Panel    Lipid Panel    Vitamin B12    Folate    Hemoglobin A1c    CBC (No Diff)    Vitamin D 25 Hydroxy          · Health maintenance information provided with patient plan.   · Counseled on age appropriate health screenings. Info from ACOG on cervical cancer screening provided.  · Immunizations for age discussed, encouraged.   · Encourage healthy habits such as exercise, healthy diet.  Patient's Body mass index is 35.84 kg/m². indicating that she is obese (BMI >30). Obesity-related health conditions include the following: hypertension. Obesity is unchanged. BMI is is above average; BMI management plan is completed. We discussed portion control..  · Increased metoprolol to 50 mg nightly, refilled Lyrica and robaxin. Monitor blood pressure at home. Goal <130/80.  · Return in about 3 months (around 3/29/2022) for Blood Pressure follow up.     Martine Evans MD

## 2021-12-30 DIAGNOSIS — E55.9 VITAMIN D DEFICIENCY: Primary | ICD-10-CM

## 2021-12-30 RX ORDER — CHOLECALCIFEROL (VITAMIN D3) 1250 MCG
50000 CAPSULE ORAL
Qty: 12 CAPSULE | Refills: 1 | Status: SHIPPED | OUTPATIENT
Start: 2021-12-30 | End: 2022-09-08

## 2022-01-29 DIAGNOSIS — M54.12 CERVICAL RADICULOPATHY: ICD-10-CM

## 2022-01-29 DIAGNOSIS — G89.29 CHRONIC NECK PAIN: ICD-10-CM

## 2022-01-29 DIAGNOSIS — M54.2 CHRONIC NECK PAIN: ICD-10-CM

## 2022-01-31 RX ORDER — CELECOXIB 200 MG/1
CAPSULE ORAL
Qty: 180 CAPSULE | Refills: 3 | Status: SHIPPED | OUTPATIENT
Start: 2022-01-31 | End: 2022-08-18 | Stop reason: SDUPTHER

## 2022-01-31 NOTE — TELEPHONE ENCOUNTER
Rx Refill Note  Requested Prescriptions     Pending Prescriptions Disp Refills   • celecoxib (CeleBREX) 200 MG capsule [Pharmacy Med Name: CELECOXIB 200MG CAPSULES] 180 capsule 3     Sig: TAKE 1 CAPSULE BY MOUTH TWICE DAILY      Last office visit with prescribing clinician: 12/29/2021      Next office visit with prescribing clinician: 3/31/2022            Seema Bahena MA  01/31/22, 08:10 EST

## 2022-03-31 ENCOUNTER — OFFICE VISIT (OUTPATIENT)
Dept: INTERNAL MEDICINE | Facility: CLINIC | Age: 42
End: 2022-03-31

## 2022-03-31 VITALS
WEIGHT: 218 LBS | SYSTOLIC BLOOD PRESSURE: 130 MMHG | TEMPERATURE: 97.7 F | DIASTOLIC BLOOD PRESSURE: 90 MMHG | OXYGEN SATURATION: 97 % | HEART RATE: 80 BPM | BODY MASS INDEX: 36.32 KG/M2 | HEIGHT: 65 IN | RESPIRATION RATE: 16 BRPM

## 2022-03-31 DIAGNOSIS — E55.9 VITAMIN D DEFICIENCY: ICD-10-CM

## 2022-03-31 DIAGNOSIS — I10 ESSENTIAL HYPERTENSION: Primary | ICD-10-CM

## 2022-03-31 DIAGNOSIS — E03.8 SUBCLINICAL HYPOTHYROIDISM: ICD-10-CM

## 2022-03-31 DIAGNOSIS — E53.8 FOLIC ACID DEFICIENCY: ICD-10-CM

## 2022-03-31 PROBLEM — N60.19 FIBROCYSTIC BREAST CHANGES: Status: ACTIVE | Noted: 2022-03-31

## 2022-03-31 PROCEDURE — 99214 OFFICE O/P EST MOD 30 MIN: CPT | Performed by: FAMILY MEDICINE

## 2022-03-31 RX ORDER — CHLORTHALIDONE 25 MG/1
25 TABLET ORAL DAILY
Qty: 30 TABLET | Refills: 3 | Status: SHIPPED | OUTPATIENT
Start: 2022-03-31 | End: 2022-08-09

## 2022-03-31 NOTE — PROGRESS NOTES
Subjective    Heidi Franklin is a 41 y.o. female here for:  Chief Complaint   Patient presents with   • Hypertension     Follow up       Assessment/Plan   Problem List Items Addressed This Visit        Cardiac and Vasculature    Essential hypertension - Primary    Relevant Medications    chlorthalidone (HYGROTON) 25 MG tablet    Other Relevant Orders    Comprehensive Metabolic Panel    TSH+Free T4      Other Visit Diagnoses     Vitamin D deficiency        Relevant Orders    Comprehensive Metabolic Panel    Vitamin D 25 Hydroxy    Subclinical hypothyroidism        Relevant Orders    TSH+Free T4    Triiodothyronine (T3) Total & Free    Folic acid deficiency        Relevant Orders    Vitamin B12 & Folate        Starting chlorthalidone to help with edema/swelling and hypertension. Labs next week to check renal function, electrolytes as well as repeat abnormal labs from past. Family history of thyroid disease (mother). Discussed meds she's taking could cause blood pressure elevation (NSAIDs, snri) but overall I feel she'd do poorly w/o NSAID. Goal vitamin D level 50s.       Return in about 3 months (around 6/30/2022) for Blood Pressure follow up.      Answers for HPI/ROS submitted by the patient on 3/31/2022  What is the primary reason for your visit?: High Blood Pressure      History per MA reviewed.    Home blood pressures similar to here dbp staying around 90         The following portions of the patient's history were reviewed and updated as appropriate: allergies, current medications, past family history, past medical history, past social history, past surgical history and problem list.    Review of Systems   Cardiovascular: Positive for leg swelling.   Musculoskeletal: Positive for arthralgias.   Psychiatric/Behavioral: Positive for stress.         Objective   Visit Vitals  /90 (BP Location: Right arm, Patient Position: Sitting, Cuff Size: Adult)   Pulse 80   Temp 97.7 °F (36.5 °C) (Temporal)   Resp 16   Ht  "165.1 cm (65\")   Wt 98.9 kg (218 lb)   SpO2 97%   BMI 36.28 kg/m²       Physical Exam  Vitals and nursing note reviewed.   Constitutional:       General: She is not in acute distress.     Appearance: Normal appearance. She is well-developed and well-groomed. She is obese. She is not ill-appearing, toxic-appearing or diaphoretic.      Interventions: Face mask in place.   HENT:      Head: Normocephalic and atraumatic.      Right Ear: Hearing normal.      Left Ear: Hearing normal.   Eyes:      General: Lids are normal. No scleral icterus.     Extraocular Movements: Extraocular movements intact.   Neck:      Trachea: Phonation normal.   Pulmonary:      Effort: Pulmonary effort is normal.   Musculoskeletal:      Cervical back: Neck supple.   Skin:     Coloration: Skin is not jaundiced or pale.   Neurological:      General: No focal deficit present.      Mental Status: She is alert and oriented to person, place, and time.      Motor: Motor function is intact.   Psychiatric:         Attention and Perception: Attention and perception normal.         Mood and Affect: Mood and affect normal.         Speech: Speech normal.         Behavior: Behavior normal. Behavior is cooperative.         Thought Content: Thought content normal.         Cognition and Memory: Cognition and memory normal.         Judgment: Judgment normal.             For medical decision making review of the following was required:  CMP    CMP 12/29/21   Glucose 97   BUN 13   Creatinine 0.70   eGFR Non  Am 91   eGFR African Am 111   Sodium 139   Potassium 4.2   Chloride 103   Calcium 9.5   Total Protein 6.8   Albumin 4.40   Globulin 2.4   Total Bilirubin 0.3   Alkaline Phosphatase 102   AST (SGOT) 15   ALT (SGPT) 14      Comments are available for some flowsheets but are not being displayed.             Lab Results   Component Value Date    HGBA1C 5.40 12/29/2021    HGBA1C 5.30 12/20/2018     Lab Results   Component Value Date    TSH 2.180 12/29/2021 "     Lab Results   Component Value Date    FREET4 0.81 (L) 12/29/2021           Martine Evans MD

## 2022-04-20 ENCOUNTER — TRANSCRIBE ORDERS (OUTPATIENT)
Dept: ADMINISTRATIVE | Facility: HOSPITAL | Age: 42
End: 2022-04-20

## 2022-04-20 DIAGNOSIS — Z12.31 VISIT FOR SCREENING MAMMOGRAM: Primary | ICD-10-CM

## 2022-04-22 ENCOUNTER — TELEPHONE (OUTPATIENT)
Dept: ORTHOPEDIC SURGERY | Facility: CLINIC | Age: 42
End: 2022-04-22

## 2022-04-22 NOTE — TELEPHONE ENCOUNTER
Caller: DALLAS CRUZ     Relationship to patient: SELF    Best call back number:     Chief complaint: BILATERAL KNEE PAIN     Type of visit: FUP INJECTION

## 2022-05-02 ENCOUNTER — OFFICE VISIT (OUTPATIENT)
Dept: ORTHOPEDIC SURGERY | Facility: CLINIC | Age: 42
End: 2022-05-02

## 2022-05-02 VITALS — BODY MASS INDEX: 35.16 KG/M2 | TEMPERATURE: 98.2 F | WEIGHT: 211 LBS | HEIGHT: 65 IN

## 2022-05-02 DIAGNOSIS — M25.562 ARTHRALGIA OF LEFT KNEE: ICD-10-CM

## 2022-05-02 DIAGNOSIS — M17.0 PRIMARY OSTEOARTHRITIS OF BOTH KNEES: ICD-10-CM

## 2022-05-02 DIAGNOSIS — M22.2X1 PATELLOFEMORAL DISORDER OF BOTH KNEES: Primary | ICD-10-CM

## 2022-05-02 DIAGNOSIS — M22.2X2 PATELLOFEMORAL DISORDER OF BOTH KNEES: Primary | ICD-10-CM

## 2022-05-02 PROCEDURE — 99213 OFFICE O/P EST LOW 20 MIN: CPT | Performed by: PHYSICIAN ASSISTANT

## 2022-05-02 NOTE — PROGRESS NOTES
"Subjective   Patient ID: Heidi Franklin is a 41 y.o.female  Pain of the Right Knee and Pain of the Left Knee         History of Present Illness  Patient presents for reevaluation of bilateral knee pain.  Left is greater than right.  She states in 2022 she did have a fall outside a gas station and seems like this has increased her left knee pain.  She reports the previously administered cortisone injections did not provide any relief.  Patient also mentions she did attend formal physical therapy for her bilateral knee issues which did not provide relief. she notices a \"locking sensation when straightening and bending the left knee\"  She endorses chronic bulging disc in her lumbar spine with tingling to the bilateral lower extremities. ( this is managed by Dr. Romero per patient. She denies anterior groin pain                                                 Past Medical History:   Diagnosis Date   • Anxiety    • Arthritis    • Carpal tunnel syndrome     right   • Cervical disc herniation 2016   • Encounter for insertion of mirena IUD 2016   • Encounter for insertion of mirena IUD 2016   • Essential hypertension 2021   • Headache    • History of Papanicolaou smear of cervix 10/20/2015    WNL   • History of Papanicolaou smear of cervix 2016    WNL   • Low back pain    • Scoliosis         Past Surgical History:   Procedure Laterality Date   • BREAST BIOPSY     • BREAST LUMPECTOMY      right   • BREAST SURGERY Right    •  SECTION     • INTERVENTIONAL RADIOLOGY PROCEDURE N/A 2017    Procedure:  myelogram cervical spine;  Surgeon: Moody Fitch MD;  Location: [facility] INVASIVE LOCATION;  Service:    • OTHER SURGICAL HISTORY      Breast Mammatome, Needle Biopsy, ACDF    • SPINE SURGERY  2017, 2018    ACDF C5-6 and C6-7, ACDF C4-5   • SPINE SURGERY  2019    3rd attempt   • US GUIDED CYST ASPIRATION BREAST N/A 2020   • US GUIDED CYST ASPIRATION " BREAST N/A 2020   • US GUIDED CYST ASPIRATION BREAST N/A 2021       Family History   Problem Relation Age of Onset   • COPD Other    • Arthritis Maternal Aunt         Spine   • Breast cancer Maternal Aunt    • Arthritis Maternal Aunt         Spine   • Drug abuse Maternal Aunt             • Breast cancer Maternal Aunt    • Arthritis Maternal Grandmother         Spine   • Cancer Mother         Throat Cancer-   • COPD Mother                 Social History     Socioeconomic History   • Marital status:    Tobacco Use   • Smoking status: Current Every Day Smoker     Packs/day: 1.50     Years: 15.00     Pack years: 22.50     Types: Cigarettes   • Smokeless tobacco: Never Used   Substance and Sexual Activity   • Alcohol use: No   • Drug use: No   • Sexual activity: Yes     Partners: Male     Birth control/protection: I.U.D.     Comment: Mirena         Current Outpatient Medications:   •  acetaminophen (TYLENOL) 500 MG tablet, Take 500 mg by mouth Every 6 (Six) Hours As Needed for mild pain (1-3)., Disp: , Rfl:   •  albuterol sulfate HFA (Ventolin HFA) 108 (90 Base) MCG/ACT inhaler, Inhale 2 puffs Every 6 (Six) Hours As Needed for Wheezing or Shortness of Air., Disp: 18 g, Rfl: 0  •  celecoxib (CeleBREX) 200 MG capsule, TAKE 1 CAPSULE BY MOUTH TWICE DAILY, Disp: 180 capsule, Rfl: 3  •  chlorthalidone (HYGROTON) 25 MG tablet, Take 1 tablet by mouth Daily. Indications: Edema, High Blood Pressure Disorder, Disp: 30 tablet, Rfl: 3  •  Cholecalciferol (Vitamin D3) 1.25 MG (97352 UT) capsule, Take 1 capsule by mouth Every 7 (Seven) Days., Disp: 12 capsule, Rfl: 1  •  clotrimazole (LOTRIMIN) 1 % cream, Apply  topically to the appropriate area as directed 2 (Two) Times a Day., Disp: 60 g, Rfl: 3  •  diazePAM (VALIUM) 5 MG tablet, TAKE 1 TABLET BY MOUTH THREE TIMES DAILY AS NEEDED FOR MUSCLE SPASMS, Disp: 90 tablet, Rfl: 1  •  DULoxetine (CYMBALTA) 60 MG capsule, TAKE 1 CAPSULE BY  "MOUTH DAILY, Disp: 90 capsule, Rfl: 3  •  galcanezumab-gnlm (Emgality) 120 MG/ML auto-injector pen, Inject 1 mL under the skin into the appropriate area as directed Every 30 (Thirty) Days., Disp: 3 pen, Rfl: 4  •  HYDROcodone-acetaminophen (NORCO)  MG per tablet, Take 1 tablet by mouth Every 8 (Eight) Hours As Needed for Moderate Pain  or Severe Pain ., Disp: , Rfl:   •  ketoconazole (NIZORAL) 2 % shampoo, Apply to affected area of damp skin, lather, leave on 5 min and rinse., Disp: 120 mL, Rfl: 1  •  Levonorgestrel (MIRENA, 52 MG, IU), by Intrauterine route., Disp: , Rfl:   •  methocarbamol (ROBAXIN) 500 MG tablet, Take 1 tablet by mouth 4 (Four) Times a Day., Disp: 360 tablet, Rfl: 3  •  metoprolol succinate XL (Toprol XL) 50 MG 24 hr tablet, Take 1 tablet by mouth Every Night. Indications: High Blood Pressure Disorder, Disp: 90 tablet, Rfl: 3  •  pregabalin (LYRICA) 75 MG capsule, Take 1 capsule by mouth 2 (Two) Times a Day., Disp: 180 capsule, Rfl: 1    Allergies   Allergen Reactions   • Citalopram Other (See Comments) and Unknown - High Severity       Review of Systems   Musculoskeletal: Positive for arthralgias (bilateral knee).   All other systems reviewed and are negative.      I have reviewed the medical and surgical history, family history, social history, medications, and/or allergies, and the review of systems of this report.    Objective   Temp 98.2 °F (36.8 °C)   Ht 165.1 cm (65\")   Wt 95.7 kg (211 lb)   BMI 35.11 kg/m²    Physical Exam  Vitals and nursing note reviewed.   Constitutional:       Appearance: Normal appearance.   Pulmonary:      Effort: Pulmonary effort is normal.   Musculoskeletal:      Right knee: No erythema or ecchymosis. Tenderness present over the medial joint line and lateral joint line.      Instability Tests: Anterior drawer test negative. Posterior drawer test negative. Medial Juju test negative and lateral Juju test negative.      Left knee: Swelling present. " No deformity, effusion, erythema or ecchymosis. Tenderness present over the medial joint line and lateral joint line. No LCL laxity, MCL laxity, ACL laxity or PCL laxity.     Instability Tests: Anterior drawer test negative. Posterior drawer test negative. Medial Juju test negative and lateral Juju test negative.   Neurological:      Mental Status: She is alert and oriented to person, place, and time.       Right Knee Exam     Tests   Juju:  Medial - negative Lateral - negative      Left Knee Exam     Tests   Juju:  Medial - negative Lateral - negative    Other   Effusion: no effusion present           Extremity DVT signs are negative on physical exam with negative Abril sign and no calf pain   Neurologic Exam     Mental Status   Oriented to person, place, and time.      + bilateral knee crepitus           Assessment/Plan   Independent Review of Radiographic Studies:    No new imaging done today.  Reviewed imaging with patient at a prior visit.      Procedures       Diagnoses and all orders for this visit:    1. Patellofemoral disorder of both knees (Primary)  -     MRI Knee Left Without Contrast    2. Arthralgia of left knee  -     MRI Knee Left Without Contrast    3. Primary osteoarthritis of both knees  -     MRI Knee Left Without Contrast       Orthopedic activities reviewed and patient expressed appreciation  Discussion of orthopedic goals  Risk, benefits, and merits of treatment alternatives reviewed with the patient and questions answered    Recommendations/Plan:  Exercise, medications, injections, other patient advice, and return appointment as noted.  Patient is encouraged to call or return for any issues or concerns.    Follow up after MRI    Patient agreeable to call or return sooner for any concerns.      EMR Dragon-transcription disclaimer:  This encounter note is an electronic transcription of spoken language to printed text.  Electronic transcription of spoken language may permit  erroneous or at times nonsensical words or phrases to be inadvertently transcribed.  Although I have reviewed the note for such errors, some may still exist

## 2022-05-31 ENCOUNTER — TELEPHONE (OUTPATIENT)
Dept: ORTHOPEDIC SURGERY | Facility: CLINIC | Age: 42
End: 2022-05-31

## 2022-05-31 NOTE — TELEPHONE ENCOUNTER
Called patient to check status of her insurance change, she had stated at last appt she was to switch to medicare as of 6/1/22, awaiting new insurance info to schedule bilateral Supartz injections

## 2022-06-15 ENCOUNTER — APPOINTMENT (OUTPATIENT)
Dept: MRI IMAGING | Facility: HOSPITAL | Age: 42
End: 2022-06-15

## 2022-06-16 ENCOUNTER — TELEPHONE (OUTPATIENT)
Dept: ORTHOPEDIC SURGERY | Facility: CLINIC | Age: 42
End: 2022-06-16

## 2022-06-16 NOTE — TELEPHONE ENCOUNTER
Patient states she has [place] still as her primary, Medicare secondary but that will change to [employer] 7/1/22, she has R knee MRI scheduled 7/15 and will follow up 7/19/22, we will discuss ordering the gel injections at that time

## 2022-06-21 ENCOUNTER — HOSPITAL ENCOUNTER (OUTPATIENT)
Dept: MAMMOGRAPHY | Facility: HOSPITAL | Age: 42
Discharge: HOME | End: 2022-06-21
Admitting: SURGERY

## 2022-06-21 DIAGNOSIS — Z12.31 VISIT FOR SCREENING MAMMOGRAM: ICD-10-CM

## 2022-06-21 PROCEDURE — 77067 SCR MAMMO BI INCL CAD: CPT

## 2022-06-21 PROCEDURE — 77063 BREAST TOMOSYNTHESIS BI: CPT

## 2022-06-24 NOTE — PROGRESS NOTES
Patient: Heidi Franklin    YOB: 1980    Date: 06/27/2022    Primary Care Provider: Martine Evans MD    Chief Complaint   Patient presents with   • Post-op Follow-up     mamogram       SUBJECTIVE:    History of present illness:  Patient is s/p bilateral breast biopsies done in the remote past which were benign.  Patient is here for follow-up mammogram which was done 06/21/2022, it was read BI-RADS category 2 with benign findings.    She does complain of breast discomfort in both breast right worse than left.  She has had a previous incision made in the right axilla and ectopic breast tissue was removed.  She has a longstanding history of fibrocystic changes.    The following portions of the patient's history were reviewed and updated as appropriate: allergies, current medications, past family history, past medical history, past social history, past surgical history and problem list.      Review of Systems   Constitutional: Negative for chills, fever and unexpected weight change.   HENT: Negative for hearing loss, trouble swallowing and voice change.    Eyes: Negative for visual disturbance.   Respiratory: Negative for apnea, cough, chest tightness, shortness of breath and wheezing.    Cardiovascular: Negative for chest pain, palpitations and leg swelling.   Gastrointestinal: Negative for abdominal distention, abdominal pain, anal bleeding, blood in stool, constipation, diarrhea, nausea, rectal pain and vomiting.   Endocrine: Negative for cold intolerance and heat intolerance.   Genitourinary: Negative for difficulty urinating, dysuria and flank pain.   Musculoskeletal: Negative for back pain and gait problem.   Skin: Negative for color change, rash and wound.   Neurological: Negative for dizziness, syncope, speech difficulty, weakness, light-headedness, numbness and headaches.   Hematological: Negative for adenopathy. Does not bruise/bleed easily.   Psychiatric/Behavioral: Negative for  confusion. The patient is not nervous/anxious.        Allergies:  Allergies   Allergen Reactions   • Citalopram Other (See Comments) and Unknown - High Severity       Medications:    Current Outpatient Medications:   •  acetaminophen (TYLENOL) 500 MG tablet, Take 500 mg by mouth Every 6 (Six) Hours As Needed for mild pain (1-3)., Disp: , Rfl:   •  albuterol sulfate HFA (Ventolin HFA) 108 (90 Base) MCG/ACT inhaler, Inhale 2 puffs Every 6 (Six) Hours As Needed for Wheezing or Shortness of Air., Disp: 18 g, Rfl: 0  •  celecoxib (CeleBREX) 200 MG capsule, TAKE 1 CAPSULE BY MOUTH TWICE DAILY, Disp: 180 capsule, Rfl: 3  •  chlorthalidone (HYGROTON) 25 MG tablet, Take 1 tablet by mouth Daily. Indications: Edema, High Blood Pressure Disorder, Disp: 30 tablet, Rfl: 3  •  Cholecalciferol (Vitamin D3) 1.25 MG (57459 UT) capsule, Take 1 capsule by mouth Every 7 (Seven) Days., Disp: 12 capsule, Rfl: 1  •  clotrimazole (LOTRIMIN) 1 % cream, Apply  topically to the appropriate area as directed 2 (Two) Times a Day., Disp: 60 g, Rfl: 3  •  diazePAM (VALIUM) 5 MG tablet, TAKE 1 TABLET BY MOUTH THREE TIMES DAILY AS NEEDED FOR MUSCLE SPASMS, Disp: 90 tablet, Rfl: 1  •  DULoxetine (CYMBALTA) 60 MG capsule, TAKE 1 CAPSULE BY MOUTH DAILY, Disp: 90 capsule, Rfl: 3  •  galcanezumab-gnlm (Emgality) 120 MG/ML auto-injector pen, Inject 1 mL under the skin into the appropriate area as directed Every 30 (Thirty) Days., Disp: 3 pen, Rfl: 4  •  HYDROcodone-acetaminophen (NORCO)  MG per tablet, Take 1 tablet by mouth Every 8 (Eight) Hours As Needed for Moderate Pain  or Severe Pain ., Disp: , Rfl:   •  ketoconazole (NIZORAL) 2 % shampoo, Apply to affected area of damp skin, lather, leave on 5 min and rinse., Disp: 120 mL, Rfl: 1  •  Levonorgestrel (MIRENA, 52 MG, IU), by Intrauterine route., Disp: , Rfl:   •  methocarbamol (ROBAXIN) 500 MG tablet, Take 1 tablet by mouth 4 (Four) Times a Day., Disp: 360 tablet, Rfl: 3  •  metoprolol succinate  XL (Toprol XL) 50 MG 24 hr tablet, Take 1 tablet by mouth Every Night. Indications: High Blood Pressure Disorder, Disp: 90 tablet, Rfl: 3  •  pregabalin (LYRICA) 75 MG capsule, Take 1 capsule by mouth 2 (Two) Times a Day., Disp: 180 capsule, Rfl: 1    History:  Past Medical History:   Diagnosis Date   • Anxiety    • Arthritis    • Carpal tunnel syndrome     right   • Cervical disc herniation 2016   • Encounter for insertion of mirena IUD 2016   • Encounter for insertion of mirena IUD 2016   • Essential hypertension 2021   • Headache    • History of Papanicolaou smear of cervix 10/20/2015    WNL   • History of Papanicolaou smear of cervix 2016    WNL   • Low back pain    • Scoliosis        Past Surgical History:   Procedure Laterality Date   • BREAST BIOPSY     • BREAST CYST ASPIRATION     • BREAST SURGERY Right    •  SECTION     • INTERVENTIONAL RADIOLOGY PROCEDURE N/A 2017    Procedure:  myelogram cervical spine;  Surgeon: Moody Fitch MD;  Location: [facility] INVASIVE LOCATION;  Service:    • OTHER SURGICAL HISTORY      Breast Mammatome, Needle Biopsy, ACDF    • SPINE SURGERY  2017, 2018    ACDF C5-6 and C6-7, ACDF C4-5   • SPINE SURGERY  2019    3rd attempt   • US GUIDED CYST ASPIRATION BREAST N/A 2020   • US GUIDED CYST ASPIRATION BREAST N/A 2020   • US GUIDED CYST ASPIRATION BREAST N/A 2021       Family History   Problem Relation Age of Onset   • Cancer Mother         Throat Cancer-   • COPD Mother             • Arthritis Maternal Grandmother         Spine   • Arthritis Maternal Aunt         Spine   • Arthritis Maternal Aunt         Spine   • Drug abuse Maternal Aunt             • COPD Other        Social History     Tobacco Use   • Smoking status: Current Every Day Smoker     Packs/day: 1.50     Years: 15.00     Pack years: 22.50     Types: Cigarettes   • Smokeless tobacco: Never Used  "  Substance Use Topics   • Alcohol use: No   • Drug use: No        OBJECTIVE:    Vital Signs:   Vitals:    06/27/22 1031   BP: 140/100   Pulse: 78   Resp: 14   Temp: 97.2 °F (36.2 °C)   SpO2: 99%   Weight: 95.3 kg (210 lb)   Height: 165.1 cm (65\")       Physical Exam:   General Appearance:    Alert, cooperative, in no acute distress   Head:    Normocephalic, without obvious abnormality, atraumatic   Eyes:            Lids and lashes normal, conjunctivae and sclerae normal, no   icterus, no pallor, corneas clear, PERRLA   Ears:    Ears appear intact with no abnormalities noted   Throat:   No oral lesions, no thrush, oral mucosa moist   Neck:   No adenopathy, supple, trachea midline, no thyromegaly, no   carotid bruit, no JVD   Lungs:     Clear to auscultation,respirations regular, even and                  unlabored    Heart:    Regular rhythm and normal rate, normal S1 and S2, no            murmur, no gallop, no rub, no click   Chest Wall:    No abnormalities observed   Abdomen:     Normal bowel sounds, no masses, no organomegaly, soft        non-tender, non-distended, no guarding, no rebound                tenderness   Extremities:   Moves all extremities well, no edema, no cyanosis, no             redness   Pulses:   Pulses palpable and equal bilaterally   Skin:   No bleeding, bruising or rash, there is tender breast bilaterally but no suspicious masses   Lymph nodes:   No palpable adenopathy   Neurologic:   Cranial nerves 2 - 12 grossly intact, sensation intact, DTR       present and equal bilaterally     Results Review:   I reviewed the patient's new clinical results.  I reviewed the patient's new imaging results and agree with the interpretation.  I reviewed the patient's other test results and agree with the interpretation    Review of Systems was reviewed and confirmed as accurate as documented by the MA.    ASSESSMENT/PLAN:    1. Mass of breast, unspecified laterality    2. Other abnormal and inconclusive " findings on diagnostic imaging of breast         I did have a detailed and extensive discussion with the patient in the office today and her mammogram was reviewed as well as bilateral breast ultrasound.  There is no evidence of suspicious masses, I need to see her back in the office in 1 year as a follow-up to her next scheduled bilateral mammogram, she clearly knows to see me sooner if she has any further problems.    I discussed the patients findings and my recommendations with patient        Electronically signed by Rizwan Perera MD  06/27/22

## 2022-06-27 ENCOUNTER — OFFICE VISIT (OUTPATIENT)
Dept: SURGERY | Facility: CLINIC | Age: 42
End: 2022-06-27

## 2022-06-27 VITALS
WEIGHT: 210 LBS | HEIGHT: 65 IN | SYSTOLIC BLOOD PRESSURE: 140 MMHG | DIASTOLIC BLOOD PRESSURE: 100 MMHG | OXYGEN SATURATION: 99 % | RESPIRATION RATE: 14 BRPM | HEART RATE: 78 BPM | TEMPERATURE: 97.2 F | BODY MASS INDEX: 34.99 KG/M2

## 2022-06-27 DIAGNOSIS — R92.8 OTHER ABNORMAL AND INCONCLUSIVE FINDINGS ON DIAGNOSTIC IMAGING OF BREAST: ICD-10-CM

## 2022-06-27 DIAGNOSIS — N63.0 MASS OF BREAST, UNSPECIFIED LATERALITY: Primary | ICD-10-CM

## 2022-06-27 PROCEDURE — 99213 OFFICE O/P EST LOW 20 MIN: CPT | Performed by: SURGERY

## 2022-06-30 ENCOUNTER — OFFICE VISIT (OUTPATIENT)
Dept: INTERNAL MEDICINE | Facility: CLINIC | Age: 42
End: 2022-06-30

## 2022-06-30 VITALS
BODY MASS INDEX: 34.99 KG/M2 | RESPIRATION RATE: 18 BRPM | HEIGHT: 65 IN | OXYGEN SATURATION: 96 % | WEIGHT: 210 LBS | TEMPERATURE: 97.5 F | SYSTOLIC BLOOD PRESSURE: 142 MMHG | DIASTOLIC BLOOD PRESSURE: 92 MMHG | HEART RATE: 102 BPM

## 2022-06-30 DIAGNOSIS — E55.9 VITAMIN D DEFICIENCY: ICD-10-CM

## 2022-06-30 DIAGNOSIS — E78.2 MIXED HYPERLIPIDEMIA: ICD-10-CM

## 2022-06-30 DIAGNOSIS — I10 ESSENTIAL HYPERTENSION: Primary | ICD-10-CM

## 2022-06-30 DIAGNOSIS — B37.81 THRUSH OF MOUTH AND ESOPHAGUS: ICD-10-CM

## 2022-06-30 DIAGNOSIS — B37.0 THRUSH OF MOUTH AND ESOPHAGUS: ICD-10-CM

## 2022-06-30 DIAGNOSIS — E03.8 SUBCLINICAL HYPOTHYROIDISM: ICD-10-CM

## 2022-06-30 DIAGNOSIS — B99.9 RECURRENT INFECTIONS: ICD-10-CM

## 2022-06-30 DIAGNOSIS — E53.8 FOLIC ACID DEFICIENCY: ICD-10-CM

## 2022-06-30 DIAGNOSIS — R79.9 ABNORMAL FINDING OF BLOOD CHEMISTRY, UNSPECIFIED: ICD-10-CM

## 2022-06-30 PROCEDURE — 99214 OFFICE O/P EST MOD 30 MIN: CPT | Performed by: FAMILY MEDICINE

## 2022-06-30 RX ORDER — METOPROLOL SUCCINATE 50 MG/1
50 TABLET, EXTENDED RELEASE ORAL DAILY
Qty: 90 TABLET | Refills: 3 | Status: SHIPPED | OUTPATIENT
Start: 2022-06-30

## 2022-06-30 NOTE — PROGRESS NOTES
06/30/2022      Assessment & Plan    Problem List Items Addressed This Visit        Cardiac and Vasculature    Essential hypertension - Primary    Relevant Medications    metoprolol succinate XL (Toprol XL) 50 MG 24 hr tablet    Other Relevant Orders    Comprehensive Metabolic Panel      Other Visit Diagnoses     Subclinical hypothyroidism        Relevant Medications    metoprolol succinate XL (Toprol XL) 50 MG 24 hr tablet    Other Relevant Orders    TSH+Free T4    Triiodothyronine (T3) Total & Free    T4    CBC & Differential    Folic acid deficiency        Relevant Orders    Vitamin B12 & Folate    Vitamin D deficiency        Relevant Orders    Vitamin D 25 Hydroxy    Thrush of mouth and esophagus (HCC)        Relevant Orders    Hemoglobin A1c    IgE    IgG, IgA, IgM    Recurrent infections        Relevant Orders    Hemoglobin A1c    IgE    IgG, IgA, IgM    CBC & Differential    Mixed hyperlipidemia        Relevant Orders    Lipid Panel    Abnormal finding of blood chemistry, unspecified         Relevant Medications    metoprolol succinate XL (Toprol XL) 50 MG 24 hr tablet    Other Relevant Orders    Hemoglobin A1c    TSH+Free T4    Triiodothyronine (T3) Total & Free    T4    Vitamin B12 & Folate    Vitamin D 25 Hydroxy    IgE    IgG, IgA, IgM    CBC & Differential    Comprehensive Metabolic Panel    Lipid Panel        Okay to move metoprolol to am dosing to help with compliance  Goal blood pressure <130/80      Return in 4 weeks (on 7/28/2022) for welcome to medicare, htn follow up.      Subjective      Heidi Franklin is a 41 y.o. female here for:  Chief Complaint   Patient presents with   • Hypertension     Hasnt been able to remember to take medication but twice a week             History per MA reviewed.    Forgets to take metoprolol at night, takes maybe 3-4x a week  Blood pressure remains high  Under a lot of stress, helping with an ill family member's disability case, trying to care for father, has forms  "for her own disability that need filled out  Stays tired. Didn't realize she had labs after last visit to do.  Dentist treating again for thrush, instructed to swish and swallow         The following portions of the patient's history were reviewed and updated as appropriate: allergies, current medications, past family history, past medical history, past social history, past surgical history and problem list.    Review of Systems   Constitutional: Positive for fatigue. Negative for fever.   Musculoskeletal: Positive for arthralgias.   Psychiatric/Behavioral: Positive for stress.         Objective   Visit Vitals  /92 (BP Location: Right arm, Patient Position: Sitting, Cuff Size: Adult)   Pulse 102   Temp 97.5 °F (36.4 °C) (Temporal)   Resp 18   Ht 165.1 cm (65\")   Wt 95.3 kg (210 lb)   SpO2 96%   BMI 34.95 kg/m²       Physical Exam  Vitals and nursing note reviewed.   Constitutional:       General: She is not in acute distress.     Appearance: Normal appearance. She is well-developed and well-groomed. She is obese. She is not ill-appearing, toxic-appearing or diaphoretic.      Interventions: Face mask in place.   HENT:      Head: Normocephalic and atraumatic.      Right Ear: Hearing normal.      Left Ear: Hearing normal.   Eyes:      General: Lids are normal. No scleral icterus.     Extraocular Movements: Extraocular movements intact.   Neck:      Trachea: Phonation normal.   Cardiovascular:      Rate and Rhythm: Normal rate and regular rhythm.   Pulmonary:      Effort: Pulmonary effort is normal.      Breath sounds: Normal breath sounds.   Musculoskeletal:      Cervical back: Neck supple.   Skin:     Coloration: Skin is not jaundiced or pale.   Neurological:      General: No focal deficit present.      Mental Status: She is alert and oriented to person, place, and time.      Motor: Motor function is intact.   Psychiatric:         Attention and Perception: Attention and perception normal.         Mood and " Affect: Mood and affect normal.         Speech: Speech normal.         Behavior: Behavior normal. Behavior is cooperative.         Thought Content: Thought content normal.         Cognition and Memory: Cognition and memory normal.         Judgment: Judgment normal.             Martine Evans MD

## 2022-06-30 NOTE — PATIENT INSTRUCTIONS
Managing Your Hypertension  Hypertension, also called high blood pressure, is when the force of the blood pressing against the walls of the arteries is too strong. Arteries are blood vessels that carry blood from your heart throughout your body. Hypertension forces the heart to work harder to pump blood and may cause the arteries to become narrow or stiff.  Understanding blood pressure readings  Your personal target blood pressure may vary depending on your medical conditions, your age, and other factors. A blood pressure reading includes a higher number over a lower number. Ideally, your blood pressure should be below 120/80. You should know that:  The first, or top, number is called the systolic pressure. It is a measure of the pressure in your arteries as your heart beats.  The second, or bottom number, is called the diastolic pressure. It is a measure of the pressure in your arteries as the heart relaxes.  Blood pressure is classified into four stages. Based on your blood pressure reading, your health care provider may use the following stages to determine what type of treatment you need, if any. Systolic pressure and diastolic pressure are measured in a unit called mmHg.  Normal  Systolic pressure: below 120.  Diastolic pressure: below 80.  Elevated  Systolic pressure: 120-129.  Diastolic pressure: below 80.  Hypertension stage 1  Systolic pressure: 130-139.  Diastolic pressure: 80-89.  Hypertension stage 2  Systolic pressure: 140 or above.  Diastolic pressure: 90 or above.  How can this condition affect me?  Managing your hypertension is an important responsibility. Over time, hypertension can damage the arteries and decrease blood flow to important parts of the body, including the brain, heart, and kidneys. Having untreated or uncontrolled hypertension can lead to:  A heart attack.  A stroke.  A weakened blood vessel (aneurysm).  Heart failure.  Kidney damage.  Eye damage.  Metabolic syndrome.  Memory and  concentration problems.  Vascular dementia.  What actions can I take to manage this condition?  Hypertension can be managed by making lifestyle changes and possibly by taking medicines. Your health care provider will help you make a plan to bring your blood pressure within a normal range.  Nutrition    Eat a diet that is high in fiber and potassium, and low in salt (sodium), added sugar, and fat. An example eating plan is called the Dietary Approaches to Stop Hypertension (DASH) diet. To eat this way:  Eat plenty of fresh fruits and vegetables. Try to fill one-half of your plate at each meal with fruits and vegetables.  Eat whole grains, such as whole-wheat pasta, brown rice, or whole-grain bread. Fill about one-fourth of your plate with whole grains.  Eat low-fat dairy products.  Avoid fatty cuts of meat, processed or cured meats, and poultry with skin. Fill about one-fourth of your plate with lean proteins such as fish, chicken without skin, beans, eggs, and tofu.  Avoid pre-made and processed foods. These tend to be higher in sodium, added sugar, and fat.  Reduce your daily sodium intake. Most people with hypertension should eat less than 1,500 mg of sodium a day.    Lifestyle    Work with your health care provider to maintain a healthy body weight or to lose weight. Ask what an ideal weight is for you.  Get at least 30 minutes of exercise that causes your heart to beat faster (aerobic exercise) most days of the week. Activities may include walking, swimming, or biking.  Include exercise to strengthen your muscles (resistance exercise), such as weight lifting, as part of your weekly exercise routine. Try to do these types of exercises for 30 minutes at least 3 days a week.  Do not use any products that contain nicotine or tobacco, such as cigarettes, e-cigarettes, and chewing tobacco. If you need help quitting, ask your health care provider.  Control any long-term (chronic) conditions you have, such as high  cholesterol or diabetes.  Identify your sources of stress and find ways to manage stress. This may include meditation, deep breathing, or making time for fun activities.    Alcohol use  Do not drink alcohol if:  Your health care provider tells you not to drink.  You are pregnant, may be pregnant, or are planning to become pregnant.  If you drink alcohol:  Limit how much you use to:  0-1 drink a day for women.  0-2 drinks a day for men.  Be aware of how much alcohol is in your drink. In the [place] one drink equals one 12 oz bottle of beer (355 mL), one 5 oz glass of wine (148 mL), or one 1½ oz glass of hard liquor (44 mL).  Medicines  Your health care provider may prescribe medicine if lifestyle changes are not enough to get your blood pressure under control and if:  Your systolic blood pressure is 130 or higher.  Your diastolic blood pressure is 80 or higher.  Take medicines only as told by your health care provider. Follow the directions carefully. Blood pressure medicines must be taken as told by your health care provider. The medicine does not work as well when you skip doses. Skipping doses also puts you at risk for problems.  Monitoring  Before you monitor your blood pressure:  Do not smoke, drink caffeinated beverages, or exercise within 30 minutes before taking a measurement.  Use the bathroom and empty your bladder (urinate).  Sit quietly for at least 5 minutes before taking measurements.  Monitor your blood pressure at home as told by your health care provider. To do this:  Sit with your back straight and supported.  Place your feet flat on the floor. Do not cross your legs.  Support your arm on a flat surface, such as a table. Make sure your upper arm is at heart level.  Each time you measure, take two or three readings one minute apart and record the results.  You may also need to have your blood pressure checked regularly by your health care provider.  General information  Talk with your health care  provider about your diet, exercise habits, and other lifestyle factors that may be contributing to hypertension.  Review all the medicines you take with your health care provider because there may be side effects or interactions.  Keep all visits as told by your health care provider. Your health care provider can help you create and adjust your plan for managing your high blood pressure.  Where to find more information  National Heart, [employer] [place]: www.nhlbi.nih.gov  American Heart Association: www.heart.org  Contact a health care provider if:  You think you are having a reaction to medicines you have taken.  You have repeated (recurrent) headaches.  You feel dizzy.  You have swelling in your ankles.  You have trouble with your vision.  Get help right away if:  You develop a severe headache or confusion.  You have unusual weakness or numbness, or you feel faint.  You have severe pain in your chest or abdomen.  You vomit repeatedly.  You have trouble breathing.  These symptoms may represent a serious problem that is an emergency. Do not wait to see if the symptoms will go away. Get medical help right away. Call your local emergency services (911 in the [place] Do not drive yourself to the hospital.  Summary  Hypertension is when the force of blood pumping through your arteries is too strong. If this condition is not controlled, it may put you at risk for serious complications.  Your personal target blood pressure may vary depending on your medical conditions, your age, and other factors. For most people, a normal blood pressure is less than 120/80.  Hypertension is managed by lifestyle changes, medicines, or both.  Lifestyle changes to help manage hypertension include losing weight, eating a healthy, low-sodium diet, exercising more, stopping smoking, and limiting alcohol.  This information is not intended to replace advice given to you by your health care provider. Make sure you discuss any questions  you have with your health care provider.  Document Revised: 01/22/2021 Document Reviewed: 11/17/2020  Elsevier Patient Education © 2021 [employer]

## 2022-07-05 DIAGNOSIS — D80.8 IGE DEFICIENCY: ICD-10-CM

## 2022-07-05 DIAGNOSIS — E03.9 ACQUIRED HYPOTHYROIDISM: Primary | ICD-10-CM

## 2022-07-05 LAB
25(OH)D3+25(OH)D2 SERPL-MCNC: 66.5 NG/ML (ref 30–100)
ALBUMIN SERPL-MCNC: 4.2 G/DL (ref 3.5–5.2)
ALBUMIN/GLOB SERPL: 1.5 G/DL
ALP SERPL-CCNC: 99 U/L (ref 39–117)
ALT SERPL-CCNC: 18 U/L (ref 1–33)
AST SERPL-CCNC: 18 U/L (ref 1–32)
BASOPHILS # BLD AUTO: 0.05 10*3/MM3 (ref 0–0.2)
BASOPHILS NFR BLD AUTO: 0.5 % (ref 0–1.5)
BILIRUB SERPL-MCNC: 0.2 MG/DL (ref 0–1.2)
BUN SERPL-MCNC: 11 MG/DL (ref 6–20)
BUN/CREAT SERPL: 16.7 (ref 7–25)
CALCIUM SERPL-MCNC: 9.5 MG/DL (ref 8.6–10.5)
CHLORIDE SERPL-SCNC: 97 MMOL/L (ref 98–107)
CHOLEST SERPL-MCNC: 218 MG/DL (ref 0–200)
CO2 SERPL-SCNC: 29.6 MMOL/L (ref 22–29)
CREAT SERPL-MCNC: 0.66 MG/DL (ref 0.57–1)
EGFRCR SERPLBLD CKD-EPI 2021: 111.8 ML/MIN/1.73
EOSINOPHIL # BLD AUTO: 0.08 10*3/MM3 (ref 0–0.4)
EOSINOPHIL NFR BLD AUTO: 0.7 % (ref 0.3–6.2)
ERYTHROCYTE [DISTWIDTH] IN BLOOD BY AUTOMATED COUNT: 12.9 % (ref 12.3–15.4)
FOLATE SERPL-MCNC: 5.48 NG/ML (ref 4.78–24.2)
GLOBULIN SER CALC-MCNC: 2.8 GM/DL
GLUCOSE SERPL-MCNC: 102 MG/DL (ref 65–99)
HBA1C MFR BLD: 5.7 % (ref 4.8–5.6)
HCT VFR BLD AUTO: 42.6 % (ref 34–46.6)
HDLC SERPL-MCNC: 37 MG/DL (ref 40–60)
HGB BLD-MCNC: 15.2 G/DL (ref 12–15.9)
IGA SERPL-MCNC: 240 MG/DL (ref 87–352)
IGE SERPL-ACNC: 4 IU/ML (ref 6–495)
IGG SERPL-MCNC: 845 MG/DL (ref 586–1602)
IGM SERPL-MCNC: 142 MG/DL (ref 26–217)
IMM GRANULOCYTES # BLD AUTO: 0.07 10*3/MM3 (ref 0–0.05)
IMM GRANULOCYTES NFR BLD AUTO: 0.6 % (ref 0–0.5)
LDLC SERPL CALC-MCNC: 146 MG/DL (ref 0–100)
LYMPHOCYTES # BLD AUTO: 2.95 10*3/MM3 (ref 0.7–3.1)
LYMPHOCYTES NFR BLD AUTO: 27.4 % (ref 19.6–45.3)
MCH RBC QN AUTO: 33 PG (ref 26.6–33)
MCHC RBC AUTO-ENTMCNC: 35.7 G/DL (ref 31.5–35.7)
MCV RBC AUTO: 92.6 FL (ref 79–97)
MONOCYTES # BLD AUTO: 0.94 10*3/MM3 (ref 0.1–0.9)
MONOCYTES NFR BLD AUTO: 8.7 % (ref 5–12)
NEUTROPHILS # BLD AUTO: 6.69 10*3/MM3 (ref 1.7–7)
NEUTROPHILS NFR BLD AUTO: 62.1 % (ref 42.7–76)
NRBC BLD AUTO-RTO: 0 /100 WBC (ref 0–0.2)
PLATELET # BLD AUTO: 245 10*3/MM3 (ref 140–450)
POTASSIUM SERPL-SCNC: 3.9 MMOL/L (ref 3.5–5.2)
PROT SERPL-MCNC: 7 G/DL (ref 6–8.5)
RBC # BLD AUTO: 4.6 10*6/MM3 (ref 3.77–5.28)
SODIUM SERPL-SCNC: 139 MMOL/L (ref 136–145)
T3 SERPL-MCNC: 190 NG/DL (ref 71–180)
T3FREE SERPL-MCNC: 4.2 PG/ML (ref 2–4.4)
T4 FREE SERPL-MCNC: 0.77 NG/DL (ref 0.93–1.7)
T4 SERPL-MCNC: 6.4 UG/DL (ref 4.5–12)
TRIGL SERPL-MCNC: 193 MG/DL (ref 0–150)
TSH SERPL DL<=0.005 MIU/L-ACNC: 2.76 UIU/ML (ref 0.27–4.2)
VIT B12 SERPL-MCNC: 378 PG/ML (ref 211–946)
VLDLC SERPL CALC-MCNC: 35 MG/DL (ref 5–40)
WBC # BLD AUTO: 10.78 10*3/MM3 (ref 3.4–10.8)

## 2022-07-05 RX ORDER — LEVOTHYROXINE SODIUM 0.05 MG/1
50 TABLET ORAL DAILY
Qty: 90 TABLET | Refills: 3 | Status: SHIPPED | OUTPATIENT
Start: 2022-07-05 | End: 2023-01-30 | Stop reason: SDUPTHER

## 2022-07-06 ENCOUNTER — OFFICE VISIT (OUTPATIENT)
Dept: OBSTETRICS AND GYNECOLOGY | Facility: CLINIC | Age: 42
End: 2022-07-06

## 2022-07-06 VITALS — DIASTOLIC BLOOD PRESSURE: 72 MMHG | WEIGHT: 211.2 LBS | BODY MASS INDEX: 35.15 KG/M2 | SYSTOLIC BLOOD PRESSURE: 110 MMHG

## 2022-07-06 DIAGNOSIS — Z01.419 ENCOUNTER FOR GYNECOLOGICAL EXAMINATION (GENERAL) (ROUTINE) WITHOUT ABNORMAL FINDINGS: Primary | ICD-10-CM

## 2022-07-06 DIAGNOSIS — Z12.31 ENCOUNTER FOR SCREENING MAMMOGRAM FOR BREAST CANCER: ICD-10-CM

## 2022-07-06 PROCEDURE — 99396 PREV VISIT EST AGE 40-64: CPT | Performed by: OBSTETRICS & GYNECOLOGY

## 2022-07-06 NOTE — PROGRESS NOTES
Subjective   Chief Complaint   Patient presents with   • Gynecologic Exam     Yearly exam and Pap smear     Heidi Franklin is a 41 y.o. year old .  No LMP recorded. Patient has had an implant.  She presents to be seen because of annual exam.   Mirena 1.5 years ago  OTHER COMPLAINTS:  Nothing else    The following portions of the patient's history were reviewed and updated as appropriate:  She  has a past medical history of Anxiety, Arthritis, Carpal tunnel syndrome, Cervical disc herniation (2016), Encounter for insertion of mirena IUD (2016), Encounter for insertion of mirena IUD (2016), Encounter for insertion of mirena IUD (2021), Essential hypertension (2021), Headache, History of Papanicolaou smear of cervix (10/20/2015), History of Papanicolaou smear of cervix (2016), Low back pain, and Scoliosis.  She does not have any pertinent problems on file.  She  has a past surgical history that includes Breast surgery (Right);  section; Interventional radiology procedure (N/A, 2017); Other surgical history; Spine surgery (2017, 2018); Breast biopsy; Spine surgery (2019); US Guided Cyst Aspiration Breast (N/A, 2020); US Guided Cyst Aspiration Breast (N/A, 2020); US Guided Cyst Aspiration Breast (N/A, 2021); and Breast cyst aspiration.  Her family history includes Arthritis in her maternal aunt, maternal aunt, and maternal grandmother; COPD in her mother and another family member; Cancer in her mother; Drug abuse in her maternal aunt.  She  reports that she has been smoking cigarettes. She has a 22.50 pack-year smoking history. She has never used smokeless tobacco. She reports that she does not drink alcohol and does not use drugs.  Current Outpatient Medications   Medication Sig Dispense Refill   • acetaminophen (TYLENOL) 500 MG tablet Take 500 mg by mouth Every 6 (Six) Hours As Needed for mild pain (1-3).     •  albuterol sulfate HFA (Ventolin HFA) 108 (90 Base) MCG/ACT inhaler Inhale 2 puffs Every 6 (Six) Hours As Needed for Wheezing or Shortness of Air. 18 g 0   • celecoxib (CeleBREX) 200 MG capsule TAKE 1 CAPSULE BY MOUTH TWICE DAILY 180 capsule 3   • chlorthalidone (HYGROTON) 25 MG tablet Take 1 tablet by mouth Daily. Indications: Edema, High Blood Pressure Disorder 30 tablet 3   • Cholecalciferol (Vitamin D3) 1.25 MG (63364 UT) capsule Take 1 capsule by mouth Every 7 (Seven) Days. 12 capsule 1   • clotrimazole (LOTRIMIN) 1 % cream Apply  topically to the appropriate area as directed 2 (Two) Times a Day. 60 g 3   • diazePAM (VALIUM) 5 MG tablet TAKE 1 TABLET BY MOUTH THREE TIMES DAILY AS NEEDED FOR MUSCLE SPASMS 90 tablet 1   • DULoxetine (CYMBALTA) 60 MG capsule TAKE 1 CAPSULE BY MOUTH DAILY 90 capsule 3   • galcanezumab-gnlm (Emgality) 120 MG/ML auto-injector pen Inject 1 mL under the skin into the appropriate area as directed Every 30 (Thirty) Days. 3 pen 4   • HYDROcodone-acetaminophen (NORCO)  MG per tablet Take 1 tablet by mouth Every 8 (Eight) Hours As Needed for Moderate Pain  or Severe Pain .     • ketoconazole (NIZORAL) 2 % shampoo Apply to affected area of damp skin, lather, leave on 5 min and rinse. 120 mL 1   • Levonorgestrel (MIRENA, 52 MG, IU) by Intrauterine route.     • levothyroxine (Synthroid) 50 MCG tablet Take 1 tablet by mouth Daily. Take on empty stomach at least 30 min before eating and other meds (for best absorption), for underactive thyroid 90 tablet 3   • methocarbamol (ROBAXIN) 500 MG tablet Take 1 tablet by mouth 4 (Four) Times a Day. 360 tablet 3   • metoprolol succinate XL (Toprol XL) 50 MG 24 hr tablet Take 1 tablet by mouth Daily. Indications: High Blood Pressure Disorder 90 tablet 3   • pregabalin (LYRICA) 75 MG capsule Take 1 capsule by mouth 2 (Two) Times a Day. 180 capsule 1     No current facility-administered medications for this visit.     Current Outpatient Medications on  File Prior to Visit   Medication Sig   • acetaminophen (TYLENOL) 500 MG tablet Take 500 mg by mouth Every 6 (Six) Hours As Needed for mild pain (1-3).   • albuterol sulfate HFA (Ventolin HFA) 108 (90 Base) MCG/ACT inhaler Inhale 2 puffs Every 6 (Six) Hours As Needed for Wheezing or Shortness of Air.   • celecoxib (CeleBREX) 200 MG capsule TAKE 1 CAPSULE BY MOUTH TWICE DAILY   • chlorthalidone (HYGROTON) 25 MG tablet Take 1 tablet by mouth Daily. Indications: Edema, High Blood Pressure Disorder   • Cholecalciferol (Vitamin D3) 1.25 MG (75532 UT) capsule Take 1 capsule by mouth Every 7 (Seven) Days.   • clotrimazole (LOTRIMIN) 1 % cream Apply  topically to the appropriate area as directed 2 (Two) Times a Day.   • diazePAM (VALIUM) 5 MG tablet TAKE 1 TABLET BY MOUTH THREE TIMES DAILY AS NEEDED FOR MUSCLE SPASMS   • DULoxetine (CYMBALTA) 60 MG capsule TAKE 1 CAPSULE BY MOUTH DAILY   • galcanezumab-gnlm (Emgality) 120 MG/ML auto-injector pen Inject 1 mL under the skin into the appropriate area as directed Every 30 (Thirty) Days.   • HYDROcodone-acetaminophen (NORCO)  MG per tablet Take 1 tablet by mouth Every 8 (Eight) Hours As Needed for Moderate Pain  or Severe Pain .   • ketoconazole (NIZORAL) 2 % shampoo Apply to affected area of damp skin, lather, leave on 5 min and rinse.   • Levonorgestrel (MIRENA, 52 MG, IU) by Intrauterine route.   • levothyroxine (Synthroid) 50 MCG tablet Take 1 tablet by mouth Daily. Take on empty stomach at least 30 min before eating and other meds (for best absorption), for underactive thyroid   • methocarbamol (ROBAXIN) 500 MG tablet Take 1 tablet by mouth 4 (Four) Times a Day.   • metoprolol succinate XL (Toprol XL) 50 MG 24 hr tablet Take 1 tablet by mouth Daily. Indications: High Blood Pressure Disorder   • pregabalin (LYRICA) 75 MG capsule Take 1 capsule by mouth 2 (Two) Times a Day.     No current facility-administered medications on file prior to visit.     She is allergic to  citalopram.    Social History    Tobacco Use      Smoking status: Current Every Day Smoker        Packs/day: 1.50        Years: 15.00        Pack years: 22.5        Types: Cigarettes      Smokeless tobacco: Never Used    Review of Systems  Consitutional POS: nothing reported    NEG: anorexia or night sweats   Gastointestinal POS: nothing reported    NEG: bloating, change in bowel habits, melena or reflux symptoms   Genitourinary POS: nothing reported    NEG: dysuria or hematuria   Integument POS: nothing reported    NEG: moles that are changing in size, shape, color or rashes   Breast POS: nothing reported    NEG: persistent breast lump, skin dimpling or nipple discharge         Respiratory: negative  Cardiovascular: negative          Objective   /72   Wt 95.8 kg (211 lb 3.2 oz)   Breastfeeding No   BMI 35.15 kg/m²     General:  well developed; well nourished  no acute distress  obese - Body mass index is 35.15 kg/m².   Skin:  No suspicious lesions seen   Thyroid: normal to inspection and palpation   Lungs:  breathing is unlabored  clear to auscultation bilaterally   Heart:  regular rate and rhythm, S1, S2 normal, no murmur, click, rub or gallop   Breasts:  Examined in supine position  Symmetric without masses or skin dimpling  Nipples normal without inversion, lesions or discharge  There are no palpable axillary nodes   Abdomen: soft, non-tender; no masses  no umbilical or inguinal hernias are present  no hepato-splenomegaly   Pelvis: Clinical staff was present for exam  External genitalia:  normal appearance of the external genitalia including Bartholin's and [place]'s glands.  :  urethral meatus normal;  Vaginal:  normal pink mucosa without prolapse or lesions.  Cervix:  normal appearance.  Uterus:  normal size, shape and consistency.  Adnexa:  normal bimanual exam of the adnexa.  Rectal:  digital rectal exam not performed; anus visually normal appearing.     Psychiatric: Alert and oriented ×3, mood and  affect appropriate  HEENT: Atraumatic, normocephalic, normal scleral icterus  Extremities: 2+ pulses bilaterally, no edema      Lab Review   CBC and CMP    Imaging   No data reviewed        Assessment   1. Normal PE     Plan   1. PAP done  2. Strings visible  3. Diet/xercise  4. MMG WNL last month    No orders of the defined types were placed in this encounter.         This note was electronically signed.      July 6, 2022

## 2022-07-11 LAB — REF LAB TEST METHOD: NORMAL

## 2022-07-14 DIAGNOSIS — G43.909 MIGRAINE WITHOUT STATUS MIGRAINOSUS, NOT INTRACTABLE, UNSPECIFIED MIGRAINE TYPE: ICD-10-CM

## 2022-07-14 RX ORDER — RIMEGEPANT SULFATE 75 MG/75MG
TABLET, ORALLY DISINTEGRATING ORAL
Qty: 8 TABLET | Refills: 11 | OUTPATIENT
Start: 2022-07-14

## 2022-07-14 NOTE — TELEPHONE ENCOUNTER
Rx Refill Note  Requested Prescriptions     Pending Prescriptions Disp Refills   • Nurtec 75 MG dispersible tablet [Pharmacy Med Name: NURTEC 75MG ODT TABLETS] 8 tablet 11     Sig: DISSOLVE 1 TABLET ON THE TONGUE DAILY AS NEEDED FOR MIGRAINE      Last office visit with prescribing clinician: 6/30/2022      Next office visit with prescribing clinician: 7/28/2022            Mayda Jean Baptiste LPN  07/14/22, 17:44 EDT

## 2022-07-15 ENCOUNTER — APPOINTMENT (OUTPATIENT)
Dept: MRI IMAGING | Facility: HOSPITAL | Age: 42
End: 2022-07-15

## 2022-07-22 DIAGNOSIS — G89.29 CHRONIC NECK PAIN: ICD-10-CM

## 2022-07-22 DIAGNOSIS — M54.2 CHRONIC NECK PAIN: ICD-10-CM

## 2022-07-22 DIAGNOSIS — F33.1 MODERATE EPISODE OF RECURRENT MAJOR DEPRESSIVE DISORDER: ICD-10-CM

## 2022-07-22 RX ORDER — DULOXETIN HYDROCHLORIDE 60 MG/1
60 CAPSULE, DELAYED RELEASE ORAL DAILY
Qty: 90 CAPSULE | Refills: 3 | Status: SHIPPED | OUTPATIENT
Start: 2022-07-22 | End: 2022-12-13 | Stop reason: SDUPTHER

## 2022-07-22 NOTE — TELEPHONE ENCOUNTER
Rx Refill Note  Requested Prescriptions     Pending Prescriptions Disp Refills   • DULoxetine (CYMBALTA) 60 MG capsule [Pharmacy Med Name: DULOXETINE DR 60MG CAPSULES] 90 capsule 3     Sig: TAKE 1 CAPSULE BY MOUTH DAILY      Last office visit with prescribing clinician: 6/30/2022      Next office visit with prescribing clinician: 7/28/2022            MAVERICK WARNER MA  07/22/22, 08:44 EDT

## 2022-07-25 RX ORDER — DIAZEPAM 5 MG/1
5 TABLET ORAL 3 TIMES DAILY PRN
Qty: 90 TABLET | Refills: 1 | Status: SHIPPED | OUTPATIENT
Start: 2022-07-25 | End: 2023-03-22

## 2022-07-25 NOTE — TELEPHONE ENCOUNTER
Rx Refill Note  Requested Prescriptions     Pending Prescriptions Disp Refills   • diazePAM (VALIUM) 5 MG tablet 90 tablet 1     Sig: Take 1 tablet by mouth 3 (Three) Times a Day As Needed. for muscle spams      Last office visit with prescribing clinician: 6/30/2022      Next office visit with prescribing clinician: 7/28/2022            Ambreen Ojeda LPN  07/25/22, 11:20 EDT

## 2022-07-28 ENCOUNTER — OFFICE VISIT (OUTPATIENT)
Dept: INTERNAL MEDICINE | Facility: CLINIC | Age: 42
End: 2022-07-28

## 2022-07-28 VITALS
SYSTOLIC BLOOD PRESSURE: 130 MMHG | OXYGEN SATURATION: 98 % | BODY MASS INDEX: 34.75 KG/M2 | DIASTOLIC BLOOD PRESSURE: 84 MMHG | RESPIRATION RATE: 16 BRPM | HEART RATE: 73 BPM | TEMPERATURE: 97.5 F | WEIGHT: 208.6 LBS | HEIGHT: 65 IN

## 2022-07-28 DIAGNOSIS — E66.9 CLASS 1 OBESITY WITH SERIOUS COMORBIDITY AND BODY MASS INDEX (BMI) OF 34.0 TO 34.9 IN ADULT, UNSPECIFIED OBESITY TYPE: ICD-10-CM

## 2022-07-28 DIAGNOSIS — E78.2 MIXED HYPERLIPIDEMIA: ICD-10-CM

## 2022-07-28 DIAGNOSIS — G43.909 MIGRAINE WITHOUT STATUS MIGRAINOSUS, NOT INTRACTABLE, UNSPECIFIED MIGRAINE TYPE: ICD-10-CM

## 2022-07-28 DIAGNOSIS — I10 ESSENTIAL HYPERTENSION: ICD-10-CM

## 2022-07-28 DIAGNOSIS — E03.9 ACQUIRED HYPOTHYROIDISM: ICD-10-CM

## 2022-07-28 DIAGNOSIS — Z72.0 TOBACCO USE: ICD-10-CM

## 2022-07-28 DIAGNOSIS — Z23 NEED FOR PNEUMOCOCCAL VACCINATION: ICD-10-CM

## 2022-07-28 DIAGNOSIS — Z00.00 WELCOME TO MEDICARE PREVENTIVE VISIT: Primary | ICD-10-CM

## 2022-07-28 DIAGNOSIS — M54.12 CERVICAL RADICULOPATHY: ICD-10-CM

## 2022-07-28 DIAGNOSIS — F33.1 MODERATE EPISODE OF RECURRENT MAJOR DEPRESSIVE DISORDER: ICD-10-CM

## 2022-07-28 DIAGNOSIS — M48.02 SPINAL STENOSIS IN CERVICAL REGION: ICD-10-CM

## 2022-07-28 DIAGNOSIS — D80.8 IGE DEFICIENCY: ICD-10-CM

## 2022-07-28 PROCEDURE — 90677 PCV20 VACCINE IM: CPT | Performed by: FAMILY MEDICINE

## 2022-07-28 PROCEDURE — 1159F MED LIST DOCD IN RCRD: CPT | Performed by: FAMILY MEDICINE

## 2022-07-28 PROCEDURE — 1170F FXNL STATUS ASSESSED: CPT | Performed by: FAMILY MEDICINE

## 2022-07-28 PROCEDURE — G0009 ADMIN PNEUMOCOCCAL VACCINE: HCPCS | Performed by: FAMILY MEDICINE

## 2022-07-28 PROCEDURE — G0402 INITIAL PREVENTIVE EXAM: HCPCS | Performed by: FAMILY MEDICINE

## 2022-07-28 PROCEDURE — 96160 PT-FOCUSED HLTH RISK ASSMT: CPT | Performed by: FAMILY MEDICINE

## 2022-07-28 PROCEDURE — 93000 ELECTROCARDIOGRAM COMPLETE: CPT | Performed by: FAMILY MEDICINE

## 2022-07-28 RX ORDER — METHOCARBAMOL 500 MG/1
500 TABLET, FILM COATED ORAL 4 TIMES DAILY PRN
Qty: 360 TABLET | Refills: 3 | Status: SHIPPED | OUTPATIENT
Start: 2022-07-28

## 2022-07-28 RX ORDER — PREGABALIN 75 MG/1
75 CAPSULE ORAL 2 TIMES DAILY PRN
Qty: 180 CAPSULE | Refills: 1 | Status: SHIPPED
Start: 2022-07-28 | End: 2022-11-28 | Stop reason: SDUPTHER

## 2022-07-28 NOTE — PATIENT INSTRUCTIONS
IF YOU SMOKE OR USE TOBACCO PLEASE READ THE FOLLOWING:  Why is smoking bad for me?  Smoking increases the risk of heart disease, lung disease, vascular disease, stroke, and cancer. If you smoke, STOP!    For more information:  Quit Now [place]  -QUIT-NOW  [email or web address]      Medicare Wellness  Personal Prevention Plan of Service     Date of Office Visit:    Encounter Provider:  Martine Evans MD  Place of Service:  [facility]  Patient Name: Heidi Franklin  :  1980    As part of the Medicare Wellness portion of your visit today, we are providing you with this personalized preventive plan of services (PPPS). This plan is based upon recommendations of the [place] Preventive Services Task Force (USPSTF) and the Advisory Committee on Immunization Practices (ACIP).    This lists the preventive care services that should be considered, and provides dates of when you are due. Items listed as completed are up-to-date and do not require any further intervention.    Health Maintenance   Topic Date Due    ANNUAL WELLNESS VISIT  Never done    Pneumococcal Vaccine 0-64 (2 - PCV) 2021    INFLUENZA VACCINE  10/01/2022    LIPID PANEL  2023    MAMMOGRAM  2024    PAP SMEAR  2025    TDAP/TD VACCINES (3 - Td or Tdap) 2028    HEPATITIS C SCREENING  Completed    COVID-19 Vaccine  Completed       Orders Placed This Encounter   Procedures    Pneumococcal Conjugate Vaccine 20-Valent All    ECG 12 Lead     This order was created via procedure documentation     Order Specific Question:   Release to patient     Answer:   Immediate       Return in about 6 weeks (around 2022) for Blood Pressure follow up, repeat labs for thyroid, cholesterol.

## 2022-07-28 NOTE — PROGRESS NOTES
The ABCs of the Annual Wellness Visit  Welcome to Medicare Visit    Chief Complaint   Patient presents with   • Welcome To Medicare     AWV and preventive care      Subjective {   History of Present Illness:  Heidi Franklin is a 41 y.o. female who presents for a  Welcome to Medicare Visit.    The following portions of the patient's history were reviewed and   updated as appropriate: allergies, current medications, past family history, past medical history, past social history, past surgical history and problem list.     Compared to one year ago, the patient feels her physical   health is worse.     Compared to one year ago, the patient feels her mental   health is worse.    Recent Hospitalizations:  She was not admitted to the hospital during the last year.       Current Medical Providers:  Patient Care Team:  Martine Evans MD as PCP - General (Family Medicine)  Rizwan Perera MD as Consulting Physician (General Surgery)  Stephen Romero MD as Surgeon (Neurosurgery)  John May MD as Obstetrician (Obstetrics and Gynecology)  Luis Armando Addison PA-C as Physician Assistant (Physician Assistant)    Outpatient Medications Prior to Visit   Medication Sig Dispense Refill   • acetaminophen (TYLENOL) 500 MG tablet Take 500 mg by mouth Every 6 (Six) Hours As Needed for mild pain (1-3).     • albuterol sulfate HFA (Ventolin HFA) 108 (90 Base) MCG/ACT inhaler Inhale 2 puffs Every 6 (Six) Hours As Needed for Wheezing or Shortness of Air. 18 g 0   • celecoxib (CeleBREX) 200 MG capsule TAKE 1 CAPSULE BY MOUTH TWICE DAILY 180 capsule 3   • chlorthalidone (HYGROTON) 25 MG tablet Take 1 tablet by mouth Daily. Indications: Edema, High Blood Pressure Disorder 30 tablet 3   • Cholecalciferol (Vitamin D3) 1.25 MG (58390 UT) capsule Take 1 capsule by mouth Every 7 (Seven) Days. 12 capsule 1   • clotrimazole (LOTRIMIN) 1 % cream Apply  topically to the appropriate area as directed 2 (Two) Times a Day. 60 g 3    • diazePAM (VALIUM) 5 MG tablet Take 1 tablet by mouth 3 (Three) Times a Day As Needed for Muscle Spasms. 90 tablet 1   • DULoxetine (CYMBALTA) 60 MG capsule TAKE 1 CAPSULE BY MOUTH DAILY 90 capsule 3   • galcanezumab-gnlm (Emgality) 120 MG/ML auto-injector pen Inject 1 mL under the skin into the appropriate area as directed Every 30 (Thirty) Days. 3 pen 4   • HYDROcodone-acetaminophen (NORCO)  MG per tablet Take 1 tablet by mouth Every 8 (Eight) Hours As Needed for Moderate Pain  or Severe Pain .     • ketoconazole (NIZORAL) 2 % shampoo Apply to affected area of damp skin, lather, leave on 5 min and rinse. 120 mL 1   • Levonorgestrel (MIRENA, 52 MG, IU) by Intrauterine route.     • levothyroxine (Synthroid) 50 MCG tablet Take 1 tablet by mouth Daily. Take on empty stomach at least 30 min before eating and other meds (for best absorption), for underactive thyroid 90 tablet 3   • metoprolol succinate XL (Toprol XL) 50 MG 24 hr tablet Take 1 tablet by mouth Daily. Indications: High Blood Pressure Disorder 90 tablet 3   • methocarbamol (ROBAXIN) 500 MG tablet Take 1 tablet by mouth 4 (Four) Times a Day. 360 tablet 3   • pregabalin (LYRICA) 75 MG capsule Take 1 capsule by mouth 2 (Two) Times a Day. 180 capsule 1     No facility-administered medications prior to visit.       Opioid medication/s are on active medication list.  and I have evaluated her active treatment plan and pain score trends (see table).  There were no vitals filed for this visit.  I have reviewed the chart for potential of high risk medication and harmful drug interactions in the elderly.            Aspirin is not on active medication list.  Aspirin use is not indicated based on review of current medical condition/s. Risk of harm outweighs potential benefits.  .    Patient Active Problem List   Diagnosis   • Anxiety   • Headache, migraine   • Affective disorder (HCC)   • Goiter, nontoxic, multinodular   • Cervical disc herniation   • Chronic  "neck pain   • Cervical radiculopathy   • Spinal stenosis in cervical region   • Essential hypertension   • Chronic pain of both knees   • Fibrocystic breast changes   • IgE deficiency (HCC)     Advance Care Planning  Advance Directive is not on file.  ACP discussion was held with the patient during this visit. Patient does not have an advance directive, declines further assistance.    Review of Systems   Constitutional: Positive for fatigue. Negative for fever.   HENT: Positive for dental problem.    Musculoskeletal: Positive for arthralgias and gait problem (bought cane).   Psychiatric/Behavioral: Negative for suicidal ideas. The patient is nervous/anxious.         High stress in life   All other systems reviewed and are negative.       Objective      Vitals:    07/28/22 0957   BP: 130/84  Comment: pt was running late to appt   BP Location: Right arm   Patient Position: Sitting   Cuff Size: Adult   Pulse: 73   Resp: 16   Temp: 97.5 °F (36.4 °C)   TempSrc: Temporal   SpO2: 98%   Weight: 94.6 kg (208 lb 9.6 oz)   Height: 165.1 cm (65\")     Estimated body mass index is 34.71 kg/m² as calculated from the following:    Height as of this encounter: 165.1 cm (65\").    Weight as of this encounter: 94.6 kg (208 lb 9.6 oz).    Class 2 Severe Obesity (BMI >=35 and <=39.9). Obesity-related health conditions include the following: hypertension, dyslipidemias and osteoarthritis. Obesity is unchanged. BMI is is above average; BMI management plan is completed. We discussed info on AVS.      Does the patient have evidence of cognitive impairment? No    Physical Exam  Vitals and nursing note reviewed.   Constitutional:       General: She is not in acute distress.     Appearance: Normal appearance. She is well-developed and well-groomed. She is obese. She is not ill-appearing, toxic-appearing or diaphoretic.      Interventions: Face mask in place.   HENT:      Head: Normocephalic and atraumatic. Hair is normal.      Right Ear: " Hearing, tympanic membrane, ear canal and external ear normal.      Left Ear: Hearing, tympanic membrane, ear canal and external ear normal.   Eyes:      General: Lids are normal. Gaze aligned appropriately. No scleral icterus.        Right eye: No discharge.         Left eye: No discharge.      Extraocular Movements: Extraocular movements intact.      Conjunctiva/sclera: Conjunctivae normal.      Pupils: Pupils are equal, round, and reactive to light.   Neck:      Thyroid: No thyromegaly.      Trachea: Trachea and phonation normal. No tracheal deviation.   Cardiovascular:      Rate and Rhythm: Normal rate and regular rhythm.      Heart sounds: Normal heart sounds. No murmur heard.    No friction rub. No gallop.   Pulmonary:      Effort: Pulmonary effort is normal.      Breath sounds: Normal breath sounds and air entry.   Abdominal:      General: Bowel sounds are normal. There is no distension.      Palpations: Abdomen is soft. Abdomen is not rigid. There is no mass.      Tenderness: There is no abdominal tenderness. There is no guarding or rebound.   Musculoskeletal:         General: No tenderness or deformity.      Cervical back: Neck supple.      Right lower leg: No edema.      Left lower leg: No edema.   Skin:     General: Skin is warm.      Capillary Refill: Capillary refill takes less than 2 seconds.      Coloration: Skin is not cyanotic, jaundiced or pale.      Findings: No erythema or rash.      Nails: There is no clubbing.   Neurological:      General: No focal deficit present.      Mental Status: She is alert and oriented to person, place, and time.      Cranial Nerves: No cranial nerve deficit.      Motor: No tremor, atrophy, abnormal muscle tone or seizure activity.      Gait: Gait is intact. Gait normal.   Psychiatric:         Attention and Perception: Attention and perception normal.         Mood and Affect: Mood is anxious. Affect is tearful.         Speech: Speech normal.         Behavior: Behavior  normal. Behavior is cooperative.         Thought Content: Thought content normal.         Cognition and Memory: Cognition and memory normal.         Judgment: Judgment normal.         Lab Results   Component Value Date    CHLPL 218 (H) 06/30/2022    TRIG 193 (H) 06/30/2022    HDL 37 (L) 06/30/2022     (H) 06/30/2022    VLDL 35 06/30/2022    HGBA1C 5.70 (H) 06/30/2022         ECG 12 Lead    Date/Time: 7/28/2022 10:11 AM  Performed by: Martine Evans MD  Authorized by: Martine Evans MD   Comparison: not compared with previous ECG   Previous ECG: no previous ECG available  Rhythm: sinus rhythm  Rate: normal  BPM: 68  Conduction: conduction normal  ST Segments: ST segments normal  T Waves: T waves normal  QRS axis: normal  Other: no other findings    Clinical impression: normal ECG               HEALTH RISK ASSESSMENT    Smoking Status:  Social History     Tobacco Use   Smoking Status Current Every Day Smoker   • Packs/day: 1.50   • Years: 15.00   • Pack years: 22.50   • Types: Cigarettes   Smokeless Tobacco Never Used     Alcohol Consumption:  Social History     Substance and Sexual Activity   Alcohol Use No       Fall Risk Screen:    STEADI Fall Risk Assessment was completed, and patient is at MODERATE risk for falls. Assessment completed on:7/28/2022    Depression Screen:   PHQ-2/PHQ-9 Depression Screening 7/28/2022   Retired PHQ-9 Total Score -   Retired Total Score -   Little Interest or Pleasure in Doing Things 0-->not at all   Feeling Down, Depressed or Hopeless 0-->not at all   PHQ-9: Brief Depression Severity Measure Score 0       Health Habits and Functional and Cognitive Screening:  Functional & Cognitive Status 7/28/2022   Do you have difficulty preparing food and eating? No   Do you have difficulty bathing yourself, getting dressed or grooming yourself? No   Do you have difficulty using the toilet? No   Do you have difficulty moving around from place to place? No   Do you have trouble  with steps or getting out of a bed or a chair? No   Current Diet Well Balanced Diet   Dental Exam Up to date   Eye Exam Up to date   Exercise (times per week) 2 times per week   Current Exercises Include House Cleaning   Do you need help using the phone?  No   Are you deaf or do you have serious difficulty hearing?  No   Do you need help with transportation? No   Do you need help shopping? No   Do you need help preparing meals?  No   Do you need help with housework?  No   Do you need help with laundry? No   Do you need help taking your medications? No   Do you need help managing money? No   Do you ever drive or ride in a car without wearing a seat belt? No   Have you felt unusual stress, anger or loneliness in the last month? No   Who do you live with? Spouse   If you need help, do you have trouble finding someone available to you? No   Have you been bothered in the last four weeks by sexual problems? No   Do you have difficulty concentrating, remembering or making decisions? No       Visual Acuity:    No exam data present    Age-appropriate Screening Schedule:  Refer to the list below for future screening recommendations based on patient's age, sex and/or medical conditions. Orders for these recommended tests are listed in the plan section. The patient has been provided with a written plan.    Health Maintenance   Topic Date Due   • INFLUENZA VACCINE  10/01/2022   • LIPID PANEL  06/30/2023   • MAMMOGRAM  06/21/2024   • PAP SMEAR  07/06/2025   • TDAP/TD VACCINES (3 - Td or Tdap) 12/20/2028          Assessment & Plan   CMS Preventative Services Quick Reference  Risk Factors Identified During Encounter  Chronic Pain   Depression/Dysphoria  Fall Risk-High or Moderate  Immunizations Discussed/Encouraged (specific Immunizations; Prevnar 20 (Pneumococcal 20-valent conjugate)  Inactivity/Sedentary  Obesity/Overweight   Polypharmacy  The above risks/problems have been discussed with the patient.  Pertinent information has  been shared with the patient in the After Visit Summary.  Follow up plans and orders are seen below in the Assessment/Plan Section.    Diagnoses and all orders for this visit:    1. Welcome to Medicare preventive visit (Primary)  -     ECG 12 Lead    2. IgE deficiency (HCC)  Comments:  found on last labs; hx of recurrent infections    3. Acquired hypothyroidism  Comments:  check TSH next visit    4. Essential hypertension  Comments:  improved wtih beta blocker, continue current med, goal is <130/80    5. Migraine without status migrainosus, not intractable, unspecified migraine type  -     methocarbamol (ROBAXIN) 500 MG tablet; Take 1 tablet by mouth 4 (Four) Times a Day As Needed. Muscle spasm  Dispense: 360 tablet; Refill: 3    6. Moderate episode of recurrent major depressive disorder (HCC)  Comments:  continue cymbalta daily    7. Mixed hyperlipidemia  Comments:  labs next visit to recheck lipids, on statin    8. Class 1 obesity with serious comorbidity and body mass index (BMI) of 34.0 to 34.9 in adult, unspecified obesity type  Comments:  info on mediterranean diet via avs    9. Need for pneumococcal vaccination  -     Pneumococcal Conjugate Vaccine 20-Valent All    10. Spinal stenosis in cervical region  -     methocarbamol (ROBAXIN) 500 MG tablet; Take 1 tablet by mouth 4 (Four) Times a Day As Needed. Muscle spasm  Dispense: 360 tablet; Refill: 3  -     pregabalin (LYRICA) 75 MG capsule; Take 1 capsule by mouth 2 (Two) Times a Day As Needed. Nerve pain  Dispense: 180 capsule; Refill: 1    11. Cervical radiculopathy  -     methocarbamol (ROBAXIN) 500 MG tablet; Take 1 tablet by mouth 4 (Four) Times a Day As Needed. Muscle spasm  Dispense: 360 tablet; Refill: 3  -     pregabalin (LYRICA) 75 MG capsule; Take 1 capsule by mouth 2 (Two) Times a Day As Needed. Nerve pain  Dispense: 180 capsule; Refill: 1    12. Tobacco use  Comments:  info on cessation via avs        Follow Up:   Return in about 6 weeks (around  9/8/2022) for Blood Pressure follow up, repeat labs for thyroid, cholesterol.     An After Visit Summary and PPPS were made available to the patient.

## 2022-08-09 DIAGNOSIS — I10 ESSENTIAL HYPERTENSION: ICD-10-CM

## 2022-08-09 RX ORDER — CHLORTHALIDONE 25 MG/1
TABLET ORAL
Qty: 30 TABLET | Refills: 3 | Status: SHIPPED | OUTPATIENT
Start: 2022-08-09 | End: 2022-12-14

## 2022-08-12 ENCOUNTER — HOSPITAL ENCOUNTER (OUTPATIENT)
Dept: MRI IMAGING | Facility: HOSPITAL | Age: 42
Discharge: HOME | End: 2022-08-12
Admitting: PHYSICIAN ASSISTANT

## 2022-08-12 PROCEDURE — 73721 MRI JNT OF LWR EXTRE W/O DYE: CPT

## 2022-08-15 ENCOUNTER — OFFICE VISIT (OUTPATIENT)
Dept: ORTHOPEDIC SURGERY | Facility: CLINIC | Age: 42
End: 2022-08-15

## 2022-08-15 VITALS — HEIGHT: 65 IN | BODY MASS INDEX: 34.66 KG/M2 | WEIGHT: 208 LBS | TEMPERATURE: 97.3 F

## 2022-08-15 DIAGNOSIS — M22.2X2 PATELLOFEMORAL DISORDER OF BOTH KNEES: Primary | ICD-10-CM

## 2022-08-15 DIAGNOSIS — M17.0 PRIMARY OSTEOARTHRITIS OF BOTH KNEES: ICD-10-CM

## 2022-08-15 DIAGNOSIS — M22.2X1 PATELLOFEMORAL DISORDER OF BOTH KNEES: Primary | ICD-10-CM

## 2022-08-15 DIAGNOSIS — M25.562 ARTHRALGIA OF LEFT KNEE: ICD-10-CM

## 2022-08-15 PROCEDURE — 99213 OFFICE O/P EST LOW 20 MIN: CPT | Performed by: PHYSICIAN ASSISTANT

## 2022-08-15 NOTE — PROGRESS NOTES
"Subjective   Patient ID: Heidi Franklin is a 42 y.o. right hand dominant female  Follow-up of the Left Knee (MRI results.)         History of Present Illness  Patient presents to review MRI of left knee. She is experiencing chronic LBP, bilateral knee pain ( L >R). She does endorse a hx of Lumbar Bulging disc and has seen Dr. Romero in remote past ( no lumbar surgery history).   Both knees pop and crack per patient.   She mentions she tried formal PT in past and this made \" knee pain worse\"      Past Medical History:   Diagnosis Date   • Anxiety    • Arthritis    • Carpal tunnel syndrome     right   • Cervical disc herniation 2016   • Encounter for insertion of mirena IUD 2016   • Encounter for insertion of mirena IUD 2016   • Encounter for insertion of mirena IUD 2021   • Essential hypertension 2021   • Headache    • History of Papanicolaou smear of cervix 10/20/2015    WNL   • History of Papanicolaou smear of cervix 2016    WNL   • Low back pain    • Scoliosis         Past Surgical History:   Procedure Laterality Date   • BREAST BIOPSY     • BREAST CYST ASPIRATION     • BREAST SURGERY Right    •  SECTION     • INTERVENTIONAL RADIOLOGY PROCEDURE N/A 2017    Procedure:  myelogram cervical spine;  Surgeon: Moody Fitch MD;  Location: [facility] INVASIVE LOCATION;  Service:    • OTHER SURGICAL HISTORY      Breast Mammatome, Needle Biopsy, ACDF    • SPINE SURGERY  2017, 2018    ACDF C5-6 and C6-7, ACDF C4-5   • SPINE SURGERY  2019    3rd attempt   • US GUIDED CYST ASPIRATION BREAST N/A 2020   • US GUIDED CYST ASPIRATION BREAST N/A 2020   • US GUIDED CYST ASPIRATION BREAST N/A 2021       Family History   Problem Relation Age of Onset   • Cancer Mother         Throat Cancer-   • COPD Mother             • Thyroid disease Mother    • Arthritis Maternal Aunt         Spine   • Lupus Maternal Aunt    • " Arthritis Maternal Aunt         Spine   • Drug abuse Maternal Aunt             • Uterine cancer Maternal Aunt    • Thyroid disease Maternal Aunt    • Arthritis Maternal Grandmother         Spine   • COPD Other        Social History     Socioeconomic History   • Marital status:    Tobacco Use   • Smoking status: Current Every Day Smoker     Packs/day: 1.50     Years: 15.00     Pack years: 22.50     Types: Cigarettes   • Smokeless tobacco: Never Used   Vaping Use   • Vaping Use: Never used   Substance and Sexual Activity   • Alcohol use: No   • Drug use: No   • Sexual activity: Yes     Partners: Male     Birth control/protection: I.U.D.     Comment: Mirena         Current Outpatient Medications:   •  acetaminophen (TYLENOL) 500 MG tablet, Take 500 mg by mouth Every 6 (Six) Hours As Needed for mild pain (1-3)., Disp: , Rfl:   •  albuterol sulfate HFA (Ventolin HFA) 108 (90 Base) MCG/ACT inhaler, Inhale 2 puffs Every 6 (Six) Hours As Needed for Wheezing or Shortness of Air., Disp: 18 g, Rfl: 0  •  celecoxib (CeleBREX) 200 MG capsule, TAKE 1 CAPSULE BY MOUTH TWICE DAILY, Disp: 180 capsule, Rfl: 3  •  chlorthalidone (HYGROTON) 25 MG tablet, TAKE 1 TABLET BY MOUTH DAILY FOR SWELLING OR HIGH BLOOD PRESSURE, Disp: 30 tablet, Rfl: 3  •  Cholecalciferol (Vitamin D3) 1.25 MG (38624 UT) capsule, Take 1 capsule by mouth Every 7 (Seven) Days., Disp: 12 capsule, Rfl: 1  •  clotrimazole (LOTRIMIN) 1 % cream, Apply  topically to the appropriate area as directed 2 (Two) Times a Day., Disp: 60 g, Rfl: 3  •  diazePAM (VALIUM) 5 MG tablet, Take 1 tablet by mouth 3 (Three) Times a Day As Needed for Muscle Spasms., Disp: 90 tablet, Rfl: 1  •  DULoxetine (CYMBALTA) 60 MG capsule, TAKE 1 CAPSULE BY MOUTH DAILY, Disp: 90 capsule, Rfl: 3  •  galcanezumab-gnlm (Emgality) 120 MG/ML auto-injector pen, Inject 1 mL under the skin into the appropriate area as directed Every 30 (Thirty) Days., Disp: 3 pen, Rfl: 4  •   HYDROcodone-acetaminophen (NORCO)  MG per tablet, Take 1 tablet by mouth Every 8 (Eight) Hours As Needed for Moderate Pain  or Severe Pain ., Disp: , Rfl:   •  ketoconazole (NIZORAL) 2 % shampoo, Apply to affected area of damp skin, lather, leave on 5 min and rinse., Disp: 120 mL, Rfl: 1  •  Levonorgestrel (MIRENA, 52 MG, IU), by Intrauterine route., Disp: , Rfl:   •  levothyroxine (Synthroid) 50 MCG tablet, Take 1 tablet by mouth Daily. Take on empty stomach at least 30 min before eating and other meds (for best absorption), for underactive thyroid, Disp: 90 tablet, Rfl: 3  •  methocarbamol (ROBAXIN) 500 MG tablet, Take 1 tablet by mouth 4 (Four) Times a Day As Needed. Muscle spasm, Disp: 360 tablet, Rfl: 3  •  metoprolol succinate XL (Toprol XL) 50 MG 24 hr tablet, Take 1 tablet by mouth Daily. Indications: High Blood Pressure Disorder, Disp: 90 tablet, Rfl: 3  •  pregabalin (LYRICA) 75 MG capsule, Take 1 capsule by mouth 2 (Two) Times a Day As Needed. Nerve pain, Disp: 180 capsule, Rfl: 1    Allergies   Allergen Reactions   • Citalopram Other (See Comments) and Unknown - High Severity       Review of Systems   Constitutional: Negative for fever.   HENT: Negative for dental problem and voice change.    Eyes: Negative for visual disturbance.   Respiratory: Negative for shortness of breath.    Cardiovascular: Negative for chest pain.   Gastrointestinal: Negative for abdominal pain.   Genitourinary: Negative for dysuria.   Musculoskeletal: Positive for arthralgias (left knee) and joint swelling (left knee). Negative for gait problem.   Skin: Negative for rash.   Neurological: Negative for speech difficulty.   Hematological: Does not bruise/bleed easily.   Psychiatric/Behavioral: Negative for confusion.        I have reviewed the medical and surgical history, family history, social history, medications, and/or allergies, and the review of systems of this report.    Objective   Temp 97.3 °F (36.3 °C)   Ht 165.1  "cm (65\")   Wt 94.3 kg (208 lb)   BMI 34.61 kg/m²    Physical Exam  Vitals and nursing note reviewed.   Constitutional:       Appearance: Normal appearance.   Pulmonary:      Effort: Pulmonary effort is normal.   Musculoskeletal:      Right knee: No erythema or ecchymosis. Tenderness present over the medial joint line.      Instability Tests: Anterior drawer test negative. Medial Juju test negative and lateral Juju test negative.      Left knee: No erythema or ecchymosis. Tenderness present over the medial joint line.      Instability Tests: Anterior drawer test negative. Medial Juju test negative and lateral Juju test negative.   Neurological:      Mental Status: She is alert and oriented to person, place, and time.       Right Knee Exam     Range of Motion   Extension: 5   Flexion: 110     Tests   Juju:  Medial - negative Lateral - negative      Left Knee Exam     Range of Motion   Extension: 5   Flexion: 100     Tests   Juju:  Medial - negative Lateral - negative           Extremity DVT signs are negative on physical exam with negative Abril sign, no calf pain, no palpable cords and no skin tone change   Neurologic Exam     Mental Status   Oriented to person, place, and time.        + jessica. Knee patella crepitus        Assessment & Plan   Independent Review of Radiographic Studies:    No new imaging done today.  PACS Images     Radiology Images    Study Result    Narrative & Impression   PROCEDURE: MRI KNEE LEFT  WO CONTRAST-     HISTORY: left knee pain, swelling and crepitus; M22.2X1-Patellofemoral  disorders, right knee; M22.2X2-Patellofemoral disorders, left knee;  M25.562-Pain in left knee; M17.0-Bilateral primary osteoarthritis of  knee     COMPARISON:  None.     TECHNIQUE: Multiplanar, multisequence MRI of the left knee was  performed. No IV contrast was administered.     FINDINGS:  Bones/Cartilage:  No fracture, osteonecrosis or suspicious intraosseous lesions. There is  mild " tricompartmental osteoarthrosis. There is mild chondromalacia and  areas of thinning of the femoral condyles.     Menisci:  The medial and lateral menisci are unremarkable.     Ligaments:  The anterior and posterior cruciate ligaments are normal. The medial  collateral and fibular collateral ligaments are intact. The popliteal  fibular ligament and posterior lateral corner are unremarkable. The  medial and lateral patellofemoral ligament/retinacula are intact.     Muscles/tendons:  The extensor mechanism, including the quadriceps and patellar tendons  are intact. The hamstrings and pes anserine tendons are unremarkable.  The popliteus tendon is intact. The visualized portions of the  gastrocnemius and soleus muscles are unremarkable. The iliotibial band  is intact.     Joint effusion and soft tissues:  There is a small effusion. There is a small Baker's cyst.     Nerves and vessels:  Unremarkable.     IMPRESSION:  No acute osseous abnormality identified. Chronic findings as above.           Images were reviewed, interpreted, and dictated by MUNIRA Hansen  Transcribed by Nakia Mathis PA-C.     This report was signed and finalized on 8/12/2022 4:39 PM by MUNIRA Lovelace.         Procedures       Diagnoses and all orders for this visit:    1. Patellofemoral disorder of both knees (Primary)    2. Arthralgia of left knee    3. Primary osteoarthritis of both knees       Orthopedic activities reviewed and patient expressed appreciation  Discussion of orthopedic goals  Risk, benefits, and merits of treatment alternatives reviewed with the patient and questions answered    Recommendations/Plan:  Exercise, medications, injections, other patient advice, and return appointment as noted.  Patient is encouraged to call or return for any issues or concerns.  I would like for her to be re-checked from Neurosurgery for her Lumbar issues- she will contact [facility] to secure a close appt.   In the  meantime, I will order visco injections for knee.     Patient agreeable to call or return sooner for any concerns.        EMR Dragon-transcription disclaimer:  This encounter note is an electronic transcription of spoken language to printed text.  Electronic transcription of spoken language may permit erroneous or at times nonsensical words or phrases to be inadvertently transcribed.  Although I have reviewed the note for such errors, some may still exist

## 2022-08-16 DIAGNOSIS — M17.0 PRIMARY OSTEOARTHRITIS OF BOTH KNEES: Primary | ICD-10-CM

## 2022-08-16 RX ORDER — HYALURONATE SODIUM 30 MG/2 ML
30 SYRINGE (ML) INTRAARTICULAR WEEKLY
Qty: 12 ML | Refills: 0 | Status: SHIPPED | OUTPATIENT
Start: 2022-08-16 | End: 2022-08-31

## 2022-08-16 NOTE — TELEPHONE ENCOUNTER
Called patient to inform I have sent Orthovisc for jessica knee to [facility] and they will call her to get auth to ship to our office, I will call her back to schedule once received

## 2022-08-18 ENCOUNTER — TELEPHONE (OUTPATIENT)
Dept: ORTHOPEDIC SURGERY | Facility: CLINIC | Age: 42
End: 2022-08-18

## 2022-08-18 DIAGNOSIS — M54.2 CHRONIC NECK PAIN: ICD-10-CM

## 2022-08-18 DIAGNOSIS — M54.12 CERVICAL RADICULOPATHY: ICD-10-CM

## 2022-08-18 DIAGNOSIS — G89.29 CHRONIC NECK PAIN: ICD-10-CM

## 2022-08-18 RX ORDER — CELECOXIB 200 MG/1
200 CAPSULE ORAL 2 TIMES DAILY
Qty: 180 CAPSULE | Refills: 3 | Status: SHIPPED | OUTPATIENT
Start: 2022-08-18

## 2022-08-18 NOTE — TELEPHONE ENCOUNTER
Called pharmacy to set up for Orthovisc delivery to our office, will be delivered next Wed 8/24/22, will call patient to schedule once received

## 2022-08-18 NOTE — TELEPHONE ENCOUNTER
Caller: MUNA    Relationship to patient: [facility]    Best call back number: [phone]    Patient is needing: REPRESENTATIVE REBLANCA TO SPEAK WITH SOMEONE REGARDING AN RX FOR PATIENT/  STATES SHE MAY ALSO CALL BACK IN AN HOUR

## 2022-08-30 ENCOUNTER — OFFICE VISIT (OUTPATIENT)
Dept: ORTHOPEDIC SURGERY | Facility: CLINIC | Age: 42
End: 2022-08-30

## 2022-08-30 VITALS — BODY MASS INDEX: 34.61 KG/M2 | HEIGHT: 65 IN

## 2022-08-30 DIAGNOSIS — M17.0 PRIMARY OSTEOARTHRITIS OF BOTH KNEES: Primary | ICD-10-CM

## 2022-08-30 PROCEDURE — 20610 DRAIN/INJ JOINT/BURSA W/O US: CPT | Performed by: PHYSICIAN ASSISTANT

## 2022-08-30 RX ORDER — HYDROCODONE BITARTRATE AND ACETAMINOPHEN 5; 325 MG/1; MG/1
1 TABLET ORAL EVERY 8 HOURS PRN
COMMUNITY

## 2022-08-30 NOTE — PROGRESS NOTES
"Subjective   Heidi Franklin is a 42 y.o. female here today for injection therapy.    Chief Complaint   Patient presents with   • Left Knee - Injections   • Right Knee - Injections   patient presents for jessica. Knee visco injections    Past Medical History:   Diagnosis Date   • Anxiety    • Arthritis    • Carpal tunnel syndrome     right   • Cervical disc herniation 2016   • Encounter for insertion of mirena IUD 2016   • Encounter for insertion of mirena IUD 2016   • Encounter for insertion of mirena IUD 2021   • Essential hypertension 2021   • Headache    • History of Papanicolaou smear of cervix 10/20/2015    WNL   • History of Papanicolaou smear of cervix 2016    WNL   • Low back pain    • Scoliosis          Past Surgical History:   Procedure Laterality Date   • BREAST BIOPSY     • BREAST CYST ASPIRATION     • BREAST SURGERY Right    •  SECTION     • INTERVENTIONAL RADIOLOGY PROCEDURE N/A 2017    Procedure:  myelogram cervical spine;  Surgeon: Moody Fitch MD;  Location: [facility] INVASIVE LOCATION;  Service:    • OTHER SURGICAL HISTORY      Breast Mammatome, Needle Biopsy, ACDF    • SPINE SURGERY  2017, 2018    ACDF C5-6 and C6-7, ACDF C4-5   • SPINE SURGERY  2019    3rd attempt   • US GUIDED CYST ASPIRATION BREAST N/A 2020   • US GUIDED CYST ASPIRATION BREAST N/A 2020   • US GUIDED CYST ASPIRATION BREAST N/A 2021       Allergies   Allergen Reactions   • Citalopram Other (See Comments) and Unknown - High Severity       Objective   Ht 165.1 cm (65\")   BMI 34.61 kg/m²    Physical Exam    Skin exam stable with no erythema, ecchymosis or rash.  No new swelling.  No motor or sensory changes.  Distal pulse intact.    Large Joint Arthrocentesis: R knee  Date/Time: 2022 3:17 PM  Consent given by: patient  Site marked: site marked  Timeout: Immediately prior to procedure a time out was called to verify the correct " patient, procedure, equipment, support staff and site/side marked as required   Supporting Documentation  Indications: pain   Procedure Details  Location: knee - R knee  Preparation: Patient was prepped and draped in the usual sterile fashion  Needle size: 22 G  Approach: anterolateral  Medications administered: 30 mg Hyaluronan 30 MG/2ML  Patient tolerance: patient tolerated the procedure well with no immediate complications    Large Joint Arthrocentesis: L knee  Date/Time: 8/30/2022 3:18 PM  Consent given by: patient  Site marked: site marked  Timeout: Immediately prior to procedure a time out was called to verify the correct patient, procedure, equipment, support staff and site/side marked as required   Supporting Documentation  Indications: pain   Procedure Details  Location: knee - L knee  Preparation: Patient was prepped and draped in the usual sterile fashion  Needle size: 22 G (21g)  Approach: anterolateral  Medications administered: 30 mg Hyaluronan 30 MG/2ML  Patient tolerance: patient tolerated the procedure well with no immediate complications          Assessment & Plan      Diagnosis Plan   1. Primary osteoarthritis of both knees         Discussion of orthopaedic goals and activities and patient expressed appreciation.  Guided on proper techniques for mobility, strength, agility and/or conditioning exercises  Ice, heat, and/or modalities as beneficial  Watch for signs and symptoms of infection  Call or notify for any adverse effect from injection therapy    Recommendations:  Follow up in 1 week    Patient agreeable to call or return sooner for any concerns.

## 2022-09-07 ENCOUNTER — OFFICE VISIT (OUTPATIENT)
Dept: ORTHOPEDIC SURGERY | Facility: CLINIC | Age: 42
End: 2022-09-07

## 2022-09-07 ENCOUNTER — TRANSCRIBE ORDERS (OUTPATIENT)
Dept: ADMINISTRATIVE | Facility: HOSPITAL | Age: 42
End: 2022-09-07

## 2022-09-07 VITALS — HEIGHT: 65 IN | BODY MASS INDEX: 34.66 KG/M2 | WEIGHT: 208 LBS

## 2022-09-07 DIAGNOSIS — R20.0 NUMBNESS AND TINGLING: ICD-10-CM

## 2022-09-07 DIAGNOSIS — M54.2 CERVICALGIA: ICD-10-CM

## 2022-09-07 DIAGNOSIS — M50.30 DEGENERATIVE DISC DISEASE, CERVICAL: ICD-10-CM

## 2022-09-07 DIAGNOSIS — M50.30 DISC DISEASE, DEGENERATIVE, CERVICAL: Primary | ICD-10-CM

## 2022-09-07 DIAGNOSIS — M47.816 LUMBAR SPONDYLOSIS: ICD-10-CM

## 2022-09-07 DIAGNOSIS — M17.0 PRIMARY OSTEOARTHRITIS OF BOTH KNEES: Primary | ICD-10-CM

## 2022-09-07 DIAGNOSIS — R20.2 NUMBNESS AND TINGLING: ICD-10-CM

## 2022-09-07 DIAGNOSIS — M51.36 DEGENERATIVE DISC DISEASE, LUMBAR: ICD-10-CM

## 2022-09-07 DIAGNOSIS — Z98.1 HISTORY OF FUSION OF CERVICAL SPINE: ICD-10-CM

## 2022-09-07 PROCEDURE — 20610 DRAIN/INJ JOINT/BURSA W/O US: CPT | Performed by: PHYSICIAN ASSISTANT

## 2022-09-07 NOTE — PROGRESS NOTES
"Subjective   Heidi Franklin is a 42 y.o. female here today for injection therapy.    Chief Complaint   Patient presents with   • Left Knee - Injections   • Right Knee - Injections     Orthovisc # 2     Patient presents for repeat bilateral knee visco injections  Past Medical History:   Diagnosis Date   • Anxiety    • Arthritis    • Carpal tunnel syndrome     right   • Cervical disc herniation 2016   • Encounter for insertion of mirena IUD 2016   • Encounter for insertion of mirena IUD 2016   • Encounter for insertion of mirena IUD 2021   • Essential hypertension 2021   • Headache    • History of Papanicolaou smear of cervix 10/20/2015    WNL   • History of Papanicolaou smear of cervix 2016    WNL   • Low back pain    • Scoliosis          Past Surgical History:   Procedure Laterality Date   • BREAST BIOPSY     • BREAST CYST ASPIRATION     • BREAST SURGERY Right    •  SECTION     • INTERVENTIONAL RADIOLOGY PROCEDURE N/A 2017    Procedure:  myelogram cervical spine;  Surgeon: Moody Fitch MD;  Location: [facility] INVASIVE LOCATION;  Service:    • OTHER SURGICAL HISTORY      Breast Mammatome, Needle Biopsy, ACDF    • SPINE SURGERY  2017, 2018    ACDF C5-6 and C6-7, ACDF C4-5   • SPINE SURGERY  2019    3rd attempt   • US GUIDED CYST ASPIRATION BREAST N/A 2020   • US GUIDED CYST ASPIRATION BREAST N/A 2020   • US GUIDED CYST ASPIRATION BREAST N/A 2021       Allergies   Allergen Reactions   • Citalopram Other (See Comments) and Unknown - High Severity       Objective   Ht 165.1 cm (65\")   Wt 94.3 kg (208 lb)   BMI 34.61 kg/m²    Physical Exam    Skin exam stable with no erythema, ecchymosis or rash.  No new swelling.  No motor or sensory changes.  Distal pulse intact.    Large Joint Arthrocentesis: R knee  Date/Time: 2022 3:08 PM  Consent given by: patient  Site marked: site marked  Timeout: Immediately prior to " procedure a time out was called to verify the correct patient, procedure, equipment, support staff and site/side marked as required   Supporting Documentation  Indications: pain   Procedure Details  Location: knee - R knee  Preparation: Patient was prepped and draped in the usual sterile fashion  Needle size: 22 G  Approach: anterolateral  Medications administered: 30 mg Hyaluronan 30 MG/2ML  Patient tolerance: patient tolerated the procedure well with no immediate complications    Large Joint Arthrocentesis: L knee  Date/Time: 9/7/2022 3:09 PM  Consent given by: patient  Site marked: site marked  Timeout: Immediately prior to procedure a time out was called to verify the correct patient, procedure, equipment, support staff and site/side marked as required   Supporting Documentation  Indications: pain   Procedure Details  Location: knee - L knee  Preparation: Patient was prepped and draped in the usual sterile fashion  Needle size: 22 G  Approach: anterolateral  Medications administered: 30 mg Hyaluronan 30 MG/2ML  Patient tolerance: patient tolerated the procedure well with no immediate complications          Assessment & Plan      Diagnosis Plan   1. Primary osteoarthritis of both knees         Discussion of orthopaedic goals and activities and patient expressed appreciation.  Guided on proper techniques for mobility, strength, agility and/or conditioning exercises  Ice, heat, and/or modalities as beneficial  Watch for signs and symptoms of infection  Call or notify for any adverse effect from injection therapy    Recommendations:  Follow up in 1 week    Patient agreeable to call or return sooner for any concerns.

## 2022-09-08 ENCOUNTER — OFFICE VISIT (OUTPATIENT)
Dept: INTERNAL MEDICINE | Facility: CLINIC | Age: 42
End: 2022-09-08

## 2022-09-08 VITALS
HEIGHT: 65 IN | DIASTOLIC BLOOD PRESSURE: 80 MMHG | HEART RATE: 72 BPM | SYSTOLIC BLOOD PRESSURE: 124 MMHG | RESPIRATION RATE: 16 BRPM | WEIGHT: 218.5 LBS | TEMPERATURE: 97.3 F | OXYGEN SATURATION: 97 % | BODY MASS INDEX: 36.4 KG/M2

## 2022-09-08 DIAGNOSIS — E55.9 VITAMIN D DEFICIENCY: ICD-10-CM

## 2022-09-08 DIAGNOSIS — R79.9 ABNORMAL BLOOD CHEMISTRY: ICD-10-CM

## 2022-09-08 DIAGNOSIS — I10 ESSENTIAL HYPERTENSION: Primary | ICD-10-CM

## 2022-09-08 DIAGNOSIS — E03.9 ACQUIRED HYPOTHYROIDISM: ICD-10-CM

## 2022-09-08 DIAGNOSIS — E78.2 MIXED HYPERLIPIDEMIA: ICD-10-CM

## 2022-09-08 DIAGNOSIS — G43.909 MIGRAINE WITHOUT STATUS MIGRAINOSUS, NOT INTRACTABLE, UNSPECIFIED MIGRAINE TYPE: ICD-10-CM

## 2022-09-08 DIAGNOSIS — Z51.81 MEDICATION MONITORING ENCOUNTER: ICD-10-CM

## 2022-09-08 PROCEDURE — 99214 OFFICE O/P EST MOD 30 MIN: CPT | Performed by: FAMILY MEDICINE

## 2022-09-08 RX ORDER — ERENUMAB-AOOE 140 MG/ML
140 INJECTION, SOLUTION SUBCUTANEOUS
Qty: 1 ML | Refills: 11 | Status: SHIPPED | OUTPATIENT
Start: 2022-09-08

## 2022-09-08 NOTE — PROGRESS NOTES
"Chief Complaint  Hypertension (6 week follow up, labs)    Subjective        Heidi Franklin presents to [facility]  Blood pressure is improved, running similar to what we are seeing when she checks it.  Was lower, 120s over 70s at recent orthopedic visit.    Insurance is not wanting to cover the injections for her migraines.  These medicines have worked very well.  Per patient they want her to use sumatriptan.  Patient has taken this medicine has a lot of side effects.    Due to have her labs rechecked, feels the low-dose of thyroid medicine has helped some with her energy and how she feels overall.      Objective   Vital Signs:  /80 (BP Location: Left arm, Patient Position: Sitting, Cuff Size: Adult)   Pulse 72   Temp 97.3 °F (36.3 °C) (Temporal)   Resp 16   Ht 165.1 cm (65\")   Wt 99.1 kg (218 lb 8 oz)   SpO2 97%   BMI 36.36 kg/m²   Estimated body mass index is 36.36 kg/m² as calculated from the following:    Height as of this encounter: 165.1 cm (65\").    Weight as of this encounter: 99.1 kg (218 lb 8 oz).          Physical Exam  Vitals and nursing note reviewed.   Constitutional:       General: She is not in acute distress.     Appearance: Normal appearance. She is well-developed and well-groomed. She is obese. She is not ill-appearing, toxic-appearing or diaphoretic.      Interventions: Face mask in place.   HENT:      Head: Normocephalic and atraumatic.      Right Ear: Hearing normal.      Left Ear: Hearing normal.   Eyes:      General: Lids are normal. No scleral icterus.     Extraocular Movements: Extraocular movements intact.   Neck:      Trachea: Phonation normal.   Pulmonary:      Effort: Pulmonary effort is normal.   Musculoskeletal:      Cervical back: Neck supple.   Skin:     Coloration: Skin is not jaundiced or pale.   Neurological:      General: No focal deficit present.      Mental Status: She is alert and oriented to person, place, and time.      Motor: " Motor function is intact.   Psychiatric:         Attention and Perception: Attention and perception normal.         Mood and Affect: Mood and affect normal.         Speech: Speech normal.         Behavior: Behavior normal. Behavior is cooperative.         Thought Content: Thought content normal.         Cognition and Memory: Cognition and memory normal.         Judgment: Judgment normal.        Result Review :                Assessment and Plan   Diagnoses and all orders for this visit:    1. Essential hypertension (Primary)  Comments:  Improved, continue monitoring blood pressure.  Goal is less than 130/80  Orders:  -     TSH+Free T4  -     T4  -     Triiodothyronine (T3) Total & Free    2. Migraine without status migrainosus, not intractable, unspecified migraine type  -     Erenumab-aooe (Aimovig) 140 MG/ML prefilled syringe; Inject 1 mL under the skin into the appropriate area as directed Every 30 (Thirty) Days.  Dispense: 1 mL; Refill: 11    3. Acquired hypothyroidism  -     TSH+Free T4  -     T4  -     Triiodothyronine (T3) Total & Free    4. Mixed hyperlipidemia  -     Lipid Panel    5. Vitamin D deficiency  Comments:  Discussed previous level, greater than 60.  Needs to be on daily dose to maintain normal level, not once weekly high-dose  Orders:  -     Cholecalciferol 25 MCG (1000 UT) capsule; Take 1 capsule by mouth Daily. Indications: Vitamin D Deficiency  Dispense: 90 capsule; Refill: 3    6. Abnormal blood chemistry  -     TSH+Free T4  -     T4  -     Triiodothyronine (T3) Total & Free  -     Vitamin B12 & Folate    7. Medication monitoring encounter  -     TSH+Free T4  -     T4  -     Triiodothyronine (T3) Total & Free  -     Vitamin B12 & Folate    Sample of Aimovig provided.  Need to do prior authorization on the monthly injections.  These meds worked very well for the patient.  She has failed other preventive medications.  Sumatriptan is not a preventive medicine and she does not tolerate it.          Follow Up   Return in about 3 months (around 12/8/2022) for Blood Pressure follow up.  Patient was given instructions and counseling regarding her condition or for health maintenance advice. Please see specific information pulled into the AVS if appropriate.

## 2022-09-09 LAB
CHOLEST SERPL-MCNC: 172 MG/DL (ref 0–200)
FOLATE SERPL-MCNC: 8.87 NG/ML (ref 4.78–24.2)
HDLC SERPL-MCNC: 34 MG/DL (ref 40–60)
LDLC SERPL CALC-MCNC: 102 MG/DL (ref 0–100)
T3 SERPL-MCNC: 82 NG/DL (ref 71–180)
T3FREE SERPL-MCNC: 2.5 PG/ML (ref 2–4.4)
T4 FREE SERPL-MCNC: 1.11 NG/DL (ref 0.93–1.7)
T4 SERPL-MCNC: 7.2 UG/DL (ref 4.5–12)
TRIGL SERPL-MCNC: 208 MG/DL (ref 0–150)
TSH SERPL DL<=0.005 MIU/L-ACNC: 0.7 UIU/ML (ref 0.27–4.2)
VIT B12 SERPL-MCNC: 459 PG/ML (ref 211–946)
VLDLC SERPL CALC-MCNC: 36 MG/DL (ref 5–40)

## 2022-09-13 ENCOUNTER — PRIOR AUTHORIZATION (OUTPATIENT)
Dept: INTERNAL MEDICINE | Facility: CLINIC | Age: 42
End: 2022-09-13

## 2022-09-14 ENCOUNTER — OFFICE VISIT (OUTPATIENT)
Dept: ORTHOPEDIC SURGERY | Facility: CLINIC | Age: 42
End: 2022-09-14

## 2022-09-14 VITALS — WEIGHT: 218 LBS | TEMPERATURE: 97.5 F | HEIGHT: 65 IN | BODY MASS INDEX: 36.32 KG/M2

## 2022-09-14 DIAGNOSIS — M17.0 PRIMARY OSTEOARTHRITIS OF BOTH KNEES: Primary | ICD-10-CM

## 2022-09-14 PROCEDURE — 20610 DRAIN/INJ JOINT/BURSA W/O US: CPT | Performed by: PHYSICIAN ASSISTANT

## 2022-09-14 NOTE — PROGRESS NOTES
"Subjective   Heidi Franklin is a 42 y.o. female here today for injection therapy.    Chief Complaint   Patient presents with   • Left Knee - Injections     Orthovisc #3   • Right Knee - Injections     Orthovisc #3     Patient presents for bilateral knee Visco injection #3    Past Medical History:   Diagnosis Date   • Anxiety    • Arthritis    • Carpal tunnel syndrome     right   • Cervical disc herniation 2016   • Encounter for insertion of mirena IUD 2016   • Encounter for insertion of mirena IUD 2016   • Encounter for insertion of mirena IUD 2021   • Essential hypertension 2021   • Headache    • History of Papanicolaou smear of cervix 10/20/2015    WNL   • History of Papanicolaou smear of cervix 2016    WNL   • Low back pain    • Scoliosis          Past Surgical History:   Procedure Laterality Date   • BREAST BIOPSY     • BREAST CYST ASPIRATION     • BREAST SURGERY Right    •  SECTION     • INTERVENTIONAL RADIOLOGY PROCEDURE N/A 2017    Procedure:  myelogram cervical spine;  Surgeon: Moody Fitch MD;  Location: [facility] INVASIVE LOCATION;  Service:    • OTHER SURGICAL HISTORY      Breast Mammatome, Needle Biopsy, ACDF    • SPINE SURGERY  2017, 2018    ACDF C5-6 and C6-7, ACDF C4-5   • SPINE SURGERY  2019    3rd attempt   • US GUIDED CYST ASPIRATION BREAST N/A 2020   • US GUIDED CYST ASPIRATION BREAST N/A 2020   • US GUIDED CYST ASPIRATION BREAST N/A 2021       Allergies   Allergen Reactions   • Citalopram Other (See Comments) and Unknown - High Severity       Objective   Temp 97.5 °F (36.4 °C)   Ht 165.1 cm (65\")   Wt 98.9 kg (218 lb)   BMI 36.28 kg/m²    Physical Exam    Skin exam stable with no erythema, ecchymosis or rash.  No new swelling.  No motor or sensory changes.  Distal pulse intact.    Large Joint Arthrocentesis: R knee  Date/Time: 2022 3:09 PM  Consent given by: patient  Site marked: site " marked  Timeout: Immediately prior to procedure a time out was called to verify the correct patient, procedure, equipment, support staff and site/side marked as required   Supporting Documentation  Indications: pain   Procedure Details  Location: knee - R knee  Preparation: Patient was prepped and draped in the usual sterile fashion  Needle gauge: 21 G.  Approach: anterolateral  Medications administered: 30 mg Hyaluronan 30 MG/2ML  Patient tolerance: patient tolerated the procedure well with no immediate complications    Large Joint Arthrocentesis: L knee  Date/Time: 9/14/2022 3:10 PM  Consent given by: patient  Site marked: site marked  Timeout: Immediately prior to procedure a time out was called to verify the correct patient, procedure, equipment, support staff and site/side marked as required   Supporting Documentation  Indications: pain   Procedure Details  Location: knee - L knee  Preparation: Patient was prepped and draped in the usual sterile fashion  Needle gauge: 21 G.  Approach: anterolateral  Medications administered: 30 mg Hyaluronan 30 MG/2ML  Patient tolerance: patient tolerated the procedure well with no immediate complications          Assessment & Plan      Diagnosis Plan   1. Primary osteoarthritis of both knees         Discussion of orthopaedic goals and activities and patient expressed appreciation.  Guided on proper techniques for mobility, strength, agility and/or conditioning exercises  Ice, heat, and/or modalities as beneficial  Watch for signs and symptoms of infection  Call or notify for any adverse effect from injection therapy    Recommendations:  Follow up in 6 months or prn  Patient agreeable to call or return sooner for any concerns.

## 2022-10-07 ENCOUNTER — APPOINTMENT (OUTPATIENT)
Dept: CT IMAGING | Facility: HOSPITAL | Age: 42
End: 2022-10-07

## 2022-10-07 ENCOUNTER — HOSPITAL ENCOUNTER (OUTPATIENT)
Dept: MRI IMAGING | Facility: HOSPITAL | Age: 42
End: 2022-10-07

## 2022-10-07 ENCOUNTER — APPOINTMENT (OUTPATIENT)
Dept: MRI IMAGING | Facility: HOSPITAL | Age: 42
End: 2022-10-07

## 2022-10-12 ENCOUNTER — APPOINTMENT (OUTPATIENT)
Dept: CT IMAGING | Facility: HOSPITAL | Age: 42
End: 2022-10-12

## 2022-11-28 DIAGNOSIS — M48.02 SPINAL STENOSIS IN CERVICAL REGION: ICD-10-CM

## 2022-11-28 DIAGNOSIS — M54.12 CERVICAL RADICULOPATHY: ICD-10-CM

## 2022-11-28 RX ORDER — PREGABALIN 75 MG/1
75 CAPSULE ORAL 2 TIMES DAILY
Qty: 180 CAPSULE | Refills: 1 | Status: SHIPPED | OUTPATIENT
Start: 2022-11-28

## 2022-11-28 RX ORDER — PREGABALIN 75 MG/1
75 CAPSULE ORAL 2 TIMES DAILY
Qty: 180 CAPSULE | Refills: 1
Start: 2022-11-28 | End: 2022-11-28 | Stop reason: SDUPTHER

## 2022-11-28 NOTE — TELEPHONE ENCOUNTER
Rx Refill Note  Requested Prescriptions      No prescriptions requested or ordered in this encounter      Last office visit with prescribing clinician: 9/8/2022      Next office visit with prescribing clinician: 12/13/2022            Nate Gabriel MA  11/28/22, 09:25 EST

## 2022-12-13 ENCOUNTER — PATIENT MESSAGE (OUTPATIENT)
Dept: INTERNAL MEDICINE | Facility: CLINIC | Age: 42
End: 2022-12-13

## 2022-12-13 ENCOUNTER — OFFICE VISIT (OUTPATIENT)
Dept: INTERNAL MEDICINE | Facility: CLINIC | Age: 42
End: 2022-12-13

## 2022-12-13 VITALS
WEIGHT: 203.6 LBS | HEIGHT: 65 IN | BODY MASS INDEX: 33.92 KG/M2 | OXYGEN SATURATION: 97 % | TEMPERATURE: 97.3 F | RESPIRATION RATE: 16 BRPM | SYSTOLIC BLOOD PRESSURE: 136 MMHG | HEART RATE: 89 BPM | DIASTOLIC BLOOD PRESSURE: 96 MMHG

## 2022-12-13 DIAGNOSIS — Z23 NEED FOR INFLUENZA VACCINATION: ICD-10-CM

## 2022-12-13 DIAGNOSIS — E66.09 CLASS 1 OBESITY DUE TO EXCESS CALORIES WITH SERIOUS COMORBIDITY AND BODY MASS INDEX (BMI) OF 33.0 TO 33.9 IN ADULT: ICD-10-CM

## 2022-12-13 DIAGNOSIS — Z23 NEED FOR COVID-19 VACCINE: ICD-10-CM

## 2022-12-13 DIAGNOSIS — E55.9 VITAMIN D DEFICIENCY: ICD-10-CM

## 2022-12-13 DIAGNOSIS — Z51.81 MEDICATION MONITORING ENCOUNTER: ICD-10-CM

## 2022-12-13 DIAGNOSIS — E78.2 MIXED HYPERLIPIDEMIA: ICD-10-CM

## 2022-12-13 DIAGNOSIS — E53.8 FOLIC ACID DEFICIENCY: ICD-10-CM

## 2022-12-13 DIAGNOSIS — G95.89 OTHER SPECIFIED DISEASES OF SPINAL CORD: ICD-10-CM

## 2022-12-13 DIAGNOSIS — I10 ESSENTIAL HYPERTENSION: ICD-10-CM

## 2022-12-13 DIAGNOSIS — F33.1 MODERATE EPISODE OF RECURRENT MAJOR DEPRESSIVE DISORDER: Primary | ICD-10-CM

## 2022-12-13 DIAGNOSIS — E03.9 ACQUIRED HYPOTHYROIDISM: ICD-10-CM

## 2022-12-13 DIAGNOSIS — R73.9 HYPERGLYCEMIA: ICD-10-CM

## 2022-12-13 DIAGNOSIS — R79.9 ABNORMAL BLOOD CHEMISTRY: ICD-10-CM

## 2022-12-13 PROCEDURE — 99214 OFFICE O/P EST MOD 30 MIN: CPT | Performed by: FAMILY MEDICINE

## 2022-12-13 PROCEDURE — 0124A COVID-19 (PFIZER) BIVALENT BOOSTER 12+YRS: CPT | Performed by: FAMILY MEDICINE

## 2022-12-13 PROCEDURE — 90686 IIV4 VACC NO PRSV 0.5 ML IM: CPT | Performed by: FAMILY MEDICINE

## 2022-12-13 PROCEDURE — G0008 ADMIN INFLUENZA VIRUS VAC: HCPCS | Performed by: FAMILY MEDICINE

## 2022-12-13 PROCEDURE — 91312 COVID-19 (PFIZER) BIVALENT BOOSTER 12+YRS: CPT | Performed by: FAMILY MEDICINE

## 2022-12-13 RX ORDER — DULOXETIN HYDROCHLORIDE 30 MG/1
30 CAPSULE, DELAYED RELEASE ORAL DAILY
Qty: 90 CAPSULE | Refills: 3 | Status: SHIPPED | OUTPATIENT
Start: 2022-12-13

## 2022-12-13 RX ORDER — DULOXETIN HYDROCHLORIDE 60 MG/1
60 CAPSULE, DELAYED RELEASE ORAL DAILY
Qty: 90 CAPSULE | Refills: 3 | Status: SHIPPED | OUTPATIENT
Start: 2022-12-13

## 2022-12-13 NOTE — PROGRESS NOTES
"Chief Complaint  Hypertension (3 month follow up, hasn't had medication today)    Subjective        Heidi Franklin presents to [facility]  History of Present Illness  Forgot to take blood pressure meds this am  Has a LOT of stress in life  Blood pressure tends to be higher in medical office--white coat hypertension?  Has ability to monitor blood pressure at home  Feels in a funk, not wanting to leave home, do anything  Questions if Cymbalta still helping.      Objective   Vital Signs:  /96 (BP Location: Right arm, Patient Position: Sitting, Cuff Size: Adult)   Pulse 89   Temp 97.3 °F (36.3 °C) (Temporal)   Resp 16   Ht 165.1 cm (65\")   Wt 92.4 kg (203 lb 9.6 oz)   SpO2 97%   BMI 33.88 kg/m²   Estimated body mass index is 33.88 kg/m² as calculated from the following:    Height as of this encounter: 165.1 cm (65\").    Weight as of this encounter: 92.4 kg (203 lb 9.6 oz).    Class 2 Severe Obesity (BMI >=35 and <=39.9). Obesity-related health conditions include the following: hypertension and dyslipidemias. Obesity is unchanged. BMI is is above average; BMI management plan is completed. We discussed info via avs.      Physical Exam  Vitals and nursing note reviewed.   Constitutional:       General: She is not in acute distress.     Appearance: Normal appearance. She is well-developed and well-groomed. She is obese. She is not ill-appearing, toxic-appearing or diaphoretic.      Interventions: Face mask in place.   HENT:      Head: Normocephalic and atraumatic.      Right Ear: Hearing normal.      Left Ear: Hearing normal.   Eyes:      General: Lids are normal. No scleral icterus.     Extraocular Movements: Extraocular movements intact.   Neck:      Trachea: Phonation normal.   Cardiovascular:      Rate and Rhythm: Normal rate and regular rhythm.   Pulmonary:      Effort: Pulmonary effort is normal.      Breath sounds: Normal breath sounds.   Musculoskeletal:      Cervical " back: Neck supple.   Skin:     Coloration: Skin is not jaundiced or pale.   Neurological:      General: No focal deficit present.      Mental Status: She is alert and oriented to person, place, and time.      Motor: Motor function is intact.   Psychiatric:         Attention and Perception: Attention and perception normal.         Mood and Affect: Mood and affect normal.         Speech: Speech normal.         Behavior: Behavior normal. Behavior is cooperative.         Thought Content: Thought content normal.         Cognition and Memory: Cognition and memory normal.         Judgment: Judgment normal.        Result Review :  The following data was reviewed by: Martine Evans MD on 12/13/2022:  Lab Results   Component Value Date    TSH 0.696 09/08/2022     Lab Results   Component Value Date    FREET4 1.11 09/08/2022      Lab Results   Component Value Date    HGBA1C 5.70 (H) 06/30/2022    HGBA1C 5.40 12/29/2021    HGBA1C 5.30 12/20/2018                Assessment and Plan   Diagnoses and all orders for this visit:    1. Moderate episode of recurrent major depressive disorder (HCC) (Primary)  -     DULoxetine (CYMBALTA) 60 MG capsule; Take 1 capsule by mouth Daily. Take with 30 mg to equal 90 mg.  Dispense: 90 capsule; Refill: 3  -     DULoxetine (Cymbalta) 30 MG capsule; Take 1 capsule by mouth Daily. Take with 60 mg to equal 90 mg  Dispense: 90 capsule; Refill: 3  -     TSH+Free T4; Future  -     Folate; Future    2. Essential hypertension  -     TSH+Free T4; Future  -     CBC (No Diff); Future  -     Comprehensive Metabolic Panel; Future    3. Other specified diseases of spinal cord (HCC)  Comments:  s/p surgery by  neurosurgery at ; follow up with specialists as scheduled    4. Acquired hypothyroidism  -     TSH+Free T4; Future    5. Mixed hyperlipidemia  -     Lipid Panel; Future  -     Comprehensive Metabolic Panel; Future    6. Vitamin D deficiency  -     Vitamin D,25-Hydroxy; Future  -     Comprehensive  Metabolic Panel; Future    7. Folic acid deficiency  -     Vitamin B12 Deficiency Cascade; Future  -     CBC (No Diff); Future  -     Folate; Future    8. Class 1 obesity due to excess calories with serious comorbidity and body mass index (BMI) of 33.0 to 33.9 in adult  -     Vitamin D,25-Hydroxy; Future  -     Comprehensive Metabolic Panel; Future    9. Hyperglycemia  -     Hemoglobin A1c; Future  -     Comprehensive Metabolic Panel; Future    10. Abnormal blood chemistry  -     Hemoglobin A1c; Future  -     Lipid Panel; Future  -     TSH+Free T4; Future  -     Vitamin D,25-Hydroxy; Future  -     Vitamin B12 Deficiency Cascade; Future  -     CBC (No Diff); Future  -     Comprehensive Metabolic Panel; Future  -     Folate; Future    11. Medication monitoring encounter  -     Hemoglobin A1c; Future  -     Lipid Panel; Future  -     TSH+Free T4; Future  -     Vitamin D,25-Hydroxy; Future  -     Vitamin B12 Deficiency Cascade; Future  -     CBC (No Diff); Future  -     Comprehensive Metabolic Panel; Future  -     Folate; Future    12. Need for COVID-19 vaccine  -     COVID-19 Bivalent Booster (Pfizer) 12+yrs    13. Need for influenza vaccination  -     FluLaval/Fluzone >6 mos ([id number])    Increased Cymbalta from 60 mg daily to 90 mg daily. Take blood pressure meds prior to next visit. Keep blood pressure log and bring to next visit. Labs prior to next visit, will discuss results at the appointment.     YSABEL reviewed and appropriate.           Follow Up   Return in about 6 weeks (around 1/24/2023) for Blood Pressure follow up, LABS 2 DAYS PRIOR.  Patient was given instructions and counseling regarding her condition or for health maintenance advice. Please see specific information pulled into the AVS if appropriate.

## 2022-12-14 RX ORDER — CHLORTHALIDONE 25 MG/1
TABLET ORAL
Qty: 30 TABLET | Refills: 1 | Status: SHIPPED | OUTPATIENT
Start: 2022-12-14 | End: 2023-03-31 | Stop reason: SDUPTHER

## 2022-12-14 NOTE — TELEPHONE ENCOUNTER
Rx Refill Note  Requested Prescriptions     Pending Prescriptions Disp Refills   • chlorthalidone (HYGROTON) 25 MG tablet [Pharmacy Med Name: CHLORTHALIDONE 25MG TABLETS] 30 tablet 3     Sig: TAKE 1 TABLET BY MOUTH DAILY FOR SWELLING OR HIGH BLOOD PRESSURE      Last office visit with prescribing clinician: 12/13/2022   Last telemedicine visit with prescribing clinician: 1/31/2023   Next office visit with prescribing clinician: 1/31/2023                         Would you like a call back once the refill request has been completed: [] Yes [] No    If the office needs to give you a call back, can they leave a voicemail: [] Yes [] No    Steph Cartagena MA  12/14/22, 10:58 EST

## 2023-01-30 RX ORDER — LEVOTHYROXINE SODIUM 0.05 MG/1
50 TABLET ORAL
Qty: 90 TABLET | Refills: 3 | Status: SHIPPED | OUTPATIENT
Start: 2023-01-30

## 2023-01-30 NOTE — PROGRESS NOTES
"Chief Complaint  Hypertension (6 week follow up)    Subjective    {Problem List  Visit Diagnosis   Encounters  Notes  Medications  Labs  Result Review Imaging  Media :23}    Heidi Franklin presents to [facility]  History of Present Illness  120s/80s.       Objective   Vital Signs:  There were no vitals taken for this visit.  Estimated body mass index is 33.88 kg/m² as calculated from the following:    Height as of 12/13/22: 165.1 cm (65\").    Weight as of 12/13/22: 92.4 kg (203 lb 9.6 oz).       {BMI is >= 30 and <35. (Class 1 Obesity). The following options were offered after discussion; (Optional):45959}      Physical Exam   Result Review :{Labs  Result Review  Imaging  Med Tab  Media  Procedures  :23}  The following data was reviewed by: Martine Evans MD on 01/31/2023:  Component      Latest Ref Rng & Units 9/8/2022 1/27/2023   Total Cholesterol      0 - 200 mg/dL 172 251 (H)   Triglycerides      0 - 150 mg/dL 208 (H) 196 (H)   HDL Cholesterol      40 - 60 mg/dL 34 (L) 40   VLDL Cholesterol Petros      5 - 40 mg/dL 36 37   LDL Cholesterol      0 - 100 mg/dL 102 (H) 174 (H)     Component      Latest Ref Rng & Units 6/30/2022 1/27/2023   Hemoglobin A1C      4.80 - 5.60 % 5.70 (H) 5.50     Component      Latest Ref Rng & Units 1/27/2023   TSH Baseline      0.270 - 4.200 uIU/mL 0.869   Free T4      0.93 - 1.70 ng/dL 1.08     Component      Latest Ref Rng & Units 1/27/2023   CO2      22.0 - 29.0 mmol/L 31.5 (H)     Component      Latest Ref Rng & Units 6/30/2022 1/27/2023   25 Hydroxy, Vitamin D      30.0 - 100.0 ng/ml 66.5 30.1     Component      Latest Ref Rng & Units 12/29/2021 6/30/2022 9/8/2022 1/27/2023   Folate      4.78 - 24.20 ng/mL 3.55 (L) 5.48 8.87 7.54     {Data reviewed (Optional):17964:::1}             Assessment and Plan {CC Problem List  Visit Diagnosis   ROS  Review (Popup)  Health Maintenance  Quality  BestPractice  Medications  SmartSets  " SnapShot Encounters  Media :23}  Diagnoses and all orders for this visit:    1. Mixed hyperlipidemia (Primary)    2. Essential hypertension    3. Vitamin D deficiency    4. Acquired hypothyroidism  -     levothyroxine (Synthroid) 50 MCG tablet; Take 1 tablet by mouth Every Morning. Take on empty stomach at least 30 min before eating and other meds (for best absorption), for underactive thyroid  Dispense: 90 tablet; Refill: 3    5. Hyperglycemia    6. Folic acid deficiency           {Time Spent (Optional):16766}  Follow Up {Instructions Charge Capture  Follow-up Communications :23}  Return in about 2 months (around 3/31/2023) for Blood Pressure follow up.  Patient was given instructions and counseling regarding her condition or for health maintenance advice. Please see specific information pulled into the AVS if appropriate.

## 2023-01-31 ENCOUNTER — TELEMEDICINE (OUTPATIENT)
Dept: INTERNAL MEDICINE | Facility: CLINIC | Age: 43
End: 2023-01-31
Payer: MEDICARE

## 2023-01-31 DIAGNOSIS — E53.8 FOLIC ACID DEFICIENCY: ICD-10-CM

## 2023-01-31 DIAGNOSIS — E03.9 ACQUIRED HYPOTHYROIDISM: ICD-10-CM

## 2023-01-31 DIAGNOSIS — E55.9 VITAMIN D DEFICIENCY: ICD-10-CM

## 2023-01-31 DIAGNOSIS — R73.9 HYPERGLYCEMIA: ICD-10-CM

## 2023-01-31 DIAGNOSIS — I10 ESSENTIAL HYPERTENSION: Primary | ICD-10-CM

## 2023-01-31 DIAGNOSIS — E78.2 MIXED HYPERLIPIDEMIA: ICD-10-CM

## 2023-01-31 PROCEDURE — 99214 OFFICE O/P EST MOD 30 MIN: CPT | Performed by: FAMILY MEDICINE

## 2023-01-31 NOTE — PROGRESS NOTES
"Chief Complaint  Hypertension (6 week follow up)    Subjective         Heidi Franklin presents to [facility]  History of Present Illness  Blood pressure at home running 120s over 80s  Has large supply of metoprolol   is supposed to follow mediterranean diet--she could try as well    Objective   Vital Signs:   There were no vitals taken for this visit.    Estimated body mass index is 33.88 kg/m² as calculated from the following:    Height as of 12/13/22: 165.1 cm (65\").    Weight as of 12/13/22: 92.4 kg (203 lb 9.6 oz).     Physical Exam   Constitutional: She appears well-developed and well-nourished.   Pulmonary/Chest: Effort normal.   Neurological: She is alert.   Psychiatric: She has a normal mood and affect.     Result Review :   The following data was reviewed by: Martine Evans MD on 01/31/2023:  Component      Latest Ref Rng & Units 9/8/2022 1/27/2023   Total Cholesterol      0 - 200 mg/dL 172 251 (H)   Triglycerides      0 - 150 mg/dL 208 (H) 196 (H)   HDL Cholesterol      40 - 60 mg/dL 34 (L) 40   VLDL Cholesterol Petros      5 - 40 mg/dL 36 37   LDL Cholesterol      0 - 100 mg/dL 102 (H) 174 (H)     Component      Latest Ref Rng & Units 6/30/2022 1/27/2023   Hemoglobin A1C      4.80 - 5.60 % 5.70 (H) 5.50     Component      Latest Ref Rng & Units 1/27/2023   TSH Baseline      0.270 - 4.200 uIU/mL 0.869   Free T4      0.93 - 1.70 ng/dL 1.08     Component      Latest Ref Rng & Units 1/27/2023   CO2      22.0 - 29.0 mmol/L 31.5 (H)     Component      Latest Ref Rng & Units 6/30/2022 1/27/2023   25 Hydroxy, Vitamin D      30.0 - 100.0 ng/ml 66.5 30.1     Component      Latest Ref Rng & Units 12/29/2021 6/30/2022 9/8/2022 1/27/2023   Folate      4.78 - 24.20 ng/mL 3.55 (L) 5.48 8.87 7.54                   Assessment and Plan    Diagnoses and all orders for this visit:    1. Essential hypertension (Primary)  Comments:  continue metoprolol for now but may change to " carvedilol next visit if not <130/80    2. Mixed hyperlipidemia  Comments:  mediterranean diet encouraged. recheck lipids later this year.    3. Vitamin D deficiency  Comments:  encouraged intake of vitd supplement previously prescribed    4. Acquired hypothyroidism  Comments:  tsh therapeutic, continue levothyroxine current dose  Orders:  -     levothyroxine (Synthroid) 50 MCG tablet; Take 1 tablet by mouth Every Morning. Take on empty stomach at least 30 min before eating and other meds (for best absorption), for underactive thyroid  Dispense: 90 tablet; Refill: 3    5. Hyperglycemia  Comments:  a1c improved, was prediabetic range but now back to normal    6. Folic acid deficiency  Comments:  improved over time with supplement        Follow Up   Return in about 2 months (around 3/31/2023) for Blood Pressure follow up.  Patient was given instructions and counseling regarding her condition or for health maintenance advice. Please see specific information pulled into the AVS if appropriate.     Mode of Visit: Video  Location of patient: home  Location of provider: [facility] clinic  You have chosen to receive care through a telehealth visit.  The patient has signed the video visit consent form.  The visit included audio and video interaction. No technical issues occurred during this visit.

## 2023-03-21 DIAGNOSIS — M54.2 CHRONIC NECK PAIN: ICD-10-CM

## 2023-03-21 DIAGNOSIS — G89.29 CHRONIC NECK PAIN: ICD-10-CM

## 2023-03-22 RX ORDER — DIAZEPAM 5 MG/1
TABLET ORAL
Qty: 90 TABLET | Refills: 0 | Status: SHIPPED | OUTPATIENT
Start: 2023-03-22

## 2023-03-22 NOTE — TELEPHONE ENCOUNTER
Rx Refill Note  Requested Prescriptions     Pending Prescriptions Disp Refills   • diazePAM (VALIUM) 5 MG tablet [Pharmacy Med Name: DIAZEPAM 5MG TABLETS] 90 tablet      Sig: TAKE 1 TABLET BY MOUTH THREE TIMES DAILY AS NEEDED FOR MUSCLE SPASMS      Last office visit with prescribing clinician: 12/13/2022   Last telemedicine visit with prescribing clinician  Next office visit with prescribing clinician: 4/11/2023       Nica Bailey MA  03/22/23, 08:19 EDT

## 2023-03-31 DIAGNOSIS — I10 ESSENTIAL HYPERTENSION: ICD-10-CM

## 2023-03-31 RX ORDER — CHLORTHALIDONE 25 MG/1
25 TABLET ORAL DAILY
Qty: 30 TABLET | Refills: 1 | Status: SHIPPED | OUTPATIENT
Start: 2023-03-31

## 2023-05-11 ENCOUNTER — TRANSCRIBE ORDERS (OUTPATIENT)
Dept: ADMINISTRATIVE | Facility: HOSPITAL | Age: 43
End: 2023-05-11
Payer: MEDICARE

## 2023-05-11 DIAGNOSIS — Z12.31 VISIT FOR SCREENING MAMMOGRAM: Primary | ICD-10-CM

## 2023-05-24 DIAGNOSIS — M54.2 CHRONIC NECK PAIN: ICD-10-CM

## 2023-05-24 DIAGNOSIS — G89.29 CHRONIC NECK PAIN: ICD-10-CM

## 2023-05-24 RX ORDER — DIAZEPAM 5 MG/1
5 TABLET ORAL 3 TIMES DAILY PRN
Qty: 90 TABLET | Refills: 0 | OUTPATIENT
Start: 2023-05-24

## 2023-05-24 RX ORDER — DIAZEPAM 5 MG/1
TABLET ORAL
Qty: 90 TABLET | Refills: 0 | Status: SHIPPED | OUTPATIENT
Start: 2023-05-24

## 2023-05-24 NOTE — TELEPHONE ENCOUNTER
Rx Refill Note  Requested Prescriptions     Pending Prescriptions Disp Refills   • diazePAM (VALIUM) 5 MG tablet [Pharmacy Med Name: DIAZEPAM 5MG TABLETS] 90 tablet 0     Sig: TAKE 1 TABLET BY MOUTH THREE TIMES DAILY AS NEEDED FOR MUSCLE SPASMS      Last office visit with prescribing clinician: 12/13/2022   Last telemedicine visit with prescribing clinician: 1/31/2023   Next office visit with prescribing clinician: 5/24/2023     Last UDS- 11/2020

## 2023-08-11 ENCOUNTER — HOSPITAL ENCOUNTER (OUTPATIENT)
Dept: MAMMOGRAPHY | Facility: HOSPITAL | Age: 43
Discharge: HOME | End: 2023-08-11
Admitting: SURGERY
Payer: MEDICARE

## 2023-08-11 DIAGNOSIS — Z12.31 VISIT FOR SCREENING MAMMOGRAM: ICD-10-CM

## 2023-08-11 PROCEDURE — 77067 SCR MAMMO BI INCL CAD: CPT

## 2023-08-11 PROCEDURE — 77063 BREAST TOMOSYNTHESIS BI: CPT

## 2023-08-15 NOTE — PROGRESS NOTES
Patient: Heidi Franklin    YOB: 1980    Date: 08/16/2023    Primary Care Provider: Martine Evans MD    Chief Complaint   Patient presents with    Follow-up     mamogram       SUBJECTIVE:    History of present illness:  Patient is s/p bilateral breast biopsies which were performed in the remote past.  Patient is here for follow-up mammogram which was performed 08/11/2023, it was read BI-RADS category 2 with benign findings.    Bilateral breast ultrasound performed today that showed dense breast tissue with small cysts.    The following portions of the patient's history were reviewed and updated as appropriate: allergies, current medications, past family history, past medical history, past social history, past surgical history and problem list.      Review of Systems   Constitutional:  Negative for chills, fever and unexpected weight change.   HENT:  Negative for hearing loss, trouble swallowing and voice change.    Eyes:  Negative for visual disturbance.   Respiratory:  Negative for apnea, cough, chest tightness, shortness of breath and wheezing.    Cardiovascular:  Negative for chest pain, palpitations and leg swelling.   Gastrointestinal:  Negative for abdominal distention, abdominal pain, anal bleeding, blood in stool, constipation, diarrhea, nausea, rectal pain and vomiting.   Endocrine: Negative for cold intolerance and heat intolerance.   Genitourinary:  Negative for difficulty urinating, dysuria and flank pain.   Musculoskeletal:  Negative for back pain and gait problem.   Skin:  Negative for color change, rash and wound.   Neurological:  Negative for dizziness, syncope, speech difficulty, weakness, light-headedness, numbness and headaches.   Hematological:  Negative for adenopathy. Does not bruise/bleed easily.   Psychiatric/Behavioral:  Negative for confusion. The patient is not nervous/anxious.      Allergies:  Allergies   Allergen Reactions    Citalopram Other (See Comments) and  Unknown - High Severity       Medications:    Current Outpatient Medications:     acetaminophen (TYLENOL) 500 MG tablet, Take 1 tablet by mouth Every 6 (Six) Hours As Needed for Mild Pain., Disp: , Rfl:     albuterol sulfate HFA (Ventolin HFA) 108 (90 Base) MCG/ACT inhaler, Inhale 2 puffs Every 6 (Six) Hours As Needed for Wheezing or Shortness of Air., Disp: 18 g, Rfl: 11    celecoxib (CeleBREX) 200 MG capsule, Take 1 capsule by mouth 2 (Two) Times a Day. For arthritic pain, Disp: 180 capsule, Rfl: 3    chlorthalidone (HYGROTON) 25 MG tablet, Take 1 tablet by mouth Daily. For high blood pressure., Disp: 90 tablet, Rfl: 3    Cholecalciferol 25 MCG (1000 UT) capsule, Take 1 capsule by mouth Daily. Indications: Vitamin D Deficiency, Disp: 90 capsule, Rfl: 3    clotrimazole (LOTRIMIN) 1 % cream, Apply  topically to the appropriate area as directed 2 (Two) Times a Day., Disp: 60 g, Rfl: 3    desvenlafaxine (Pristiq) 50 MG 24 hr tablet, Take 1 tablet by mouth Daily., Disp: 90 tablet, Rfl: 3    diazePAM (VALIUM) 5 MG tablet, Take 1 tablet by mouth 3 (Three) Times a Day As Needed for Muscle Spasms., Disp: 90 tablet, Rfl: 5    Erenumab-aooe (Aimovig) 140 MG/ML prefilled syringe, Inject 1 mL under the skin into the appropriate area as directed Every 30 (Thirty) Days., Disp: 1 mL, Rfl: 11    HYDROcodone-acetaminophen (NORCO) 5-325 MG per tablet, Take 1 tablet by mouth Daily As Needed for Severe Pain., Disp: 20 tablet, Rfl: 0    ketoconazole (NIZORAL) 2 % shampoo, Apply to affected area of damp skin, lather, leave on 5 min and rinse., Disp: 120 mL, Rfl: 1    Levonorgestrel (MIRENA, 52 MG, IU), by Intrauterine route., Disp: , Rfl:     levothyroxine (Synthroid) 50 MCG tablet, Take 1 tablet by mouth Every Morning. Take on empty stomach at least 30 min before eating and other meds (for best absorption), for underactive thyroid, Disp: 90 tablet, Rfl: 3    metoprolol succinate XL (Toprol XL) 50 MG 24 hr tablet, Take 1 tablet by mouth  Daily. Indications: High Blood Pressure Disorder, Disp: 90 tablet, Rfl: 3    vitamin B-6 (PYRIDOXINE) 25 MG tablet, Take 1 tablet by mouth Daily. For vitamin B6 deficiency., Disp: 90 tablet, Rfl: 0    History:  Past Medical History:   Diagnosis Date    Anxiety     Arthritis     Carpal tunnel syndrome     right    Cervical disc herniation 2016    Encounter for insertion of mirena IUD 2016    Encounter for insertion of mirena IUD 2016    Encounter for insertion of mirena IUD 2021    Essential hypertension 2021    Headache     History of Papanicolaou smear of cervix 10/20/2015    WNL    History of Papanicolaou smear of cervix 2016    WNL    Low back pain     Scoliosis        Past Surgical History:   Procedure Laterality Date    BREAST BIOPSY      BREAST CYST ASPIRATION      BREAST SURGERY Right      SECTION      INTERVENTIONAL RADIOLOGY PROCEDURE N/A 2017    Procedure:  myelogram cervical spine;  Surgeon: Moody Fitch MD;  Location: [facility] INVASIVE LOCATION;  Service:     OTHER SURGICAL HISTORY      Breast Mammatome, Needle Biopsy, ACDF     SPINE SURGERY  2017, 2018    ACDF C5-6 and C6-7, ACDF C4-5    SPINE SURGERY  2019    3rd attempt    US GUIDED CYST ASPIRATION BREAST N/A 2020    US GUIDED CYST ASPIRATION BREAST N/A 2020    US GUIDED CYST ASPIRATION BREAST N/A 2021       Family History   Problem Relation Age of Onset    Cancer Mother         Throat Cancer-    COPD Mother              Thyroid disease Mother     Arthritis Maternal Grandmother         Spine    Arthritis Maternal Aunt         Spine    Lupus Maternal Aunt     Arthritis Maternal Aunt         Spine    Drug abuse Maternal Aunt              Uterine cancer Maternal Aunt     Thyroid disease Maternal Aunt     COPD Other     Breast cancer Neg Hx        Social History     Tobacco Use    Smoking status: Every Day     Packs/day:  "1.50     Years: 15.00     Pack years: 22.50     Types: Cigarettes    Smokeless tobacco: Never   Vaping Use    Vaping Use: Never used   Substance Use Topics    Alcohol use: No    Drug use: No        OBJECTIVE:    Vital Signs:   Vitals:    08/16/23 1407   BP: 142/82   Pulse: 94   Resp: 18   Temp: 98.6 øF (37 øC)   TempSrc: Temporal   SpO2: 95%   Weight: 92.5 kg (204 lb)   Height: 165.1 cm (65\")       Physical Exam:   General Appearance:    Alert, cooperative, in no acute distress   Head:    Normocephalic, without obvious abnormality, atraumatic   Eyes:            Lids and lashes normal, conjunctivae and sclerae normal, no   icterus, no pallor, corneas clear, PERRLA   Ears:    Ears appear intact with no abnormalities noted   Throat:   No oral lesions, no thrush, oral mucosa moist   Neck:   No adenopathy, supple, trachea midline, no thyromegaly, no   carotid bruit, no JVD   Lungs:     Clear to auscultation,respirations regular, even and                  unlabored    Heart:    Regular rhythm and normal rate, normal S1 and S2, no            murmur, no gallop, no rub, no click   Chest Wall:    No abnormalities observed   Abdomen:     Normal bowel sounds, no masses, no organomegaly, soft        non-tender, non-distended, no guarding, no rebound                tenderness   Extremities:   Moves all extremities well, no edema, no cyanosis, no             redness   Pulses:   Pulses palpable and equal bilaterally   Skin:   No bleeding, bruising or rash, bilateral breast exam showed no masses   Lymph nodes:   No palpable adenopathy   Neurologic:   Cranial nerves 2 - 12 grossly intact, sensation intact, DTR       present and equal bilaterally     Results Review:   I reviewed the patient's new clinical results.  I reviewed the patient's new imaging results and agree with the interpretation.  I reviewed the patient's other test results and agree with the interpretation    Review of Systems was reviewed and confirmed as accurate as " documented by the MA.    ASSESSMENT/PLAN:    1. Mass of breast, unspecified laterality    2. Other abnormal and inconclusive findings on diagnostic imaging of breast        Stable female doing well and needs no surgical intervention at this time.  She needs to see me back in one year after next mammogram.    I discussed the patients findings and my recommendations with patient        Electronically signed by Rizwan Perera MD  08/22/23

## 2023-08-16 ENCOUNTER — OFFICE VISIT (OUTPATIENT)
Dept: SURGERY | Facility: CLINIC | Age: 43
End: 2023-08-16
Payer: MEDICARE

## 2023-08-16 VITALS
HEART RATE: 94 BPM | HEIGHT: 65 IN | RESPIRATION RATE: 18 BRPM | BODY MASS INDEX: 33.99 KG/M2 | SYSTOLIC BLOOD PRESSURE: 142 MMHG | DIASTOLIC BLOOD PRESSURE: 82 MMHG | OXYGEN SATURATION: 95 % | WEIGHT: 204 LBS | TEMPERATURE: 98.6 F

## 2023-08-16 DIAGNOSIS — N63.0 MASS OF BREAST, UNSPECIFIED LATERALITY: Primary | ICD-10-CM

## 2023-08-16 DIAGNOSIS — R92.8 OTHER ABNORMAL AND INCONCLUSIVE FINDINGS ON DIAGNOSTIC IMAGING OF BREAST: ICD-10-CM

## 2023-08-24 ENCOUNTER — PATIENT MESSAGE (OUTPATIENT)
Dept: INTERNAL MEDICINE | Facility: CLINIC | Age: 43
End: 2023-08-24
Payer: MEDICARE

## 2023-08-24 RX ORDER — RIMEGEPANT SULFATE 75 MG/75MG
75 TABLET, ORALLY DISINTEGRATING ORAL DAILY PRN
Qty: 8 TABLET | Refills: 5 | Status: SHIPPED | OUTPATIENT
Start: 2023-08-24

## 2023-08-24 NOTE — TELEPHONE ENCOUNTER
Pended prescription, may need adjusted. Medication is not in her med list please advise if appropriate

## 2023-08-24 NOTE — TELEPHONE ENCOUNTER
From: Heidi Franklin  To: Martine Evans  Sent: 8/24/2023 7:05 AM EDT  Subject: Nurtec    Can you send a prescription for Nurtec to my pharmacy? I have been fighting migraine for 2 days now. What I have was filled in 2020 and I don't think it is working. Hope you are doing well!! Thank you.  Heidi

## 2023-08-25 ENCOUNTER — PRIOR AUTHORIZATION (OUTPATIENT)
Dept: INTERNAL MEDICINE | Facility: CLINIC | Age: 43
End: 2023-08-25
Payer: MEDICARE

## 2023-10-06 DIAGNOSIS — E53.1 VITAMIN B6 DEFICIENCY: ICD-10-CM

## 2023-10-06 RX ORDER — DIPHENHYDRAMINE HYDROCHLORIDE 25 MG/1
CAPSULE ORAL
Qty: 90 TABLET | Refills: 0 | Status: SHIPPED | OUTPATIENT
Start: 2023-10-06

## 2023-11-27 ENCOUNTER — OFFICE VISIT (OUTPATIENT)
Dept: INTERNAL MEDICINE | Facility: CLINIC | Age: 43
End: 2023-11-27
Payer: MEDICARE

## 2023-11-27 VITALS
HEART RATE: 85 BPM | TEMPERATURE: 97 F | WEIGHT: 199.4 LBS | DIASTOLIC BLOOD PRESSURE: 82 MMHG | BODY MASS INDEX: 33.22 KG/M2 | HEIGHT: 65 IN | RESPIRATION RATE: 16 BRPM | OXYGEN SATURATION: 96 % | SYSTOLIC BLOOD PRESSURE: 134 MMHG

## 2023-11-27 DIAGNOSIS — Z23 NEED FOR INFLUENZA VACCINATION: ICD-10-CM

## 2023-11-27 DIAGNOSIS — I10 ESSENTIAL HYPERTENSION: Primary | ICD-10-CM

## 2023-11-27 DIAGNOSIS — M54.2 CHRONIC NECK PAIN: ICD-10-CM

## 2023-11-27 DIAGNOSIS — G89.29 CHRONIC NECK PAIN: ICD-10-CM

## 2023-11-27 PROCEDURE — 1159F MED LIST DOCD IN RCRD: CPT | Performed by: FAMILY MEDICINE

## 2023-11-27 PROCEDURE — 3079F DIAST BP 80-89 MM HG: CPT | Performed by: FAMILY MEDICINE

## 2023-11-27 PROCEDURE — G0008 ADMIN INFLUENZA VIRUS VAC: HCPCS | Performed by: FAMILY MEDICINE

## 2023-11-27 PROCEDURE — 3075F SYST BP GE 130 - 139MM HG: CPT | Performed by: FAMILY MEDICINE

## 2023-11-27 PROCEDURE — 1160F RVW MEDS BY RX/DR IN RCRD: CPT | Performed by: FAMILY MEDICINE

## 2023-11-27 PROCEDURE — 99213 OFFICE O/P EST LOW 20 MIN: CPT | Performed by: FAMILY MEDICINE

## 2023-11-27 PROCEDURE — 90686 IIV4 VACC NO PRSV 0.5 ML IM: CPT | Performed by: FAMILY MEDICINE

## 2023-11-27 RX ORDER — FLUCONAZOLE 150 MG/1
TABLET ORAL
Qty: 2 TABLET | Refills: 3 | Status: SHIPPED | OUTPATIENT
Start: 2023-11-27

## 2023-11-27 RX ORDER — HYDROCODONE BITARTRATE AND ACETAMINOPHEN 5; 325 MG/1; MG/1
1 TABLET ORAL DAILY PRN
Qty: 20 TABLET | Refills: 0 | Status: SHIPPED | OUTPATIENT
Start: 2023-11-27

## 2023-11-27 NOTE — PROGRESS NOTES
"Chief Complaint  Hypertension (Follow up)    Subjective        Heidi Franklin presents to [facility]  History of Present Illness  Needs refill on pain med which she takes sparingly.  Last refill June 2023.  Still has some pills left.  Missed [facility] follow up going to reschedule  Hypertension  This is a chronic problem. The current episode started more than 1 year ago. The problem has been waxing and waning since onset. Agents associated with hypertension include thyroid hormones and NSAIDs. Risk factors for coronary artery disease include stress and obesity. Current antihypertension treatment includes diuretics and beta blockers. The current treatment provides significant improvement.       Objective   Vital Signs:  /82 (BP Location: Left arm, Patient Position: Sitting, Cuff Size: Adult)   Pulse 85   Temp 97 °F (36.1 °C) (Temporal)   Resp 16   Ht 165.1 cm (65\")   Wt 90.4 kg (199 lb 6.4 oz)   SpO2 96%   BMI 33.18 kg/m²   Estimated body mass index is 33.18 kg/m² as calculated from the following:    Height as of this encounter: 165.1 cm (65\").    Weight as of this encounter: 90.4 kg (199 lb 6.4 oz).         Physical Exam  Vitals and nursing note reviewed.   Constitutional:       General: She is not in acute distress.     Appearance: Normal appearance. She is well-developed and well-groomed. She is obese. She is not ill-appearing, toxic-appearing or diaphoretic.   HENT:      Head: Normocephalic and atraumatic.      Right Ear: Hearing normal.      Left Ear: Hearing normal.   Eyes:      General: Lids are normal. No scleral icterus.     Extraocular Movements: Extraocular movements intact.   Neck:      Trachea: Phonation normal.   Cardiovascular:      Rate and Rhythm: Normal rate and regular rhythm.   Pulmonary:      Effort: Pulmonary effort is normal.   Musculoskeletal:      Cervical back: Neck supple.   Skin:     Coloration: Skin is not jaundiced or pale.   Neurological:      " General: No focal deficit present.      Mental Status: She is alert and oriented to person, place, and time.      Motor: Motor function is intact.   Psychiatric:         Attention and Perception: Attention and perception normal.         Mood and Affect: Mood and affect normal.         Speech: Speech normal.         Behavior: Behavior normal. Behavior is cooperative.         Thought Content: Thought content normal.         Cognition and Memory: Cognition and memory normal.         Judgment: Judgment normal.        Result Review :                   Assessment and Plan   Diagnoses and all orders for this visit:    1. Essential hypertension (Primary)    2. Need for influenza vaccination  -     Fluzone (or Fluarix & Flulaval for VFC) >6 Mos ([id number])    3. Chronic neck pain  -     HYDROcodone-acetaminophen (NORCO) 5-325 MG per tablet; Take 1 tablet by mouth Daily As Needed for Severe Pain.  Dispense: 20 tablet; Refill: 0    Other orders  -     fluconazole (Diflucan) 150 MG tablet; TAKE ONE TAB PO X ONCE, CAN REPEAT IN 4 DAYS IF NEEDED FOR YEAST INFECTION  Dispense: 2 tablet; Refill: 3             Follow Up   Return in about 8 months (around 7/18/2024) for Medicare Wellness.  Patient was given instructions and counseling regarding her condition or for health maintenance advice. Please see specific information pulled into the AVS if appropriate.

## 2024-01-04 DIAGNOSIS — E53.1 VITAMIN B6 DEFICIENCY: ICD-10-CM

## 2024-01-04 RX ORDER — DIPHENHYDRAMINE HYDROCHLORIDE 25 MG/1
CAPSULE ORAL
Qty: 90 TABLET | Refills: 0 | Status: SHIPPED | OUTPATIENT
Start: 2024-01-04

## 2024-03-06 DIAGNOSIS — M54.2 CHRONIC NECK PAIN: ICD-10-CM

## 2024-03-06 DIAGNOSIS — G89.29 CHRONIC NECK PAIN: ICD-10-CM

## 2024-03-06 RX ORDER — HYDROCODONE BITARTRATE AND ACETAMINOPHEN 5; 325 MG/1; MG/1
1 TABLET ORAL DAILY PRN
Qty: 20 TABLET | Refills: 0 | Status: SHIPPED | OUTPATIENT
Start: 2024-03-06

## 2024-03-06 RX ORDER — DIAZEPAM 5 MG/1
5 TABLET ORAL 3 TIMES DAILY PRN
Qty: 90 TABLET | Refills: 5 | Status: SHIPPED | OUTPATIENT
Start: 2024-03-06

## 2024-03-06 NOTE — TELEPHONE ENCOUNTER
Rx Refill Note  Requested Prescriptions     Pending Prescriptions Disp Refills    diazePAM (VALIUM) 5 MG tablet 90 tablet 5     Sig: Take 1 tablet by mouth 3 (Three) Times a Day As Needed for Muscle Spasms.    HYDROcodone-acetaminophen (NORCO) 5-325 MG per tablet 20 tablet 0     Sig: Take 1 tablet by mouth Daily As Needed for Severe Pain.      Last office visit with prescribing clinician: 11/27/2023   Next office visit with prescribing clinician: 7/29/2024       Seema Bahena MA  03/06/24, 16:21 EST    UDS: 6/23/2023

## 2024-04-03 DIAGNOSIS — E53.1 VITAMIN B6 DEFICIENCY: ICD-10-CM

## 2024-04-03 RX ORDER — DIPHENHYDRAMINE HYDROCHLORIDE 25 MG/1
25 CAPSULE ORAL DAILY
Qty: 90 TABLET | Refills: 0 | Status: SHIPPED | OUTPATIENT
Start: 2024-04-03

## 2024-04-22 ENCOUNTER — TRANSCRIBE ORDERS (OUTPATIENT)
Dept: ADMINISTRATIVE | Facility: HOSPITAL | Age: 44
End: 2024-04-22
Payer: MEDICARE

## 2024-04-22 DIAGNOSIS — Z12.31 SCREENING MAMMOGRAM FOR BREAST CANCER: Primary | ICD-10-CM

## 2024-05-31 DIAGNOSIS — E03.9 ACQUIRED HYPOTHYROIDISM: ICD-10-CM

## 2024-05-31 RX ORDER — LEVOTHYROXINE SODIUM 0.05 MG/1
50 TABLET ORAL
Qty: 90 TABLET | Refills: 3 | OUTPATIENT
Start: 2024-05-31

## 2024-05-31 RX ORDER — LEVOTHYROXINE SODIUM 0.05 MG/1
TABLET ORAL
Qty: 90 TABLET | Refills: 3 | Status: SHIPPED | OUTPATIENT
Start: 2024-05-31

## 2024-05-31 NOTE — TELEPHONE ENCOUNTER
Rx Refill Note  Requested Prescriptions     Pending Prescriptions Disp Refills    levothyroxine (SYNTHROID, LEVOTHROID) 50 MCG tablet [Pharmacy Med Name: LEVOTHYROXINE 0.05MG (50MCG) TAB] 90 tablet 3     Sig: TAKE 1 TABLET BY MOUTH EVERY DAY(TAKE ON AN EMPTY STOMACH 30 MINUTES BEFORE EATING AND OTHER MEDS)      Last office visit with prescribing clinician: 11/27/2023   Next office visit with prescribing clinician: 5/31/2024       Seema Bahena MA  05/31/24, 14:46 EDT

## 2024-06-13 ENCOUNTER — TRANSCRIBE ORDERS (OUTPATIENT)
Dept: ADMINISTRATIVE | Facility: HOSPITAL | Age: 44
End: 2024-06-13
Payer: MEDICARE

## 2024-06-13 DIAGNOSIS — M51.36 DDD (DEGENERATIVE DISC DISEASE), LUMBAR: ICD-10-CM

## 2024-06-13 DIAGNOSIS — M47.816 LUMBAR SPONDYLOSIS: Primary | ICD-10-CM

## 2024-07-29 ENCOUNTER — HOSPITAL ENCOUNTER (OUTPATIENT)
Dept: MRI IMAGING | Facility: HOSPITAL | Age: 44
Discharge: HOME | End: 2024-07-29
Admitting: PHYSICIAN ASSISTANT
Payer: MEDICARE

## 2024-07-29 ENCOUNTER — OFFICE VISIT (OUTPATIENT)
Dept: INTERNAL MEDICINE | Facility: CLINIC | Age: 44
End: 2024-07-29
Payer: MEDICARE

## 2024-07-29 VITALS
TEMPERATURE: 97.3 F | BODY MASS INDEX: 32.89 KG/M2 | OXYGEN SATURATION: 96 % | HEART RATE: 84 BPM | DIASTOLIC BLOOD PRESSURE: 80 MMHG | RESPIRATION RATE: 16 BRPM | WEIGHT: 197.4 LBS | SYSTOLIC BLOOD PRESSURE: 136 MMHG | HEIGHT: 65 IN

## 2024-07-29 DIAGNOSIS — E66.09 CLASS 1 OBESITY DUE TO EXCESS CALORIES WITH SERIOUS COMORBIDITY AND BODY MASS INDEX (BMI) OF 32.0 TO 32.9 IN ADULT: ICD-10-CM

## 2024-07-29 DIAGNOSIS — E53.8 FOLIC ACID DEFICIENCY: ICD-10-CM

## 2024-07-29 DIAGNOSIS — M54.2 CHRONIC NECK PAIN: ICD-10-CM

## 2024-07-29 DIAGNOSIS — M47.816 LUMBAR SPONDYLOSIS: ICD-10-CM

## 2024-07-29 DIAGNOSIS — R06.2 WHEEZING: ICD-10-CM

## 2024-07-29 DIAGNOSIS — R79.9 ABNORMAL BLOOD CHEMISTRY: ICD-10-CM

## 2024-07-29 DIAGNOSIS — F33.1 MODERATE EPISODE OF RECURRENT MAJOR DEPRESSIVE DISORDER: ICD-10-CM

## 2024-07-29 DIAGNOSIS — Z12.11 COLON CANCER SCREENING: ICD-10-CM

## 2024-07-29 DIAGNOSIS — G43.909 MIGRAINE WITHOUT STATUS MIGRAINOSUS, NOT INTRACTABLE, UNSPECIFIED MIGRAINE TYPE: ICD-10-CM

## 2024-07-29 DIAGNOSIS — E53.1 VITAMIN B6 DEFICIENCY: ICD-10-CM

## 2024-07-29 DIAGNOSIS — F41.9 ANXIETY: ICD-10-CM

## 2024-07-29 DIAGNOSIS — Z51.81 MEDICATION MONITORING ENCOUNTER: ICD-10-CM

## 2024-07-29 DIAGNOSIS — Z72.0 TOBACCO USE: ICD-10-CM

## 2024-07-29 DIAGNOSIS — Z00.00 MEDICARE ANNUAL WELLNESS VISIT, SUBSEQUENT: Primary | ICD-10-CM

## 2024-07-29 DIAGNOSIS — R73.9 HYPERGLYCEMIA: ICD-10-CM

## 2024-07-29 DIAGNOSIS — D80.8 IGE DEFICIENCY: ICD-10-CM

## 2024-07-29 DIAGNOSIS — E55.9 VITAMIN D DEFICIENCY: ICD-10-CM

## 2024-07-29 DIAGNOSIS — G89.29 CHRONIC NECK PAIN: ICD-10-CM

## 2024-07-29 DIAGNOSIS — Z13.220 LIPID SCREENING: ICD-10-CM

## 2024-07-29 DIAGNOSIS — M51.36 DDD (DEGENERATIVE DISC DISEASE), LUMBAR: ICD-10-CM

## 2024-07-29 DIAGNOSIS — I10 ESSENTIAL HYPERTENSION: ICD-10-CM

## 2024-07-29 DIAGNOSIS — E03.9 ACQUIRED HYPOTHYROIDISM: ICD-10-CM

## 2024-07-29 DIAGNOSIS — M54.12 CERVICAL RADICULOPATHY: ICD-10-CM

## 2024-07-29 PROCEDURE — 1159F MED LIST DOCD IN RCRD: CPT | Performed by: FAMILY MEDICINE

## 2024-07-29 PROCEDURE — 96160 PT-FOCUSED HLTH RISK ASSMT: CPT | Performed by: FAMILY MEDICINE

## 2024-07-29 PROCEDURE — 1170F FXNL STATUS ASSESSED: CPT | Performed by: FAMILY MEDICINE

## 2024-07-29 PROCEDURE — 72148 MRI LUMBAR SPINE W/O DYE: CPT

## 2024-07-29 PROCEDURE — G0439 PPPS, SUBSEQ VISIT: HCPCS | Performed by: FAMILY MEDICINE

## 2024-07-29 PROCEDURE — 1126F AMNT PAIN NOTED NONE PRSNT: CPT | Performed by: FAMILY MEDICINE

## 2024-07-29 PROCEDURE — 1160F RVW MEDS BY RX/DR IN RCRD: CPT | Performed by: FAMILY MEDICINE

## 2024-07-29 PROCEDURE — 3079F DIAST BP 80-89 MM HG: CPT | Performed by: FAMILY MEDICINE

## 2024-07-29 PROCEDURE — 99396 PREV VISIT EST AGE 40-64: CPT | Performed by: FAMILY MEDICINE

## 2024-07-29 PROCEDURE — 3075F SYST BP GE 130 - 139MM HG: CPT | Performed by: FAMILY MEDICINE

## 2024-07-29 PROCEDURE — 99214 OFFICE O/P EST MOD 30 MIN: CPT | Performed by: FAMILY MEDICINE

## 2024-07-29 RX ORDER — CELECOXIB 200 MG/1
200 CAPSULE ORAL 2 TIMES DAILY
Qty: 180 CAPSULE | Refills: 3 | Status: SHIPPED | OUTPATIENT
Start: 2024-07-29

## 2024-07-29 RX ORDER — METOPROLOL SUCCINATE 50 MG/1
50 TABLET, EXTENDED RELEASE ORAL DAILY
Qty: 90 TABLET | Refills: 3 | Status: SHIPPED | OUTPATIENT
Start: 2024-07-29

## 2024-07-29 RX ORDER — DESVENLAFAXINE SUCCINATE 50 MG/1
50 TABLET, EXTENDED RELEASE ORAL DAILY
Qty: 90 TABLET | Refills: 3 | Status: SHIPPED | OUTPATIENT
Start: 2024-07-29

## 2024-07-29 RX ORDER — HYDROCODONE BITARTRATE AND ACETAMINOPHEN 5; 325 MG/1; MG/1
5 TABLET ORAL
COMMUNITY
Start: 2024-06-12

## 2024-07-29 RX ORDER — ALBUTEROL SULFATE 90 UG/1
2 AEROSOL, METERED RESPIRATORY (INHALATION) EVERY 6 HOURS PRN
Qty: 18 G | Refills: 11 | Status: SHIPPED | OUTPATIENT
Start: 2024-07-29

## 2024-07-29 RX ORDER — CHLORTHALIDONE 25 MG/1
25 TABLET ORAL DAILY
Qty: 90 TABLET | Refills: 3 | Status: SHIPPED | OUTPATIENT
Start: 2024-07-29

## 2024-07-29 RX ORDER — NARATRIPTAN 2.5 MG/1
2.5 TABLET ORAL DAILY PRN
Qty: 9 TABLET | Refills: 11 | Status: SHIPPED | OUTPATIENT
Start: 2024-07-29

## 2024-07-29 RX ORDER — RIMEGEPANT SULFATE 75 MG/75MG
75 TABLET, ORALLY DISINTEGRATING ORAL DAILY PRN
Qty: 8 TABLET | Refills: 11 | Status: SHIPPED | OUTPATIENT
Start: 2024-07-29

## 2024-07-29 NOTE — PROGRESS NOTES
Subjective   The ABCs of the Annual Wellness Visit  Medicare Wellness Visit      Heidi Franklin is a 43 y.o. patient who presents for a Medicare Wellness Visit.    The following portions of the patient's history were reviewed and   updated as appropriate: allergies, current medications, past family history, past medical history, past social history, past surgical history, and problem list.    Compared to one year ago, the patient's physical   health is worse.  Compared to one year ago, the patient's mental   health is worse.    Recent Hospitalizations:  She was not admitted to the hospital during the last year.     Current Medical Providers:  Patient Care Team:  Martine Evans MD as PCP - General (Family Medicine)  Rizwan Perera MD as Consulting Physician (General Surgery)  Stephen Romero MD as Surgeon (Neurosurgery)  John May MD as Obstetrician (Obstetrics and Gynecology)  Luis Armando Addison PA-C as Physician Assistant (Physician Assistant)  Jose Laura MD as Consulting Physician (Hand Surgery)    Outpatient Medications Prior to Visit   Medication Sig Dispense Refill    acetaminophen (TYLENOL) 500 MG tablet Take 1 tablet by mouth Every 6 (Six) Hours As Needed for Mild Pain.      Cholecalciferol 25 MCG (1000 UT) capsule Take 1 capsule by mouth Daily. Indications: Vitamin D Deficiency 90 capsule 3    clotrimazole (LOTRIMIN) 1 % cream Apply  topically to the appropriate area as directed 2 (Two) Times a Day. 60 g 3    diazePAM (VALIUM) 5 MG tablet Take 1 tablet by mouth 3 (Three) Times a Day As Needed for Muscle Spasms. 90 tablet 5    HYDROcodone-acetaminophen (NORCO) 5-325 MG per tablet Take 5 mg of hydrocodone by mouth.      Levonorgestrel (MIRENA, 52 MG, IU) by Intrauterine route.      levothyroxine (SYNTHROID, LEVOTHROID) 50 MCG tablet TAKE 1 TABLET BY MOUTH EVERY DAY(TAKE ON AN EMPTY STOMACH 30 MINUTES BEFORE EATING AND OTHER MEDS) 90 tablet 3    albuterol sulfate HFA  (Ventolin HFA) 108 (90 Base) MCG/ACT inhaler Inhale 2 puffs Every 6 (Six) Hours As Needed for Wheezing or Shortness of Air. 18 g 11    celecoxib (CeleBREX) 200 MG capsule Take 1 capsule by mouth 2 (Two) Times a Day. For arthritic pain 180 capsule 3    chlorthalidone (HYGROTON) 25 MG tablet Take 1 tablet by mouth Daily. For high blood pressure. 90 tablet 3    desvenlafaxine (Pristiq) 50 MG 24 hr tablet Take 1 tablet by mouth Daily. 90 tablet 3    Erenumab-aooe (Aimovig) 140 MG/ML prefilled syringe Inject 1 mL under the skin into the appropriate area as directed Every 30 (Thirty) Days. 1 mL 11    fluconazole (Diflucan) 150 MG tablet TAKE ONE TAB PO X ONCE, CAN REPEAT IN 4 DAYS IF NEEDED FOR YEAST INFECTION 2 tablet 3    ketoconazole (NIZORAL) 2 % shampoo Apply to affected area of damp skin, lather, leave on 5 min and rinse. 120 mL 1    metoprolol succinate XL (Toprol XL) 50 MG 24 hr tablet Take 1 tablet by mouth Daily. Indications: High Blood Pressure Disorder 90 tablet 3    naratriptan (AMERGE) 2.5 MG tablet Take 1 tablet by mouth Daily As Needed for Migraine. 2.5 mg at onset of headache, may repeat in 4 hours if needed 9 tablet 0    Rimegepant Sulfate (Nurtec) 75 MG tablet dispersible tablet Take 1 tablet by mouth Daily As Needed (migraine). 8 tablet 5    vitamin B-6 (PYRIDOXINE) 25 MG tablet TAKE 1 TABLET BY MOUTH EVERY DAY 90 tablet 0    HYDROcodone-acetaminophen (NORCO) 5-325 MG per tablet Take 1 tablet by mouth Daily As Needed for Severe Pain. 20 tablet 0     No facility-administered medications prior to visit.     Opioid medication/s are on active medication list.  and I have evaluated her active treatment plan and pain score trends (see table).  Vitals:    07/29/24 1544   PainSc: 0-No pain     I have reviewed the chart for potential of high risk medication and harmful drug interactions in the elderly.        Aspirin is not on active medication list.  Aspirin use is not indicated based on review of current  "medical condition/s. Risk of harm outweighs potential benefits.  .    Patient Active Problem List   Diagnosis    Anxiety    Headache, migraine    Affective disorder    Goiter, nontoxic, multinodular    Cervical disc herniation    Chronic neck pain    Cervical radiculopathy    Spinal stenosis in cervical region    Essential hypertension    Chronic pain of both knees    Fibrocystic breast changes    IgE deficiency     Advance Care Planning (Click this link to access ACP Navigator)  Advance Directive is not on file.  ACP discussion was held with the patient during this visit. Patient does not have an advance directive, declines further assistance.        Objective   Vitals:    07/29/24 1544   BP: 136/80   BP Location: Left arm   Patient Position: Sitting   Cuff Size: Adult   Pulse: 84   Resp: 16   Temp: 97.3 °F (36.3 °C)   TempSrc: Temporal   SpO2: 96%   Weight: 89.5 kg (197 lb 6.4 oz)   Height: 165.1 cm (65\")   PainSc: 0-No pain       Estimated body mass index is 32.85 kg/m² as calculated from the following:    Height as of this encounter: 165.1 cm (65\").    Weight as of this encounter: 89.5 kg (197 lb 6.4 oz).    BMI is >= 30 and <35. (Class 1 Obesity). The following options were offered after discussion;: weight loss educational material (shared in after visit summary)        Does the patient have evidence of cognitive impairment? No                                                                                                Health  Risk Assessment    Smoking Status:  Social History     Tobacco Use   Smoking Status Every Day    Current packs/day: 1.50    Average packs/day: 1.5 packs/day for 15.0 years (22.5 ttl pk-yrs)    Types: Cigarettes   Smokeless Tobacco Never     Alcohol Consumption:  Social History     Substance and Sexual Activity   Alcohol Use No     Fall Risk Screen:  STEADI Fall Risk Assessment was completed, and patient is at LOW risk for falls.Assessment completed on:7/29/2024    Depression " Screenin/29/2024     3:42 PM   PHQ-2/PHQ-9 Depression Screening   Little Interest or Pleasure in Doing Things 0-->not at all   Feeling Down, Depressed or Hopeless 0-->not at all   PHQ-9: Brief Depression Severity Measure Score 0     Health Habits and Functional and Cognitive Screenin/29/2024     3:38 PM   Functional & Cognitive Status   Do you have difficulty preparing food and eating? No   Do you have difficulty bathing yourself, getting dressed or grooming yourself? No   Do you have difficulty using the toilet? No   Do you have difficulty moving around from place to place? No   Do you have trouble with steps or getting out of a bed or a chair? Yes   Current Diet Well Balanced Diet   Dental Exam Up to date   Eye Exam Up to date   Exercise (times per week) 3 times per week   Current Exercises Include Walking   Do you need help using the phone?  No   Are you deaf or do you have serious difficulty hearing?  No   Do you need help to go to places out of walking distance? No   Do you need help shopping? No   Do you need help preparing meals?  No   Do you need help with housework?  No   Do you need help with laundry? No   Do you need help taking your medications? No   Do you need help managing money? No   Do you ever drive or ride in a car without wearing a seat belt? No   Have you felt unusual stress, anger or loneliness in the last month? No   Who do you live with? Spouse   If you need help, do you have trouble finding someone available to you? No   Have you been bothered in the last four weeks by sexual problems? No   Do you have difficulty concentrating, remembering or making decisions? No             Age-appropriate Screening Schedule:  Refer to the list below for future screening recommendations based on patient's age, sex and/or medical conditions. Orders for these recommended tests are listed in the plan section. The patient has been provided with a written plan.    Health Maintenance  "List  Health Maintenance   Topic Date Due    COLORECTAL CANCER SCREENING  Never done    LIPID PANEL  01/27/2024    INFLUENZA VACCINE  08/01/2024    PAP SMEAR  07/06/2025    ANNUAL WELLNESS VISIT  07/29/2025    BMI FOLLOWUP  07/29/2025    MAMMOGRAM  08/11/2025    TDAP/TD VACCINES (3 - Td or Tdap) 12/20/2028    HEPATITIS C SCREENING  Completed    COVID-19 Vaccine  Completed    Pneumococcal Vaccine 0-64  Completed                                                                                                                                                CMS Preventative Services Quick Reference  Risk Factors Identified During Encounter  Chronic Pain:  follow up with  neurosurgery  Polypharmacy: Medication List reviewed and Medications are appropriate for patient    The above risks/problems have been discussed with the patient.  Pertinent information has been shared with the patient in the After Visit Summary.  An After Visit Summary and PPPS were made available to the patient.    Follow Up:   Next Medicare Wellness visit to be scheduled in 1 year.         Additional E&M Note during same encounter follows:  Patient has additional, significant, and separately identifiable condition(s)/problem(s) that require work above and beyond the Medicare Wellness Visit     Chief Complaint  Medicare Wellness-subsequent (AWV and preventive care ) and Hypertension    Subjective   HPI  Heidi is also being seen today for an annual adult preventative physical exam.  and Heidi is also being seen today for additional medical problem/s.                Objective   Vital Signs:  /80 (BP Location: Left arm, Patient Position: Sitting, Cuff Size: Adult)   Pulse 84   Temp 97.3 °F (36.3 °C) (Temporal)   Resp 16   Ht 165.1 cm (65\")   Wt 89.5 kg (197 lb 6.4 oz)   SpO2 96%   BMI 32.85 kg/m²   Physical Exam  Vitals and nursing note reviewed.   Constitutional:       General: She is not in acute distress.     Appearance: Normal " appearance. She is well-developed and well-groomed. She is obese. She is not ill-appearing, toxic-appearing or diaphoretic.   HENT:      Head: Normocephalic and atraumatic.      Right Ear: Hearing and external ear normal.      Left Ear: Hearing and external ear normal.      Nose: Nose normal.      Mouth/Throat:      Lips: Pink. No lesions.      Mouth: Mucous membranes are moist.   Eyes:      General: Lids are normal. No scleral icterus.     Extraocular Movements: Extraocular movements intact.   Neck:      Trachea: Phonation normal.   Cardiovascular:      Rate and Rhythm: Normal rate and regular rhythm.   Pulmonary:      Effort: Pulmonary effort is normal.      Breath sounds: Normal breath sounds.   Musculoskeletal:      Cervical back: Neck supple.   Skin:     Coloration: Skin is not jaundiced or pale.   Neurological:      General: No focal deficit present.      Mental Status: She is alert and oriented to person, place, and time.      Motor: Motor function is intact.   Psychiatric:         Attention and Perception: Attention and perception normal.         Mood and Affect: Mood and affect normal.         Speech: Speech normal.         Behavior: Behavior normal. Behavior is cooperative.         Thought Content: Thought content normal.         Cognition and Memory: Cognition and memory normal.         Judgment: Judgment normal.         The following data was reviewed by: Martine Evans MD on 07/29/2024:   neurosurgery note 6/12/24  Last UDS 6/23/23    Mammogram scheduled 8/12/24        Assessment and Plan       Diagnoses and all orders for this visit:    1. Medicare annual wellness visit, subsequent (Primary)    2. IgE deficiency  Comments:  chronic condition, may be more prone to respiratory infections, etc. monitor.  Overview:  hx of recurrent infections      3. Tobacco use  Comments:  cessation ideal, needs to quit prior to any surgical procedures that may be needed for back    4. Essential  hypertension  Comments:  borderline controlled, continue current medicine  Orders:  -     CBC (No Diff)  -     Comprehensive Metabolic Panel  -     TSH Rfx On Abnormal To Free T4  -     Methylmalonic Acid, Serum  -     Homocysteine  -     chlorthalidone (HYGROTON) 25 MG tablet; Take 1 tablet by mouth Daily. For high blood pressure.  Dispense: 90 tablet; Refill: 3  -     metoprolol succinate XL (Toprol XL) 50 MG 24 hr tablet; Take 1 tablet by mouth Daily. Indications: High Blood Pressure Disorder  Dispense: 90 tablet; Refill: 3    5. Moderate episode of recurrent major depressive disorder  -     desvenlafaxine (Pristiq) 50 MG 24 hr tablet; Take 1 tablet by mouth Daily.  Dispense: 90 tablet; Refill: 3    6. Anxiety  -     Urine Drug Screen - Urine, Clean Catch  -     desvenlafaxine (Pristiq) 50 MG 24 hr tablet; Take 1 tablet by mouth Daily.  Dispense: 90 tablet; Refill: 3    7. Cervical radiculopathy  -     Urine Drug Screen - Urine, Clean Catch  -     celecoxib (CeleBREX) 200 MG capsule; Take 1 capsule by mouth 2 (Two) Times a Day. For arthritic pain  Dispense: 180 capsule; Refill: 3    8. Chronic neck pain  -     Urine Drug Screen - Urine, Clean Catch  -     celecoxib (CeleBREX) 200 MG capsule; Take 1 capsule by mouth 2 (Two) Times a Day. For arthritic pain  Dispense: 180 capsule; Refill: 3  -     desvenlafaxine (Pristiq) 50 MG 24 hr tablet; Take 1 tablet by mouth Daily.  Dispense: 90 tablet; Refill: 3    9. Vitamin B6 deficiency  -     Vitamin B6    10. Folic acid deficiency  -     Vitamin B12  -     Folate  -     Methylmalonic Acid, Serum  -     Homocysteine    11. Acquired hypothyroidism  -     CBC (No Diff)  -     TSH Rfx On Abnormal To Free T4    12. Lipid screening  -     Lipid Panel    13. Vitamin D deficiency  -     Vitamin D,25-Hydroxy    14. Hyperglycemia  -     Hemoglobin A1c  -     Comprehensive Metabolic Panel    15. Class 1 obesity due to excess calories with serious comorbidity and body mass index  (BMI) of 32.0 to 32.9 in adult  -     Vitamin D,25-Hydroxy    16. Wheezing  -     albuterol sulfate HFA (Ventolin HFA) 108 (90 Base) MCG/ACT inhaler; Inhale 2 puffs Every 6 (Six) Hours As Needed for Wheezing or Shortness of Air.  Dispense: 18 g; Refill: 11    17. Migraine without status migrainosus, not intractable, unspecified migraine type  -     naratriptan (AMERGE) 2.5 MG tablet; Take 1 tablet by mouth Daily As Needed for Migraine. 2.5 mg at onset of headache, may repeat in 4 hours if needed  Dispense: 9 tablet; Refill: 11  -     Rimegepant Sulfate (Nurtec) 75 MG tablet dispersible tablet; Take 1 tablet by mouth Daily As Needed (migraine).  Dispense: 8 tablet; Refill: 11    18. Abnormal blood chemistry  -     Hemoglobin A1c  -     CBC (No Diff)  -     Comprehensive Metabolic Panel  -     Lipid Panel  -     TSH Rfx On Abnormal To Free T4  -     Vitamin B6  -     Vitamin D,25-Hydroxy  -     Vitamin B12  -     Folate  -     Methylmalonic Acid, Serum  -     Homocysteine    19. Medication monitoring encounter  -     Urine Drug Screen - Urine, Clean Catch  -     Hemoglobin A1c  -     CBC (No Diff)  -     Comprehensive Metabolic Panel  -     Lipid Panel  -     TSH Rfx On Abnormal To Free T4  -     Vitamin B6  -     Vitamin D,25-Hydroxy  -     Vitamin B12  -     Folate  -     Methylmalonic Acid, Serum  -     Homocysteine    20. Colon cancer screening  -     Cologuard - Stool, Per Rectum; Future          Orders Placed This Encounter   Procedures    Urine Drug Screen - Urine, Clean Catch     Order Specific Question:   Release to patient     Answer:   Routine Release [[id number]]    Cologuard - Stool, Per Rectum     Standing Status:   Future     Standing Expiration Date:   7/29/2025     Order Specific Question:   Release to patient     Answer:   Routine Release [[id number]]    Hemoglobin A1c     Order Specific Question:   Release to patient     Answer:   Routine Release [[id number]]    CBC (No Diff)     Order Specific  Question:   Release to patient     Answer:   Routine Release [[id number]]    Comprehensive Metabolic Panel     Order Specific Question:   Release to patient     Answer:   Routine Release [[id number]]    Lipid Panel     Order Specific Question:   [facility] Has the patient fasted?     Answer:   Yes     Order Specific Question:   Release to patient     Answer:   Routine Release [[id number]]    TSH Rfx On Abnormal To Free T4     Order Specific Question:   Release to patient     Answer:   Routine Release [[id number]]    Vitamin B6     Order Specific Question:   Release to patient     Answer:   Routine Release [[id number]]    Vitamin D,25-Hydroxy     Order Specific Question:   Release to patient     Answer:   Routine Release [[id number]]    Vitamin B12     Order Specific Question:   Release to patient     Answer:   Routine Release [[id number]]    Folate     Order Specific Question:   Release to patient     Answer:   Routine Release [[id number]]    Methylmalonic Acid, Serum     Order Specific Question:   Release to patient     Answer:   Routine Release [[id number]]    Homocysteine     Order Specific Question:   Release to patient     Answer:   Routine Release [[id number]]     New Medications Ordered This Visit   Medications    chlorthalidone (HYGROTON) 25 MG tablet     Sig: Take 1 tablet by mouth Daily. For high blood pressure.     Dispense:  90 tablet     Refill:  3    celecoxib (CeleBREX) 200 MG capsule     Sig: Take 1 capsule by mouth 2 (Two) Times a Day. For arthritic pain     Dispense:  180 capsule     Refill:  3    desvenlafaxine (Pristiq) 50 MG 24 hr tablet     Sig: Take 1 tablet by mouth Daily.     Dispense:  90 tablet     Refill:  3     Replaced cymbalta    albuterol sulfate HFA (Ventolin HFA) 108 (90 Base) MCG/ACT inhaler     Sig: Inhale 2 puffs Every 6 (Six) Hours As Needed for Wheezing or Shortness of Air.     Dispense:  18 g     Refill:  11    metoprolol succinate XL (Toprol XL) 50 MG 24 hr tablet     Sig: Take  1 tablet by mouth Daily. Indications: High Blood Pressure Disorder     Dispense:  90 tablet     Refill:  3    naratriptan (AMERGE) 2.5 MG tablet     Sig: Take 1 tablet by mouth Daily As Needed for Migraine. 2.5 mg at onset of headache, may repeat in 4 hours if needed     Dispense:  9 tablet     Refill:  11    Rimegepant Sulfate (Nurtec) 75 MG tablet dispersible tablet     Sig: Take 1 tablet by mouth Daily As Needed (migraine).     Dispense:  8 tablet     Refill:  11        I spent 27 minutes caring for Heidi on this date of service. This time includes time spent by me in the following activities:preparing for the visit, reviewing tests, performing a medically appropriate examination and/or evaluation , counseling and educating the patient/family/caregiver, ordering medications, tests, or procedures, and documenting information in the medical record    I spent 18 minutes on the separately reported service of [id number]. This time is not included in the time used to support the E/M service also reported today.     Follow Up   Return in about 6 months (around 1/29/2025) for Blood Pressure follow up.  Patient was given instructions and counseling regarding her condition or for health maintenance advice. Please see specific information pulled into the AVS if appropriate.

## 2024-07-29 NOTE — PATIENT INSTRUCTIONS
Medicare Wellness  Personal Prevention Plan of Service     Date of Office Visit:    Encounter Provider:  Martine Evans MD  Place of Service:  [facility]  Patient Name: Heidi Franklin  :  1980    As part of the Medicare Wellness portion of your visit today, we are providing you with this personalized preventive plan of services (PPPS). This plan is based upon recommendations of the [place] Preventive Services Task Force (USPSTF) and the Advisory Committee on Immunization Practices (ACIP).    This lists the preventive care services that should be considered, and provides dates of when you are due. Items listed as completed are up-to-date and do not require any further intervention.    Health Maintenance   Topic Date Due    COLORECTAL CANCER SCREENING  Never done    LIPID PANEL  2024    ANNUAL WELLNESS VISIT  2024    INFLUENZA VACCINE  2024    PAP SMEAR  2025    BMI FOLLOWUP  2025    MAMMOGRAM  2025    TDAP/TD VACCINES (3 - Td or Tdap) 2028    HEPATITIS C SCREENING  Completed    COVID-19 Vaccine  Completed    Pneumococcal Vaccine 0-64  Completed       Orders Placed This Encounter   Procedures    Urine Drug Screen - Urine, Clean Catch     Order Specific Question:   Release to patient     Answer:   Routine Release [[id number]]    Cologuard - Stool, Per Rectum     Standing Status:   Future     Standing Expiration Date:   2025     Order Specific Question:   Release to patient     Answer:   Routine Release [[id number]]         Return in about 6 months (around 2025) for Blood Pressure follow up.        Health Maintenance, Female    Adopting a healthy lifestyle and getting preventive care are important in promoting health and wellness. Ask your health care provider about:  The right schedule for you to have regular tests and exams.  Things you can do on your own to prevent diseases and keep yourself healthy.    What should  I know about diet, weight, and exercise?  Eat a healthy diet    Eat a diet that includes plenty of vegetables, fruits, low-fat dairy products, and lean protein.  Do not eat a lot of foods that are high in solid fats, added sugars, or sodium.    Maintain a healthy weight  Body mass index (BMI) is used to identify weight problems. It estimates body fat based on height and weight. Your health care provider can help determine your BMI and help you achieve or maintain a healthy weight.  Get regular exercise  Get regular exercise. This is one of the most important things you can do for your health. Most adults should:  Exercise for at least 150 minutes each week. The exercise should increase your heart rate and make you sweat (moderate-intensity exercise).  Do strengthening exercises at least twice a week. This is in addition to the moderate-intensity exercise.  Spend less time sitting. Even light physical activity can be beneficial.  Watch cholesterol and blood lipids  Have your blood tested for lipids and cholesterol at 20 years of age, then have this test every 5 years.  Have your cholesterol levels checked more often if:  Your lipid or cholesterol levels are high.  You are older than 40 years of age.  You are at high risk for heart disease.    What should I know about cancer screening?  Depending on your health history and family history, you may need to have cancer screening at various ages. This may include screening for:  Breast cancer.  Cervical cancer.  Colorectal cancer.  Skin cancer.  Lung cancer.    What should I know about heart disease, diabetes, and high blood pressure?  Blood pressure and heart disease  High blood pressure causes heart disease and increases the risk of stroke. This is more likely to develop in people who have high blood pressure readings or are overweight.  Have your blood pressure checked:  Every 3-5 years if you are 18-39 years of age.  Every year if you are 40 years old or  older.  Diabetes  Have regular diabetes screenings. This checks your fasting blood sugar level. Have the screening done:  Once every three years after age 40 if you are at a normal weight and have a low risk for diabetes.  More often and at a younger age if you are overweight or have a high risk for diabetes.    What should I know about preventing infection?  Hepatitis B  If you have a higher risk for hepatitis B, you should be screened for this virus. Talk with your health care provider to find out if you are at risk for hepatitis B infection.  Hepatitis C  Testing is recommended for:  All adults aged 18 to 79 years  Anyone with known risk factors for hepatitis C.  Sexually transmitted infections (STIs)  Get screened for STIs, including gonorrhea and chlamydia, if:  You are sexually active and are younger than 24 years of age.  You are older than 24 years of age and your health care provider tells you that you are at risk for this type of infection.  Your sexual activity has changed since you were last screened, and you are at increased risk for chlamydia or gonorrhea. Ask your health care provider if you are at risk.  Ask your health care provider about whether you are at high risk for HIV. Your health care provider may recommend a prescription medicine to help prevent HIV infection. If you choose to take medicine to prevent HIV, you should first get tested for HIV. You should then be tested every 3 months for as long as you are taking the medicine.    Pregnancy  If you are about to stop having your period (premenopausal) and you may become pregnant, seek counseling before you get pregnant.  Take 400 to 800 micrograms (mcg) of folic acid every day if you become pregnant.  Ask for birth control (contraception) if you want to prevent pregnancy.    Osteoporosis and menopause  Osteoporosis is a disease in which the bones lose minerals and strength with aging. This can result in bone fractures. If you are 65 years old  or older, or if you are at risk for osteoporosis and fractures, ask your health care provider if you should:  Be screened for bone loss.  Take a calcium or vitamin D supplement to lower your risk of fractures.  Be given hormone replacement therapy (HRT) to treat symptoms of menopause.    Follow these instructions at home:  Alcohol use  Do not drink alcohol if:  Your health care provider tells you not to drink.  You are pregnant, may be pregnant, or are planning to become pregnant.  If you drink alcohol:  Limit how much you have to:  0-1 drink a day.  Know how much alcohol is in your drink. In the [place] one drink equals one 12 oz bottle of beer (355 mL), one 5 oz glass of wine (148 mL), or one 1½ oz glass of hard liquor (44 mL).  Lifestyle  Do not use any products that contain nicotine or tobacco. These products include cigarettes, chewing tobacco, and vaping devices, such as e-cigarettes. If you need help quitting, ask your health care provider.  Do not use street drugs.  Do not share needles.  Ask your health care provider for help if you need support or information about quitting drugs.    General instructions  Schedule regular health, dental, and eye exams.  Stay current with your vaccines.  Tell your health care provider if:  You often feel depressed.  You have ever been abused or do not feel safe at home.    Summary  Adopting a healthy lifestyle and getting preventive care are important in promoting health and wellness.  Follow your health care provider's instructions about healthy diet, exercising, and getting tested or screened for diseases.  Follow your health care provider's instructions on monitoring your cholesterol and blood pressure.    This information is not intended to replace advice given to you by your health care provider. Make sure you discuss any questions you have with your health care provider.  Document Revised: 05/09/2022 Document Reviewed: 05/09/2022  Elsevier Patient Education © 2023  Elsevier Inc.  Updated 2/29/24 TC   Steps to Quit Smoking  Smoking tobacco is the leading cause of preventable death. It can affect almost every organ in the body. Smoking puts you and those around you at risk for developing many serious chronic diseases.  Quitting smoking can be very challenging. Do not get discouraged if you are not successful the first time. Some people need to make many attempts to quit before they achieve long-term success. Do your best to stick to your quit plan, and talk with your health care provider if you have any questions or concerns.  How do I get ready to quit?  When you decide to quit smoking, create a plan to help you succeed. Before you quit:  Pick a date to quit. Set a date within the next 2 weeks to give you time to prepare.  Write down the reasons why you are quitting. Keep this list in places where you will see it often.  Tell your family, friends, and co-workers that you are quitting. Support from people you are close to can make quitting easier.  Talk with your health care provider about your options for quitting smoking.  Find out what treatment options are covered by your health insurance.  Identify people, places, things, and activities that make you want to smoke (triggers). Avoid them.  What first steps can I take to quit smoking?  Throw away all cigarettes at home, at work, and in your car.  Throw away smoking accessories, such as ashtrays and lighters.  Clean your car. Make sure to empty the ashtray.  Clean your home, including curtains and carpets.  What strategies can I use to quit smoking?  Talk with your health care provider about combining strategies, such as taking medicines while you are also receiving in-person counseling. Using these two strategies together makes you more likely to succeed in quitting than if you used either strategy on its own.  If you are pregnant or breastfeeding, talk with your health care provider about finding counseling or other support  strategies to quit smoking. Do not take medicine to help you quit smoking unless your health care provider tells you to.  Quit right away  Quit smoking completely, instead of gradually reducing how much you smoke over a period of time. Stopping smoking right away may be more successful than gradually quitting.  Attend in-person counseling to help you build problem-solving skills. You are more likely to succeed in quitting if you attend counseling sessions regularly. Even short sessions of 10 minutes can be effective.  Take medicine  You may take medicines to help you quit smoking. Some medicines require a prescription. You can also purchase over-the-counter medicines. Medicines may have nicotine in them to replace the nicotine in cigarettes. Medicines may:  Help to stop cravings.  Help to relieve withdrawal symptoms.  Your health care provider may recommend:  Nicotine patches, gum, or lozenges.  Nicotine inhalers or sprays.  Non-nicotine medicine that you take by mouth.  Find resources  Find resources and support systems that can help you quit smoking and remain smoke-free after you quit. These resources are most helpful when you use them often. They include:  Online chats with a counselor.  Telephone quitlines.  Printed self-help materials.  Support groups or group counseling.  Text messaging programs.  Mobile phone apps or applications. Use apps that can help you stick to your quit plan by providing reminders, tips, and encouragement. Examples of free services include Quit Guide from the CDC and smokefree.gov    What can I do to make it easier to quit?    Reach out to your family and friends for support and encouragement. Call telephone quitlines, such as 1-800-QUIT-NOW, reach out to support groups, or work with a counselor for support.  Ask people who smoke to avoid smoking around you.  Avoid places that trigger you to smoke, such as bars, parties, or smoke-break areas at work.  Spend time with people who do not  smoke.  Lessen the stress in your life. Stress can be a smoking trigger for some people. To lessen stress, try:  Exercising regularly.  Doing deep-breathing exercises.  Doing yoga.  Meditating.  What benefits will I see if I quit smoking?  Over time, you should start to see positive results, such as:  Improved sense of smell and taste.  Decreased coughing and sore throat.  Slower heart rate.  Lower blood pressure.  Clearer and healthier skin.  The ability to breathe more easily.  Fewer sick days.  Summary  Quitting smoking can be very challenging. Do not get discouraged if you are not successful the first time. Some people need to make many attempts to quit before they achieve long-term success.  When you decide to quit smoking, create a plan to help you succeed.  Quit smoking right away, not slowly over a period of time.  Find resources and support systems that can help you quit smoking and remain smoke-free after you quit.  This information is not intended to replace advice given to you by your health care provider. Make sure you discuss any questions you have with your health care provider.  Document Revised: 12/09/2022 Document Reviewed: 12/09/2022  Elsevier Patient Education © 2024 Elsevier Inc.

## 2024-07-31 LAB
AMPHETAMINES UR QL SCN: NEGATIVE NG/ML
BARBITURATES UR QL SCN: NEGATIVE NG/ML
BENZODIAZ UR QL SCN: POSITIVE NG/ML
BZE UR QL SCN: NEGATIVE NG/ML
CANNABINOIDS UR QL SCN: POSITIVE NG/ML
CREAT UR-MCNC: 30.9 MG/DL (ref 20–300)
LABORATORY COMMENT REPORT: ABNORMAL
METHADONE UR QL SCN: NEGATIVE NG/ML
OPIATES UR QL SCN: POSITIVE NG/ML
OXYCODONE+OXYMORPHONE UR QL SCN: NEGATIVE NG/ML
PCP UR QL: NEGATIVE NG/ML
PH UR: 5.7 [PH] (ref 4.5–8.9)
PROPOXYPH UR QL SCN: NEGATIVE NG/ML

## 2024-08-02 LAB
25(OH)D3+25(OH)D2 SERPL-MCNC: 22.6 NG/ML (ref 30–100)
ALBUMIN SERPL-MCNC: 4.1 G/DL (ref 3.9–4.9)
ALP SERPL-CCNC: 83 IU/L (ref 44–121)
ALT SERPL-CCNC: 30 IU/L (ref 0–32)
AST SERPL-CCNC: 31 IU/L (ref 0–40)
BILIRUB SERPL-MCNC: <0.2 MG/DL (ref 0–1.2)
BUN SERPL-MCNC: 8 MG/DL (ref 6–24)
BUN/CREAT SERPL: 11 (ref 9–23)
CALCIUM SERPL-MCNC: 9.6 MG/DL (ref 8.7–10.2)
CHLORIDE SERPL-SCNC: 97 MMOL/L (ref 96–106)
CHOLEST SERPL-MCNC: 211 MG/DL (ref 100–199)
CO2 SERPL-SCNC: 25 MMOL/L (ref 20–29)
CREAT SERPL-MCNC: 0.75 MG/DL (ref 0.57–1)
EGFRCR SERPLBLD CKD-EPI 2021: 100 ML/MIN/1.73
ERYTHROCYTE [DISTWIDTH] IN BLOOD BY AUTOMATED COUNT: 12.6 % (ref 11.7–15.4)
FOLATE SERPL-MCNC: <2 NG/ML
GLOBULIN SER CALC-MCNC: 2.3 G/DL (ref 1.5–4.5)
GLUCOSE SERPL-MCNC: 104 MG/DL (ref 70–99)
HBA1C MFR BLD: 5.6 % (ref 4.8–5.6)
HCT VFR BLD AUTO: 44.1 % (ref 34–46.6)
HCYS SERPL-SCNC: 16.4 UMOL/L (ref 0–14.5)
HDLC SERPL-MCNC: 35 MG/DL
HGB BLD-MCNC: 14.8 G/DL (ref 11.1–15.9)
LDLC SERPL CALC-MCNC: 136 MG/DL (ref 0–99)
MCH RBC QN AUTO: 32.4 PG (ref 26.6–33)
MCHC RBC AUTO-ENTMCNC: 33.6 G/DL (ref 31.5–35.7)
MCV RBC AUTO: 97 FL (ref 79–97)
METHYLMALONATE SERPL-SCNC: 354 NMOL/L (ref 0–378)
PLATELET # BLD AUTO: 228 X10E3/UL (ref 150–450)
POTASSIUM SERPL-SCNC: 3.4 MMOL/L (ref 3.5–5.2)
PROT SERPL-MCNC: 6.4 G/DL (ref 6–8.5)
PYRIDOXAL PHOS SERPL-MCNC: 35.2 UG/L (ref 3.4–65.2)
RBC # BLD AUTO: 4.57 X10E6/UL (ref 3.77–5.28)
SODIUM SERPL-SCNC: 139 MMOL/L (ref 134–144)
TRIGL SERPL-MCNC: 222 MG/DL (ref 0–149)
TSH SERPL DL<=0.005 MIU/L-ACNC: 1.17 UIU/ML (ref 0.45–4.5)
VIT B12 SERPL-MCNC: 293 PG/ML (ref 232–1245)
VLDLC SERPL CALC-MCNC: 40 MG/DL (ref 5–40)
WBC # BLD AUTO: 8.8 X10E3/UL (ref 3.4–10.8)

## 2024-08-07 ENCOUNTER — PRIOR AUTHORIZATION (OUTPATIENT)
Dept: INTERNAL MEDICINE | Facility: CLINIC | Age: 44
End: 2024-08-07
Payer: MEDICARE

## 2024-08-09 ENCOUNTER — OFFICE VISIT (OUTPATIENT)
Dept: OBSTETRICS AND GYNECOLOGY | Facility: CLINIC | Age: 44
End: 2024-08-09
Payer: MEDICARE

## 2024-08-09 VITALS
BODY MASS INDEX: 32.76 KG/M2 | SYSTOLIC BLOOD PRESSURE: 118 MMHG | WEIGHT: 196.6 LBS | HEIGHT: 65 IN | DIASTOLIC BLOOD PRESSURE: 62 MMHG

## 2024-08-09 DIAGNOSIS — Z01.419 ENCOUNTER FOR GYNECOLOGICAL EXAMINATION WITHOUT ABNORMAL FINDING: Primary | ICD-10-CM

## 2024-08-09 NOTE — PROGRESS NOTES
Subjective   Chief Complaint   Patient presents with    Gynecologic Exam     Yearly exam and pap smear     Heidi Franklin is a 44 y.o. year old .  No LMP recorded. Patient has had an implant.  She presents to be seen because of annual exam.     OTHER COMPLAINTS:  Mirena     The following portions of the patient's history were reviewed and updated as appropriate:She  has a past medical history of Anxiety, Arthritis, Carpal tunnel syndrome, Cervical disc herniation (2016), Encounter for insertion of mirena IUD (2016), Encounter for insertion of mirena IUD (2016), Encounter for insertion of mirena IUD (2021), Essential hypertension (2021), Headache, History of Papanicolaou smear of cervix (10/20/2015), History of Papanicolaou smear of cervix (2016), Low back pain, and Scoliosis.  She does not have any pertinent problems on file.  She  has a past surgical history that includes Breast surgery (Right);  section; Interventional radiology procedure (N/A, 2017); Other surgical history; Spine surgery (2017, 2018); Breast biopsy; Spine surgery (2019); US Guided Cyst Aspiration Breast (N/A, 2020); US Guided Cyst Aspiration Breast (N/A, 2020); US Guided Cyst Aspiration Breast (N/A, 2021); and Breast cyst aspiration.  Her family history includes Arthritis in her maternal aunt, maternal aunt, and maternal grandmother; COPD in her mother and another family member; Cancer in her mother; Drug abuse in her maternal aunt; Lupus in her maternal aunt; Thyroid disease in her maternal aunt and mother; Uterine cancer in her maternal aunt.  She  reports that she has been smoking cigarettes. She has a 22.5 pack-year smoking history. She has never used smokeless tobacco. She reports that she does not drink alcohol and does not use drugs.  Current Outpatient Medications   Medication Sig Dispense Refill    acetaminophen (TYLENOL) 500 MG  tablet Take 1 tablet by mouth Every 6 (Six) Hours As Needed for Mild Pain.      albuterol sulfate HFA (Ventolin HFA) 108 (90 Base) MCG/ACT inhaler Inhale 2 puffs Every 6 (Six) Hours As Needed for Wheezing or Shortness of Air. 18 g 11    celecoxib (CeleBREX) 200 MG capsule Take 1 capsule by mouth 2 (Two) Times a Day. For arthritic pain 180 capsule 3    chlorthalidone (HYGROTON) 25 MG tablet Take 1 tablet by mouth Daily. For high blood pressure. 90 tablet 3    Cholecalciferol 25 MCG (1000 UT) capsule Take 1 capsule by mouth Daily. Indications: Vitamin D Deficiency 90 capsule 3    clotrimazole (LOTRIMIN) 1 % cream Apply  topically to the appropriate area as directed 2 (Two) Times a Day. 60 g 3    desvenlafaxine (Pristiq) 50 MG 24 hr tablet Take 1 tablet by mouth Daily. 90 tablet 3    diazePAM (VALIUM) 5 MG tablet Take 1 tablet by mouth 3 (Three) Times a Day As Needed for Muscle Spasms. 90 tablet 5    HYDROcodone-acetaminophen (NORCO) 5-325 MG per tablet Take 5 mg of hydrocodone by mouth.      levothyroxine (SYNTHROID, LEVOTHROID) 50 MCG tablet TAKE 1 TABLET BY MOUTH EVERY DAY(TAKE ON AN EMPTY STOMACH 30 MINUTES BEFORE EATING AND OTHER MEDS) 90 tablet 3    metoprolol succinate XL (Toprol XL) 50 MG 24 hr tablet Take 1 tablet by mouth Daily. Indications: High Blood Pressure Disorder 90 tablet 3    naratriptan (AMERGE) 2.5 MG tablet Take 1 tablet by mouth Daily As Needed for Migraine. 2.5 mg at onset of headache, may repeat in 4 hours if needed 9 tablet 11    Rimegepant Sulfate (Nurtec) 75 MG tablet dispersible tablet Take 1 tablet by mouth Daily As Needed (migraine). 8 tablet 11     No current facility-administered medications for this visit.     Current Outpatient Medications on File Prior to Visit   Medication Sig    acetaminophen (TYLENOL) 500 MG tablet Take 1 tablet by mouth Every 6 (Six) Hours As Needed for Mild Pain.    albuterol sulfate HFA (Ventolin HFA) 108 (90 Base) MCG/ACT inhaler Inhale 2 puffs Every 6 (Six)  Hours As Needed for Wheezing or Shortness of Air.    celecoxib (CeleBREX) 200 MG capsule Take 1 capsule by mouth 2 (Two) Times a Day. For arthritic pain    chlorthalidone (HYGROTON) 25 MG tablet Take 1 tablet by mouth Daily. For high blood pressure.    Cholecalciferol 25 MCG (1000 UT) capsule Take 1 capsule by mouth Daily. Indications: Vitamin D Deficiency    clotrimazole (LOTRIMIN) 1 % cream Apply  topically to the appropriate area as directed 2 (Two) Times a Day.    desvenlafaxine (Pristiq) 50 MG 24 hr tablet Take 1 tablet by mouth Daily.    diazePAM (VALIUM) 5 MG tablet Take 1 tablet by mouth 3 (Three) Times a Day As Needed for Muscle Spasms.    HYDROcodone-acetaminophen (NORCO) 5-325 MG per tablet Take 5 mg of hydrocodone by mouth.    levothyroxine (SYNTHROID, LEVOTHROID) 50 MCG tablet TAKE 1 TABLET BY MOUTH EVERY DAY(TAKE ON AN EMPTY STOMACH 30 MINUTES BEFORE EATING AND OTHER MEDS)    metoprolol succinate XL (Toprol XL) 50 MG 24 hr tablet Take 1 tablet by mouth Daily. Indications: High Blood Pressure Disorder    naratriptan (AMERGE) 2.5 MG tablet Take 1 tablet by mouth Daily As Needed for Migraine. 2.5 mg at onset of headache, may repeat in 4 hours if needed    Rimegepant Sulfate (Nurtec) 75 MG tablet dispersible tablet Take 1 tablet by mouth Daily As Needed (migraine).    [DISCONTINUED] Levonorgestrel (MIRENA, 52 MG, IU) by Intrauterine route.     No current facility-administered medications on file prior to visit.     She is allergic to citalopram.    Social History    Tobacco Use      Smoking status: Every Day        Packs/day: 1.50        Years: 1.5 packs/day for 15.0 years (22.5 ttl pk-yrs)        Types: Cigarettes      Smokeless tobacco: Never    Review of Systems  Consitutional POS: nothing reported    NEG: anorexia or night sweats   Gastointestinal POS: nothing reported    NEG: bloating, change in bowel habits, melena, or reflux symptoms   Genitourinary POS: nothing reported    NEG: dysuria or hematuria  "  Integument POS: nothing reported    NEG: moles that are changing in size, shape, color or rashes   Breast POS: nothing reported    NEG: persistent breast lump, skin dimpling, or nipple discharge         Respiratory: negative  Cardiovascular: negative  GYN:  negative          Objective   /62   Ht 165.1 cm (65\")   Wt 89.2 kg (196 lb 9.6 oz)   BMI 32.72 kg/m²     General:  well developed; well nourished  no acute distress   Skin:  No suspicious lesions seen   Thyroid: normal to inspection and palpation   Lungs:  breathing is unlabored  clear to auscultation bilaterally   Heart:  regular rate and rhythm, S1, S2 normal, no murmur, click, rub or gallop   Breasts:  Examined in supine position  Symmetric without masses or skin dimpling  Nipples normal without inversion, lesions or discharge  There are no palpable axillary nodes   Abdomen: soft, non-tender; no masses  no umbilical or inguinal hernias are present  no hepato-splenomegaly   Pelvis: Clinical staff was present for exam  External genitalia:  normal appearance of the external genitalia including Bartholin's and Silas's glands.  :  urethral meatus normal;  Vaginal:  normal pink mucosa without prolapse or lesions.  Cervix:  normal appearance.  Uterus:  normal size, shape and consistency.  Adnexa:  normal bimanual exam of the adnexa.  Rectal:  digital rectal exam not performed; anus visually normal appearing.     Psychiatric: Alert and oriented ×3, mood and affect appropriate  HEENT: Atraumatic, normocephalic, normal scleral icterus  Extremities: 2+ pulses bilaterally, no edema      Lab Review   CBC and CMP    Imaging   Mammo Screening Digital Tomosynthesis Bilateral With CAD (08/11/2023 16:18)        Assessment   Pe WNL  Strings visible     Plan   PAP done  MMG next week  Diet/exercise    No orders of the defined types were placed in this encounter.         This note was electronically signed.      August 9, 2024      "

## 2024-08-12 ENCOUNTER — HOSPITAL ENCOUNTER (OUTPATIENT)
Dept: MAMMOGRAPHY | Facility: HOSPITAL | Age: 44
Discharge: HOME | End: 2024-08-12
Admitting: SURGERY
Payer: MEDICARE

## 2024-08-12 DIAGNOSIS — Z12.31 SCREENING MAMMOGRAM FOR BREAST CANCER: ICD-10-CM

## 2024-08-12 PROCEDURE — 77067 SCR MAMMO BI INCL CAD: CPT

## 2024-08-12 PROCEDURE — 77063 BREAST TOMOSYNTHESIS BI: CPT

## 2024-08-17 LAB — REF LAB TEST METHOD: NORMAL

## 2024-08-22 ENCOUNTER — TELEPHONE (OUTPATIENT)
Dept: SURGERY | Facility: CLINIC | Age: 44
End: 2024-08-22
Payer: MEDICARE

## 2024-08-23 NOTE — PROGRESS NOTES
Patient: Heidi Franklin    YOB: 1980    Date: 08/28/2024    Primary Care Provider: Martine Evans MD    Chief Complaint   Patient presents with    Follow-up     mammogram       SUBJECTIVE:    History of present illness:  Patient is s/p right breast biopsy which was performed in the remote past, it was benign.  Patient is here for follow-up mammogram which was performed 08/12/2024, it was read BI-RADS category 2 with benign findings.    She does have a history significant for benign breast disease, she has dense breast bilaterally.    She also states that she recently had a positive Cologuard, she has never had a previous colonoscopy.    The following portions of the patient's history were reviewed and updated as appropriate: allergies, current medications, past family history, past medical history, past social history, past surgical history and problem list.      Review of Systems    Allergies:  Allergies   Allergen Reactions    Citalopram Other (See Comments) and Unknown - High Severity       Medications:    Current Outpatient Medications:     acetaminophen (TYLENOL) 500 MG tablet, Take 1 tablet by mouth Every 6 (Six) Hours As Needed for Mild Pain., Disp: , Rfl:     albuterol sulfate HFA (Ventolin HFA) 108 (90 Base) MCG/ACT inhaler, Inhale 2 puffs Every 6 (Six) Hours As Needed for Wheezing or Shortness of Air., Disp: 18 g, Rfl: 11    celecoxib (CeleBREX) 200 MG capsule, Take 1 capsule by mouth 2 (Two) Times a Day. For arthritic pain, Disp: 180 capsule, Rfl: 3    chlorthalidone (HYGROTON) 25 MG tablet, Take 1 tablet by mouth Daily. For high blood pressure., Disp: 90 tablet, Rfl: 3    Cholecalciferol 25 MCG (1000 UT) capsule, Take 1 capsule by mouth Daily. Indications: Vitamin D Deficiency, Disp: 90 capsule, Rfl: 3    clotrimazole (LOTRIMIN) 1 % cream, Apply  topically to the appropriate area as directed 2 (Two) Times a Day., Disp: 60 g, Rfl: 3    desvenlafaxine (Pristiq) 50 MG 24 hr tablet, Take  1 tablet by mouth Daily., Disp: 90 tablet, Rfl: 3    diazePAM (VALIUM) 5 MG tablet, Take 1 tablet by mouth 3 (Three) Times a Day As Needed for Muscle Spasms., Disp: 90 tablet, Rfl: 5    HYDROcodone-acetaminophen (NORCO) 5-325 MG per tablet, Take 5 mg of hydrocodone by mouth., Disp: , Rfl:     levothyroxine (SYNTHROID, LEVOTHROID) 50 MCG tablet, TAKE 1 TABLET BY MOUTH EVERY DAY(TAKE ON AN EMPTY STOMACH 30 MINUTES BEFORE EATING AND OTHER MEDS), Disp: 90 tablet, Rfl: 3    metoprolol succinate XL (Toprol XL) 50 MG 24 hr tablet, Take 1 tablet by mouth Daily. Indications: High Blood Pressure Disorder, Disp: 90 tablet, Rfl: 3    naratriptan (AMERGE) 2.5 MG tablet, Take 1 tablet by mouth Daily As Needed for Migraine. 2.5 mg at onset of headache, may repeat in 4 hours if needed, Disp: 9 tablet, Rfl: 11    Rimegepant Sulfate (Nurtec) 75 MG tablet dispersible tablet, Take 1 tablet by mouth Daily As Needed (migraine)., Disp: 8 tablet, Rfl: 11    History:  Past Medical History:   Diagnosis Date    Anxiety     Arthritis     Carpal tunnel syndrome     right    Cervical disc herniation 2016    Encounter for insertion of mirena IUD 2016    Encounter for insertion of mirena IUD 2016    Encounter for insertion of mirena IUD 2021    Essential hypertension 2021    Headache     History of Papanicolaou smear of cervix 10/20/2015    WNL    History of Papanicolaou smear of cervix 2016    WNL    Low back pain     Scoliosis        Past Surgical History:   Procedure Laterality Date    BREAST BIOPSY      BREAST CYST ASPIRATION      BREAST SURGERY Right      SECTION      INTERVENTIONAL RADIOLOGY PROCEDURE N/A 2017    Procedure:  myelogram cervical spine;  Surgeon: Moody Fitch MD;  Location: [facility] INVASIVE LOCATION;  Service:     OTHER SURGICAL HISTORY      Breast Mammatome, Needle Biopsy, ACDF     SPINE SURGERY  2017, 2018    ACDF C5-6 and C6-7, ACDF C4-5    SPINE  "SURGERY  2019    3rd attempt    US GUIDED CYST ASPIRATION BREAST N/A 2020    US GUIDED CYST ASPIRATION BREAST N/A 2020    US GUIDED CYST ASPIRATION BREAST N/A 2021       Family History   Problem Relation Age of Onset    Cancer Mother         Throat Cancer-    COPD Mother              Thyroid disease Mother     Arthritis Maternal Grandmother         Spine    Arthritis Maternal Aunt         Spine    Lupus Maternal Aunt     Arthritis Maternal Aunt         Spine    Drug abuse Maternal Aunt              Uterine cancer Maternal Aunt     Thyroid disease Maternal Aunt     COPD Other     Breast cancer Neg Hx        Social History     Tobacco Use    Smoking status: Every Day     Current packs/day: 1.50     Average packs/day: 1.5 packs/day for 15.0 years (22.5 ttl pk-yrs)     Types: Cigarettes    Smokeless tobacco: Never   Vaping Use    Vaping status: Never Used   Substance Use Topics    Alcohol use: No    Drug use: No        OBJECTIVE:    Vital Signs:   Vitals:    24 1413   BP: 118/64   Pulse: 101   Resp: 16   Temp: 97.7 °F (36.5 °C)   TempSrc: Temporal   SpO2: 94%   Weight: 88.4 kg (194 lb 12.8 oz)   Height: 165.1 cm (65\")       Physical Exam:     General Appearance:    Alert, cooperative, in no acute distress   Head:    Normocephalic, without obvious abnormality, atraumatic   Eyes:            Lids and lashes normal, conjunctivae and sclerae normal, no   icterus, no pallor, corneas clear, PERRLA   Ears:    Ears appear intact with no abnormalities noted   Throat:   No oral lesions, no thrush, oral mucosa moist   Neck:   No adenopathy, supple, trachea midline, no thyromegaly, no   carotid bruit, no JVD   Back:     No kyphosis present, no scoliosis present, no skin lesions,      erythema or scars, no tenderness to percussion or                   palpation,   range of motion normal   Lungs:     Clear to auscultation,respirations regular, even and                  " unlabored    Heart:    Regular rhythm and normal rate, normal S1 and S2, no            murmur, no gallop, no rub, no click   Chest Wall:    No abnormalities observed   Abdomen:     Normal bowel sounds, no masses, no organomegaly, soft        non-tender, non-distended, no guarding,    Extremities:   Moves all extremities well, no edema, no cyanosis, no             redness   Pulses:   Pulses palpable and equal bilaterally   Skin:   No bleeding, bruising or rash   Lymph nodes:   No palpable adenopathy   Neurologic:   Cranial nerves 2 - 12 grossly intact, sensation intact, DTR       present and equal bilaterally     Results Review:   I reviewed the patient's new clinical results.  I reviewed the patient's new imaging results and agree with the interpretation.  I reviewed the patient's other test results and agree with the interpretation    Review of Systems was reviewed and confirmed as accurate as documented by the MA.    ASSESSMENT/PLAN:    1. Bilateral breast cysts    2. Positive colorectal cancer screening using Cologuard test        Breast examination today was benign bilaterally, ultrasound showed dense breast tissue only.  I think the patient needs to be seen back after her next scheduled mammogram in 1 year.    She needs to undergo colonoscopy because of her recent positive Cologuard.  Risk and benefits of operative versus nonoperative intervention have been discussed with the patient, she understands and agrees, and wishes to proceed with the colonoscopy.  She wants me to perform this procedure.    I discussed the patients findings and my recommendations with patient        Electronically signed by Rizwan Perera MD  08/28/24

## 2024-08-28 ENCOUNTER — OFFICE VISIT (OUTPATIENT)
Dept: SURGERY | Facility: CLINIC | Age: 44
End: 2024-08-28
Payer: MEDICARE

## 2024-08-28 VITALS
WEIGHT: 194.8 LBS | DIASTOLIC BLOOD PRESSURE: 64 MMHG | HEIGHT: 65 IN | SYSTOLIC BLOOD PRESSURE: 118 MMHG | OXYGEN SATURATION: 94 % | TEMPERATURE: 97.7 F | HEART RATE: 101 BPM | RESPIRATION RATE: 16 BRPM | BODY MASS INDEX: 32.46 KG/M2

## 2024-08-28 DIAGNOSIS — R19.5 POSITIVE COLORECTAL CANCER SCREENING USING COLOGUARD TEST: ICD-10-CM

## 2024-08-28 DIAGNOSIS — N60.02 BILATERAL BREAST CYSTS: Primary | ICD-10-CM

## 2024-08-28 DIAGNOSIS — N60.01 BILATERAL BREAST CYSTS: Primary | ICD-10-CM

## 2024-08-28 PROCEDURE — 99213 OFFICE O/P EST LOW 20 MIN: CPT | Performed by: SURGERY

## 2024-08-28 PROCEDURE — 1160F RVW MEDS BY RX/DR IN RCRD: CPT | Performed by: SURGERY

## 2024-08-28 PROCEDURE — 3074F SYST BP LT 130 MM HG: CPT | Performed by: SURGERY

## 2024-08-28 PROCEDURE — 1159F MED LIST DOCD IN RCRD: CPT | Performed by: SURGERY

## 2024-08-28 PROCEDURE — 3078F DIAST BP <80 MM HG: CPT | Performed by: SURGERY

## 2024-09-18 RX ORDER — POLYETHYLENE GLYCOL 3350 17 G/17G
POWDER, FOR SOLUTION ORAL
Qty: 238 G | Refills: 0 | Status: SHIPPED | OUTPATIENT
Start: 2024-09-18

## 2024-09-18 RX ORDER — BISACODYL 5 MG/1
TABLET, DELAYED RELEASE ORAL
Qty: 4 TABLET | Refills: 0 | Status: SHIPPED | OUTPATIENT
Start: 2024-09-18

## 2024-09-20 ENCOUNTER — OUTSIDE FACILITY SERVICE (OUTPATIENT)
Dept: SURGERY | Facility: CLINIC | Age: 44
End: 2024-09-20
Payer: MEDICARE

## 2024-09-20 ENCOUNTER — TELEPHONE (OUTPATIENT)
Dept: INTERNAL MEDICINE | Facility: CLINIC | Age: 44
End: 2024-09-20
Payer: MEDICARE

## 2024-09-20 PROCEDURE — 45384 COLONOSCOPY W/LESION REMOVAL: CPT | Performed by: SURGERY

## 2024-09-20 PROCEDURE — 45380 COLONOSCOPY AND BIOPSY: CPT | Performed by: SURGERY

## 2024-09-20 PROCEDURE — 45385 COLONOSCOPY W/LESION REMOVAL: CPT | Performed by: SURGERY

## 2024-10-03 DIAGNOSIS — M54.2 CHRONIC NECK PAIN: ICD-10-CM

## 2024-10-03 DIAGNOSIS — G89.29 CHRONIC NECK PAIN: ICD-10-CM

## 2024-10-04 RX ORDER — DIAZEPAM 5 MG
5 TABLET ORAL 3 TIMES DAILY PRN
Qty: 90 TABLET | Refills: 4 | Status: SHIPPED | OUTPATIENT
Start: 2024-10-04

## 2024-10-04 NOTE — TELEPHONE ENCOUNTER
UDS:7/29/24    No CSA on file.      Rx Refill Note  Requested Prescriptions     Pending Prescriptions Disp Refills    diazePAM (VALIUM) 5 MG tablet [Pharmacy Med Name: DIAZEPAM 5MG TABLETS] 90 tablet      Sig: TAKE 1 TABLET BY MOUTH THREE TIMES DAILY AS NEEDED FOR MUSCLE SPASMS      Last office visit with prescribing clinician: 7/29/2024   Last telemedicine visit with prescribing clinician: Visit date not found   Next office visit with prescribing clinician: 1/29/2025                         Brenda Ash MA  10/04/24, 09:03 EDT

## 2024-10-25 DIAGNOSIS — G89.29 CHRONIC NECK PAIN: ICD-10-CM

## 2024-10-25 DIAGNOSIS — I10 ESSENTIAL HYPERTENSION: ICD-10-CM

## 2024-10-25 DIAGNOSIS — M54.2 CHRONIC NECK PAIN: ICD-10-CM

## 2024-10-25 DIAGNOSIS — M54.12 CERVICAL RADICULOPATHY: ICD-10-CM

## 2024-10-25 RX ORDER — CHLORTHALIDONE 25 MG/1
25 TABLET ORAL DAILY
Qty: 90 TABLET | Refills: 3 | Status: SHIPPED | OUTPATIENT
Start: 2024-10-25

## 2024-10-25 RX ORDER — CELECOXIB 200 MG/1
200 CAPSULE ORAL 2 TIMES DAILY
Qty: 180 CAPSULE | Refills: 3 | Status: SHIPPED | OUTPATIENT
Start: 2024-10-25

## 2024-11-25 DIAGNOSIS — I10 ESSENTIAL HYPERTENSION: ICD-10-CM

## 2024-11-25 RX ORDER — METOPROLOL SUCCINATE 50 MG/1
50 TABLET, EXTENDED RELEASE ORAL DAILY
Qty: 90 TABLET | Refills: 3 | Status: SHIPPED | OUTPATIENT
Start: 2024-11-25

## 2024-12-10 DIAGNOSIS — G89.29 CHRONIC NECK PAIN: ICD-10-CM

## 2024-12-10 DIAGNOSIS — M54.2 CHRONIC NECK PAIN: ICD-10-CM

## 2024-12-10 DIAGNOSIS — F33.1 MODERATE EPISODE OF RECURRENT MAJOR DEPRESSIVE DISORDER: ICD-10-CM

## 2024-12-10 DIAGNOSIS — F41.9 ANXIETY: ICD-10-CM

## 2024-12-10 RX ORDER — DIAZEPAM 5 MG/1
5 TABLET ORAL 3 TIMES DAILY PRN
Qty: 90 TABLET | Refills: 4 | OUTPATIENT
Start: 2024-12-10

## 2024-12-10 RX ORDER — DESVENLAFAXINE 50 MG/1
50 TABLET, FILM COATED, EXTENDED RELEASE ORAL DAILY
Qty: 90 TABLET | Refills: 3 | OUTPATIENT
Start: 2024-12-10

## 2024-12-11 ENCOUNTER — PRIOR AUTHORIZATION (OUTPATIENT)
Dept: INTERNAL MEDICINE | Facility: CLINIC | Age: 44
End: 2024-12-11
Payer: MEDICARE

## 2024-12-11 NOTE — TELEPHONE ENCOUNTER
Heidi Franklin (Key: [id number])  PA Case ID #: [id number]  Need Help? Call us at [phone]  Status  sent iconSent to Plan today  Drug  Nurtec 75MG dispersible tablets  Form  [employer] Electronic PA Form

## 2024-12-12 DIAGNOSIS — F41.9 ANXIETY: ICD-10-CM

## 2024-12-12 DIAGNOSIS — M54.2 CHRONIC NECK PAIN: ICD-10-CM

## 2024-12-12 DIAGNOSIS — F33.1 MODERATE EPISODE OF RECURRENT MAJOR DEPRESSIVE DISORDER: ICD-10-CM

## 2024-12-12 DIAGNOSIS — G89.29 CHRONIC NECK PAIN: ICD-10-CM

## 2024-12-12 RX ORDER — DESVENLAFAXINE 50 MG/1
50 TABLET, FILM COATED, EXTENDED RELEASE ORAL DAILY
Qty: 90 TABLET | Refills: 3 | OUTPATIENT
Start: 2024-12-12

## 2024-12-12 RX ORDER — DIAZEPAM 5 MG/1
5 TABLET ORAL 3 TIMES DAILY PRN
Qty: 90 TABLET | Refills: 4 | OUTPATIENT
Start: 2024-12-12

## 2024-12-13 NOTE — TELEPHONE ENCOUNTER
Outcome  Approved on December 12 by [facility] 2017  PA Case: [id number], Status: Approved, Coverage Starts on: 1/1/2024 12:00:00 AM, Coverage Ends on: 12/31/2025 12:00:00 AM. Questions? Contact [phone].  Authorization Expiration Date: 12/30/2025

## 2024-12-31 DIAGNOSIS — E03.9 ACQUIRED HYPOTHYROIDISM: ICD-10-CM

## 2025-01-02 RX ORDER — LEVOTHYROXINE SODIUM 50 UG/1
50 TABLET ORAL DAILY
Qty: 90 TABLET | Refills: 3 | Status: SHIPPED | OUTPATIENT
Start: 2025-01-02

## 2025-01-29 ENCOUNTER — OFFICE VISIT (OUTPATIENT)
Dept: INTERNAL MEDICINE | Facility: CLINIC | Age: 45
End: 2025-01-29
Payer: MEDICARE

## 2025-01-29 VITALS
SYSTOLIC BLOOD PRESSURE: 122 MMHG | TEMPERATURE: 97.3 F | HEART RATE: 82 BPM | DIASTOLIC BLOOD PRESSURE: 80 MMHG | WEIGHT: 193.2 LBS | HEIGHT: 65 IN | BODY MASS INDEX: 32.19 KG/M2 | OXYGEN SATURATION: 98 %

## 2025-01-29 DIAGNOSIS — E03.9 ACQUIRED HYPOTHYROIDISM: ICD-10-CM

## 2025-01-29 DIAGNOSIS — G89.29 CHRONIC NECK PAIN: ICD-10-CM

## 2025-01-29 DIAGNOSIS — M54.2 CHRONIC NECK PAIN: ICD-10-CM

## 2025-01-29 DIAGNOSIS — F41.9 ANXIETY: ICD-10-CM

## 2025-01-29 DIAGNOSIS — F33.8 SEASONAL AFFECTIVE DISORDER: ICD-10-CM

## 2025-01-29 DIAGNOSIS — E53.8 VITAMIN B12 DEFICIENCY: ICD-10-CM

## 2025-01-29 DIAGNOSIS — E53.8 FOLIC ACID DEFICIENCY: Primary | ICD-10-CM

## 2025-01-29 DIAGNOSIS — F33.1 MODERATE EPISODE OF RECURRENT MAJOR DEPRESSIVE DISORDER: ICD-10-CM

## 2025-01-29 DIAGNOSIS — I10 ESSENTIAL HYPERTENSION: ICD-10-CM

## 2025-01-29 PROCEDURE — 3079F DIAST BP 80-89 MM HG: CPT | Performed by: FAMILY MEDICINE

## 2025-01-29 PROCEDURE — 99214 OFFICE O/P EST MOD 30 MIN: CPT | Performed by: FAMILY MEDICINE

## 2025-01-29 PROCEDURE — G2211 COMPLEX E/M VISIT ADD ON: HCPCS | Performed by: FAMILY MEDICINE

## 2025-01-29 PROCEDURE — 1159F MED LIST DOCD IN RCRD: CPT | Performed by: FAMILY MEDICINE

## 2025-01-29 PROCEDURE — 1160F RVW MEDS BY RX/DR IN RCRD: CPT | Performed by: FAMILY MEDICINE

## 2025-01-29 PROCEDURE — 3074F SYST BP LT 130 MM HG: CPT | Performed by: FAMILY MEDICINE

## 2025-01-29 PROCEDURE — 1126F AMNT PAIN NOTED NONE PRSNT: CPT | Performed by: FAMILY MEDICINE

## 2025-01-29 RX ORDER — LANOLIN ALCOHOL/MO/W.PET/CERES
1000 CREAM (GRAM) TOPICAL DAILY
Qty: 90 TABLET | Refills: 4 | Status: SHIPPED | OUTPATIENT
Start: 2025-01-29

## 2025-01-29 RX ORDER — FOLIC ACID 1 MG/1
1 TABLET ORAL DAILY
Qty: 90 TABLET | Refills: 3 | Status: SHIPPED | OUTPATIENT
Start: 2025-01-29

## 2025-01-29 RX ORDER — DESVENLAFAXINE 100 MG/1
100 TABLET, EXTENDED RELEASE ORAL DAILY
Qty: 90 TABLET | Refills: 3 | Status: SHIPPED | OUTPATIENT
Start: 2025-01-29

## 2025-01-29 NOTE — ASSESSMENT & PLAN NOTE
Plan to do labs next time. Will discuss at her next appointment. Take levothyroxine daily even if she can't take on empty stomach.

## 2025-01-29 NOTE — ASSESSMENT & PLAN NOTE
Hypertension is stable and controlled  Continue current treatment regimen.  Blood pressure will be reassessed  at next visit  .

## 2025-01-29 NOTE — PATIENT INSTRUCTIONS
Folate Deficiency: Symptoms, Causes & Prevention ([email or web address])    Folate Deficiency  Folate is a B vitamin that your body needs to work properly. Folate is especially important for people who are pregnant. Folate deficiency occurs when your body doesn't get enough folate. Symptoms include fatigue, weakness, mouth sores and neurological issues. Folate deficiency can be prevented by eating a diet rich in foods that contain folate.  Overview  What is folate deficiency?  Folate deficiency is when your blood lacks the amount of vitamin B9 (folate) it needs to function properly. Folate deficiency can cause a wide range of symptoms and complications.  What is folate?  Folate is a B vitamin found naturally in many of the foods you eat. These foods include leafy greens, citrus fruits, nuts, beans, peas, seafood, eggs, dairy, meat, poultry and grains. Your body needs folate to make new red blood cells and DNA, the genetic material in your cells. Folate is especially important for people who are pregnant. Folate helps in the growth and development of the fetus and can help prevent birth defects.  What is folic acid?  Folic acid is a manmade (synthetic) form of folate. Your body can't store large amounts of natural folate. But your body can easily absorb folic acid. As a result, it's added to some of the foods you eat. Grains such as rice, bread, pasta and some cereals are enriched (fortified) with folic acid. Folic acid is also available as a dietary supplement.  What complications can occur due to folate deficiency?  When you don't get enough folate, several complications can occur.  Folate deficiency during pregnancy  Folate deficiency during pregnancy can cause severe complications. Folate is important for the growth of the fetus's brain and spinal cord. Folate deficiency can cause severe birth defects called neural tube defects. Neural tube defects include spina bifida and anencephaly.  Folate deficiency can also  increase your chances of placental abruption, a condition where your placenta separates from your uterus. In addition, your baby may be premature ( birth) and/or have a low birth weight. Studies have also shown low folate during pregnancy could lead to the development of autism in your child.  Folate deficiency anemia  Folate deficiency can also lead to folate deficiency anemia. Anemia can happen when your body doesn't have enough healthy red blood cells. Your body needs red blood cells to carry oxygen to your body tissues. Folate deficiency anemia can also cause your body to produce abnormally large red blood cells that don't function properly.  Other complications of folate deficiency can include:  Infertility.  Certain cancers.  Cardiovascular disease.  Depression.  Dementia.  Decreased cognitive function.  Alzheimer's disease.  Symptoms and Causes  What are the symptoms of folate deficiency?  One of the first symptoms of folate deficiency is extreme tiredness (fatigue). Other symptoms may include:  Anemia symptoms  Paleness.  Shortness of breath (dyspnea).  Irritability.  Dizziness.  Oral symptoms  Tender, red tongue.  Mouth sores or mouth ulcers.  Reduced sense of taste.  Neurological symptoms  Memory loss.  Difficulty concentrating.  Confusion.  Problems with judgment.  Additional symptoms of folate deficiency may include:  Lack of energy.  Muscle weakness.  Depression.  Weight loss.  Diarrhea.  What causes folate deficiency?  One of the most common causes of folate deficiency is not eating a healthy, balanced diet. A healthy diet includes foods that naturally contain folate or are enriched with folic acid. Other causes of folate deficiency can include:  Digestive system diseases: Your digestive system doesn't absorb folic acid well if you have a disease such as Crohn's disease or celiac disease.  Excessive alcohol use: People who drink large amounts of alcohol sometimes substitute alcohol for food. As  a result, they don't get enough folate.  Overcooking your fruits and vegetables: When you overcook, the heat can destroy the naturally occurring folate in your produce.  Hemolytic anemia: A blood disorder that occurs when your red blood cells are destroyed and can't be replaced fast enough.  Certain medications: Some anti-seizure drugs and ulcerative colitis drugs interfere with the proper absorption of folate.  Kidney dialysis: A treatment for people with kidney failure.  Diagnosis and Tests  How is folate deficiency diagnosed?  Your healthcare provider will ask about your medical history and your symptoms. They can diagnose folate deficiency through a blood test. The blood test measures the amount of folate in your blood. A low level of folate indicates a folate deficiency.  Management and Treatment  How is folate deficiency treated?  Your healthcare provider will treat your folate deficiency with a folic acid supplement. Most adults need 400 micrograms (mcg) of folic acid each day. Your healthcare provider will let you know how much you should take.  Your healthcare provider will also advise you to eat a healthy, balanced diet. A balanced diet includes fruits, vegetables and other foods that contain folate or are enriched with folic acid.  Prevention  How can I prevent folate deficiency?  The best way to prevent folate deficiency is to eat a healthy diet that includes foods that contain folate or folic acid. Folate can be found naturally in:  Peas, beans and legumes.  Citrus fruits.  Dark green leafy vegetables.  Liver.  Seafood.  Eggs and dairy.  Meat and poultry.  Folic acid can be found in enriched or fortified:  Bread.  Flour.  Pasta.  Rice.  Cereal.  The amount of folate you need every day depends on your age and other factors. Most adults should get 400 micrograms (mcg) of folate daily. People who are pregnant should take a folic acid supplement to make sure they're getting enough folate each day. The  average daily recommended amount of folate you need are:  Age/Life Stage Recommended Amount of Dietary Folate Equivalents (DFEs)   Birth to age 6 months 65 mcg DFE   Infants ages 7 to 12 months 80 mcg DFE   Children ages 1 to 3 years 150 mcg DFE   Children ages 4 to 8 years 200 mcg DFE   Children ages 9 to 13 years 300 mcg DFE   Teenagers ages 14 to 18 years 400 mcg DFE   Adults ages 19 years and up 400 mcg DFE   Pregnant people 600 mcg DFE   Breastfeeding  people 500 mcg DFE     If you're taking any medication that interferes with folate absorption, you should also take a folic acid supplement.  [place] / Prognosis  What can I expect if I have folate deficiency?  If you increase your folate intake, the effects of folate deficiency should start to reverse. It's important to eat enough foods that contain folate or are enriched with folic acid. In addition, take a folic acid supplement. If you don't get enough folate, complications such as anemia will be ongoing.  Additional Common Questions  What is cerebral folate deficiency?  Cerebral folate deficiency is a very rare disorder that occurs when there's a shortage of folate in the fetus's brain. Babies born with cerebral folate deficiency develop normally during infancy. Then, they begin to slowly lose their mental skills and movement abilities about age 2. Intellectual disabilities, speech difficulties, seizures and difficulty coordinating movements (ataxia) can be severe. Cerebral folate deficiency is caused by a gene change (mutation).  What's the difference between B12 and folate deficiency?  Vitamin B12 and folate are both important for the formation of your red blood cells and DNA. A deficiency in either vitamin can lead to fatigue, weakness and anemia. Unlike folate, B12 isn't found in plants. B12 is mainly found in meat, eggs and dairy products. Vegetarians and vegans are at a high risk of B12 deficiency. Severe vitamin B12 deficiency can lead to complications  such as depression, paranoia, delusions, memory loss, incontinence and loss of taste and smell.  What is MTHFR polymorphism?  MTHFR stands for methylenetetrahydrofolate reductase. Some people have a genetic change (mutation) in their MTHFR gene. If you have this mutation, you aren't able to convert folate to its active form, 5-MTHF. This genetic mutation affects about 25% of  people, 10% of white people, 10% of  people and 1% of Black people. If you have this genetic mutation, you may benefit from using a folate supplement that contains 5-methyl-THF (methylfolate), the active form of folic acid.  A note from [facility]  Folate is a vitamin that helps your body make red blood cells and DNA. Folate is especially important for people who are pregnant, as it aids in fetal development. While folate deficiency is rare, it can cause severe complications such as birth defects and anemia. So it's important to eat a diet rich in fruits, vegetables and other foods that contain folate or folic acid. In addition, you can take a folic acid supplement. Your healthcare provider can advise you on the amount of folate you should be getting each day.

## 2025-01-29 NOTE — ASSESSMENT & PLAN NOTE
Vitamin B12 level low last check as well. Patient admits she's not always taking vitamins/supplements. Unsure if insurance will cover but sent vitamin B12 to take daily.

## 2025-01-29 NOTE — PROGRESS NOTES
"Chief Complaint  Hypertension    Subjective        Heidi Franklin presents to [facility]  History of Present Illness   had heart attack after last visit  Son wasn't going to school  Lot of stress  Mood down--on Pristiq 50 mg    Ongoing pain, being followed by specialist  Has had some nerve pain, has been taking Lyrica prescribed by outside provider approx 2021 that she had at home  Takes folic acid sometimes, wasn't able to get methylfolate      Objective   Vital Signs:  /80   Pulse 82   Temp 97.3 °F (36.3 °C)   Ht 165.1 cm (65\")   Wt 87.6 kg (193 lb 3.2 oz)   SpO2 98%   BMI 32.15 kg/m²   Estimated body mass index is 32.15 kg/m² as calculated from the following:    Height as of this encounter: 165.1 cm (65\").    Weight as of this encounter: 87.6 kg (193 lb 3.2 oz).            Physical Exam  Vitals and nursing note reviewed.   Constitutional:       General: She is not in acute distress.     Appearance: Normal appearance. She is well-developed and well-groomed. She is not ill-appearing, toxic-appearing or diaphoretic.   HENT:      Head: Normocephalic and atraumatic.      Right Ear: Hearing normal.      Left Ear: Hearing normal.   Eyes:      General: Lids are normal. No scleral icterus.     Extraocular Movements: Extraocular movements intact.   Neck:      Trachea: Phonation normal.   Cardiovascular:      Rate and Rhythm: Normal rate and regular rhythm.   Pulmonary:      Effort: Pulmonary effort is normal.   Musculoskeletal:      Cervical back: Neck supple.   Skin:     Coloration: Skin is not jaundiced or pale.   Neurological:      General: No focal deficit present.      Mental Status: She is alert and oriented to person, place, and time.      Motor: Motor function is intact.   Psychiatric:         Attention and Perception: Attention and perception normal.         Mood and Affect: Mood and affect normal.         Speech: Speech normal.         Behavior: Behavior normal. " Behavior is cooperative.         Thought Content: Thought content normal.         Cognition and Memory: Cognition and memory normal.         Judgment: Judgment normal.        Result Review :  The following data was reviewed by: Martine Evans MD on 01/29/2025:  External Facility Surgical / Procedural Request (08/28/2024 14:53) repeat 3 years    Urine Drug Screen - Urine, Clean Catch (07/29/2024 17:28)     Lab Results   Component Value Date    [id number] 07/29/2024      Lab Results   Component Value Date    FOLATE <2.0 (L) 07/29/2024     Component      Latest Ref Rng 7/29/2024   Homocystine, Plasma (Quant)      0.0 - 14.5 umol/L 16.4 (H)              Assessment and Plan   Diagnoses and all orders for this visit:    1. Folic acid deficiency (Primary)  Assessment & Plan:  Emphasized need to be on supplement. Will discuss lab recheck next visit.     Orders:  -     folic acid (FOLVITE) 1 MG tablet; Take 1 tablet by mouth Daily.  Dispense: 90 tablet; Refill: 3    2. Vitamin B12 deficiency  Assessment & Plan:  Vitamin B12 level low last check as well. Patient admits she's not always taking vitamins/supplements. Unsure if insurance will cover but sent vitamin B12 to take daily.    Orders:  -     vitamin B-12 (CYANOCOBALAMIN) 1000 MCG tablet; Take 1 tablet by mouth Daily.  Dispense: 90 tablet; Refill: 4    3. Essential hypertension  Assessment & Plan:  Hypertension is stable and controlled  Continue current treatment regimen.  Blood pressure will be reassessed  at next visit  .      4. Anxiety  -     desvenlafaxine (Pristiq) 100 MG 24 hr tablet; Take 1 tablet by mouth Daily.  Dispense: 90 tablet; Refill: 3    5. Chronic neck pain  -     desvenlafaxine (Pristiq) 100 MG 24 hr tablet; Take 1 tablet by mouth Daily.  Dispense: 90 tablet; Refill: 3    6. Moderate episode of recurrent major depressive disorder  -     desvenlafaxine (Pristiq) 100 MG 24 hr tablet; Take 1 tablet by mouth Daily.  Dispense: 90 tablet; Refill:  3    7. Seasonal affective disorder  Assessment & Plan:  Increased Pristiq to max 100 mg daily. Take folic acid supplement.    Orders:  -     desvenlafaxine (Pristiq) 100 MG 24 hr tablet; Take 1 tablet by mouth Daily.  Dispense: 90 tablet; Refill: 3    8. Acquired hypothyroidism  Assessment & Plan:  Plan to do labs next time. Will discuss at her next appointment. Take levothyroxine daily even if she can't take on empty stomach.               Follow Up   Return in about 8 weeks (around 3/24/2025) for recheck bp, labs that day?.  Patient was given instructions and counseling regarding her condition or for health maintenance advice. Please see specific information pulled into the AVS if appropriate.

## 2025-02-03 ENCOUNTER — TRANSCRIBE ORDERS (OUTPATIENT)
Dept: SURGERY | Facility: CLINIC | Age: 45
End: 2025-02-03
Payer: MEDICARE

## 2025-02-03 DIAGNOSIS — Z12.31 VISIT FOR SCREENING MAMMOGRAM: Primary | ICD-10-CM

## 2025-03-14 DIAGNOSIS — G89.29 CHRONIC NECK PAIN: ICD-10-CM

## 2025-03-14 DIAGNOSIS — M54.2 CHRONIC NECK PAIN: ICD-10-CM

## 2025-03-14 DIAGNOSIS — R06.2 WHEEZING: ICD-10-CM

## 2025-03-14 DIAGNOSIS — E55.9 VITAMIN D DEFICIENCY: ICD-10-CM

## 2025-03-14 RX ORDER — DIAZEPAM 5 MG/1
5 TABLET ORAL 3 TIMES DAILY PRN
Qty: 90 TABLET | Refills: 0 | Status: SHIPPED | OUTPATIENT
Start: 2025-03-14

## 2025-03-14 RX ORDER — ALBUTEROL SULFATE 90 UG/1
2 INHALANT RESPIRATORY (INHALATION) EVERY 6 HOURS PRN
Qty: 18 G | Refills: 11 | Status: SHIPPED | OUTPATIENT
Start: 2025-03-14

## 2025-03-14 NOTE — TELEPHONE ENCOUNTER
Rx Refill Note  Requested Prescriptions     Pending Prescriptions Disp Refills    diazePAM (VALIUM) 5 MG tablet 90 tablet 4     Sig: Take 1 tablet by mouth 3 (Three) Times a Day As Needed. for muscle spams      Last office visit with prescribing clinician: 1/29/2025   Last telemedicine visit with prescribing clinician: Visit date not found   Next office visit with prescribing clinician: 3/14/2025     Nate Gabriel MA  03/14/25, 14:46 EDT

## 2025-03-26 ENCOUNTER — OFFICE VISIT (OUTPATIENT)
Dept: INTERNAL MEDICINE | Facility: CLINIC | Age: 45
End: 2025-03-26
Payer: MEDICARE

## 2025-03-26 VITALS
SYSTOLIC BLOOD PRESSURE: 132 MMHG | HEART RATE: 86 BPM | TEMPERATURE: 97.6 F | HEIGHT: 65 IN | BODY MASS INDEX: 31.46 KG/M2 | OXYGEN SATURATION: 97 % | WEIGHT: 188.8 LBS | DIASTOLIC BLOOD PRESSURE: 88 MMHG

## 2025-03-26 DIAGNOSIS — E53.1 VITAMIN B6 DEFICIENCY: ICD-10-CM

## 2025-03-26 DIAGNOSIS — E03.9 ACQUIRED HYPOTHYROIDISM: ICD-10-CM

## 2025-03-26 DIAGNOSIS — E53.8 VITAMIN B12 DEFICIENCY: ICD-10-CM

## 2025-03-26 DIAGNOSIS — E53.8 FOLIC ACID DEFICIENCY: ICD-10-CM

## 2025-03-26 DIAGNOSIS — E55.9 VITAMIN D DEFICIENCY: ICD-10-CM

## 2025-03-26 DIAGNOSIS — M54.12 CERVICAL RADICULOPATHY: ICD-10-CM

## 2025-03-26 DIAGNOSIS — R73.9 HYPERGLYCEMIA: ICD-10-CM

## 2025-03-26 DIAGNOSIS — Z51.81 MEDICATION MONITORING ENCOUNTER: ICD-10-CM

## 2025-03-26 DIAGNOSIS — R79.9 ABNORMAL BLOOD CHEMISTRY: ICD-10-CM

## 2025-03-26 DIAGNOSIS — M48.02 SPINAL STENOSIS IN CERVICAL REGION: ICD-10-CM

## 2025-03-26 DIAGNOSIS — G62.9 PERIPHERAL POLYNEUROPATHY: ICD-10-CM

## 2025-03-26 DIAGNOSIS — I10 ESSENTIAL HYPERTENSION: Primary | ICD-10-CM

## 2025-03-26 RX ORDER — PREGABALIN 75 MG/1
75 CAPSULE ORAL 2 TIMES DAILY
Qty: 180 CAPSULE | Refills: 1 | Status: SHIPPED | OUTPATIENT
Start: 2025-03-26

## 2025-03-26 NOTE — PROGRESS NOTES
"Chief Complaint  Hypertension    Subjective        Heidi Franklin presents to [facility]  History of Present Illness  Ongoing stress with son, etc  Chronic pain having injections  Followed by multiple specialists for pain  Recent injections in wrists  Neuropathy worsened went back on Lyrica helps  Hasn't been taking vitamin b6 but has been taking vitamin B12   Can return to clinic for labs       Objective   Vital Signs:  /88   Pulse 86   Temp 97.6 °F (36.4 °C)   Ht 165.1 cm (65\")   Wt 85.6 kg (188 lb 12.8 oz)   SpO2 97%   BMI 31.42 kg/m²   Estimated body mass index is 31.42 kg/m² as calculated from the following:    Height as of this encounter: 165.1 cm (65\").    Weight as of this encounter: 85.6 kg (188 lb 12.8 oz).            Physical Exam  Vitals and nursing note reviewed.   Constitutional:       General: She is not in acute distress.     Appearance: Normal appearance. She is well-developed and well-groomed. She is obese. She is not ill-appearing, toxic-appearing or diaphoretic.   HENT:      Head: Normocephalic and atraumatic.      Right Ear: Hearing normal.      Left Ear: Hearing normal.   Eyes:      General: Lids are normal. No scleral icterus.     Extraocular Movements: Extraocular movements intact.   Neck:      Trachea: Phonation normal.   Cardiovascular:      Rate and Rhythm: Normal rate and regular rhythm.   Pulmonary:      Effort: Pulmonary effort is normal.   Musculoskeletal:      Cervical back: Neck supple.   Skin:     Coloration: Skin is not jaundiced or pale.   Neurological:      General: No focal deficit present.      Mental Status: She is alert and oriented to person, place, and time.      Motor: Motor function is intact.   Psychiatric:         Attention and Perception: Attention and perception normal.         Mood and Affect: Mood and affect normal.         Speech: Speech normal.         Behavior: Behavior normal. Behavior is cooperative.         Thought " Content: Thought content normal.         Cognition and Memory: Cognition and memory normal.         Judgment: Judgment normal.        Result Review :  The following data was reviewed by: Martine Evans MD on 03/26/2025:  Lab Results   Component Value Date    CHLPL 211 (H) 07/29/2024    TRIG 222 (H) 07/29/2024    HDL 35 (L) 07/29/2024     (H) 07/29/2024      Lab Results   Component Value Date    TSH 1.170 07/29/2024     Lab Results   Component Value Date    HGBA1C 5.6 07/29/2024    HGBA1C 5.40 06/23/2023    HGBA1C 5.50 01/27/2023                Assessment and Plan   Diagnoses and all orders for this visit:    1. Essential hypertension (Primary)  Assessment & Plan:  Hypertension is borderline  Goal <130/80; take beta blocker with food may help with efficacy.   Blood pressure will be reassessedat the next regular appointment.    Orders:  -     CBC (No Diff)  -     Comprehensive Metabolic Panel  -     Homocysteine  -     Methylmalonic Acid, Serum  -     TSH Rfx On Abnormal To Free T4    2. Folic acid deficiency  -     Folate  -     CBC (No Diff)  -     Homocysteine  -     Methylmalonic Acid, Serum    3. Vitamin B12 deficiency  -     Vitamin B12  -     CBC (No Diff)  -     Homocysteine  -     Methylmalonic Acid, Serum    4. Acquired hypothyroidism  -     CBC (No Diff)  -     TSH Rfx On Abnormal To Free T4    5. Vitamin D deficiency  -     Vitamin D,25-Hydroxy    6. Vitamin B6 deficiency  -     CBC (No Diff)  -     Vitamin B6    7. Hyperglycemia  -     Comprehensive Metabolic Panel  -     Hemoglobin A1c    8. Abnormal blood chemistry  -     Vitamin B12  -     Folate  -     CBC (No Diff)  -     Comprehensive Metabolic Panel  -     Lipid Panel  -     Vitamin D,25-Hydroxy  -     Homocysteine  -     Methylmalonic Acid, Serum  -     Vitamin B6  -     TSH Rfx On Abnormal To Free T4  -     Hemoglobin A1c    9. Medication monitoring encounter  -     Vitamin B12  -     Folate  -     CBC (No Diff)  -     Comprehensive  Metabolic Panel  -     Lipid Panel  -     Vitamin D,25-Hydroxy  -     Homocysteine  -     Methylmalonic Acid, Serum  -     Vitamin B6  -     TSH Rfx On Abnormal To Free T4  -     Hemoglobin A1c    10. Spinal stenosis in cervical region  -     pregabalin (LYRICA) 75 MG capsule; Take 1 capsule by mouth 2 (Two) Times a Day.  Dispense: 180 capsule; Refill: 1    11. Cervical radiculopathy  -     pregabalin (LYRICA) 75 MG capsule; Take 1 capsule by mouth 2 (Two) Times a Day.  Dispense: 180 capsule; Refill: 1    12. Peripheral polyneuropathy  -     pregabalin (LYRICA) 75 MG capsule; Take 1 capsule by mouth 2 (Two) Times a Day.  Dispense: 180 capsule; Refill: 1  -     Vitamin B12  -     Folate  -     CBC (No Diff)  -     Comprehensive Metabolic Panel  -     Homocysteine  -     Methylmalonic Acid, Serum  -     Vitamin B6  -     Hemoglobin A1c             Follow Up   Return in about 6 weeks (around 5/7/2025) for Blood Pressure follow up.  Patient was given instructions and counseling regarding her condition or for health maintenance advice. Please see specific information pulled into the AVS if appropriate.

## 2025-03-26 NOTE — ASSESSMENT & PLAN NOTE
Hypertension is borderline  Goal <130/80; take beta blocker with food may help with efficacy.   Blood pressure will be reassessedat the next regular appointment.

## 2025-03-28 ENCOUNTER — PATIENT MESSAGE (OUTPATIENT)
Dept: INTERNAL MEDICINE | Facility: CLINIC | Age: 45
End: 2025-03-28
Payer: MEDICARE

## 2025-03-28 DIAGNOSIS — E53.1 VITAMIN B6 DEFICIENCY: Primary | ICD-10-CM

## 2025-03-28 RX ORDER — DIPHENHYDRAMINE HYDROCHLORIDE 25 MG/1
25 CAPSULE ORAL DAILY
Qty: 90 TABLET | Refills: 0 | Status: SHIPPED | OUTPATIENT
Start: 2025-03-28

## 2025-06-04 DIAGNOSIS — M54.2 CHRONIC NECK PAIN: ICD-10-CM

## 2025-06-04 DIAGNOSIS — G89.29 CHRONIC NECK PAIN: ICD-10-CM

## 2025-06-04 RX ORDER — DIAZEPAM 5 MG/1
5 TABLET ORAL 3 TIMES DAILY PRN
Qty: 90 TABLET | Refills: 0 | Status: SHIPPED | OUTPATIENT
Start: 2025-06-04

## 2025-06-04 NOTE — TELEPHONE ENCOUNTER
Rx Refill Note  Requested Prescriptions     Pending Prescriptions Disp Refills    diazePAM (VALIUM) 5 MG tablet 90 tablet 0     Sig: Take 1 tablet by mouth 3 (Three) Times a Day As Needed for Muscle Spasms.      Last office visit with prescribing clinician: 03/26/2025  Last telemedicine visit with prescribing clinician: Visit date not found   Next office visit with prescribing clinician: 06/18/2025    Nate Gabriel MA  06/04/25, 12:26 EDT

## 2025-06-12 NOTE — ADDENDUM NOTE
Addended by: MARIZOL PAIGE on: 8/25/2020 01:11 PM     Modules accepted: Orders     Detail Level: Detailed

## 2025-06-18 ENCOUNTER — OFFICE VISIT (OUTPATIENT)
Dept: INTERNAL MEDICINE | Facility: CLINIC | Age: 45
End: 2025-06-18
Payer: MEDICARE

## 2025-06-18 VITALS
SYSTOLIC BLOOD PRESSURE: 124 MMHG | HEIGHT: 65 IN | TEMPERATURE: 96.8 F | OXYGEN SATURATION: 95 % | DIASTOLIC BLOOD PRESSURE: 82 MMHG | BODY MASS INDEX: 31.82 KG/M2 | HEART RATE: 88 BPM | WEIGHT: 191 LBS

## 2025-06-18 DIAGNOSIS — E03.9 ACQUIRED HYPOTHYROIDISM: ICD-10-CM

## 2025-06-18 DIAGNOSIS — Z00.00 MEDICARE ANNUAL WELLNESS VISIT, SUBSEQUENT: Primary | ICD-10-CM

## 2025-06-18 DIAGNOSIS — I10 ESSENTIAL HYPERTENSION: ICD-10-CM

## 2025-06-18 DIAGNOSIS — G89.29 CHRONIC NECK PAIN: ICD-10-CM

## 2025-06-18 DIAGNOSIS — E53.8 VITAMIN B12 DEFICIENCY: ICD-10-CM

## 2025-06-18 DIAGNOSIS — E53.8 FOLIC ACID DEFICIENCY: ICD-10-CM

## 2025-06-18 DIAGNOSIS — G62.9 NEUROPATHY: ICD-10-CM

## 2025-06-18 DIAGNOSIS — Z00.01 ENCOUNTER FOR MEDICARE ANNUAL EXAMINATION WITH ABNORMAL FINDINGS: ICD-10-CM

## 2025-06-18 DIAGNOSIS — M54.2 CHRONIC NECK PAIN: ICD-10-CM

## 2025-06-18 PROBLEM — K08.9 DENTAL DISEASE: Status: ACTIVE | Noted: 2025-06-18

## 2025-06-18 RX ORDER — DIAZEPAM 5 MG/1
5 TABLET ORAL 3 TIMES DAILY PRN
Qty: 90 TABLET | Refills: 2 | Status: SHIPPED | OUTPATIENT
Start: 2025-06-18

## 2025-06-18 NOTE — PATIENT INSTRUCTIONS
Medicare Wellness  Personal Prevention Plan of Service     Date of Office Visit:    Encounter Provider:  Martine Evans MD  Place of Service:  [facility]  Patient Name: Heidi Franklin  :  1980    As part of the Medicare Wellness portion of your visit today, we are providing you with this personalized preventive plan of services (PPPS). This plan is based upon recommendations of the [place] Preventive Services Task Force (USPSTF) and the Advisory Committee on Immunization Practices (ACIP).    This lists the preventive care services that should be considered, and provides dates of when you are due. Items listed as completed are up-to-date and do not require any further intervention.    Health Maintenance   Topic Date Due    ANNUAL WELLNESS VISIT  2025    INFLUENZA VACCINE  2025    LIPID PANEL  2025    MAMMOGRAM  2026    PAP SMEAR  2027    TDAP/TD VACCINES (3 - Td or Tdap) 2028    COLORECTAL CANCER SCREENING  2029    HEPATITIS C SCREENING  Completed    COVID-19 Vaccine  Completed    Pneumococcal Vaccine 0-49  Completed       No orders of the defined types were placed in this encounter.      No follow-ups on file.        Health Maintenance, Female    Adopting a healthy lifestyle and getting preventive care are important in promoting health and wellness. Ask your health care provider about:  The right schedule for you to have regular tests and exams.  Things you can do on your own to prevent diseases and keep yourself healthy.    What should I know about diet, weight, and exercise?  Eat a healthy diet    Eat a diet that includes plenty of vegetables, fruits, low-fat dairy products, and lean protein.  Do not eat a lot of foods that are high in solid fats, added sugars, or sodium.    Maintain a healthy weight  Body mass index (BMI) is used to identify weight problems. It estimates body fat based on height and weight. Your health  care provider can help determine your BMI and help you achieve or maintain a healthy weight.  Get regular exercise  Get regular exercise. This is one of the most important things you can do for your health. Most adults should:  Exercise for at least 150 minutes each week. The exercise should increase your heart rate and make you sweat (moderate-intensity exercise).  Do strengthening exercises at least twice a week. This is in addition to the moderate-intensity exercise.  Spend less time sitting. Even light physical activity can be beneficial.  Watch cholesterol and blood lipids  Have your blood tested for lipids and cholesterol at 20 years of age, then have this test every 5 years.  Have your cholesterol levels checked more often if:  Your lipid or cholesterol levels are high.  You are older than 40 years of age.  You are at high risk for heart disease.    What should I know about cancer screening?  Depending on your health history and family history, you may need to have cancer screening at various ages. This may include screening for:  Breast cancer.  Cervical cancer.  Colorectal cancer.  Skin cancer.  Lung cancer.    What should I know about heart disease, diabetes, and high blood pressure?  Blood pressure and heart disease  High blood pressure causes heart disease and increases the risk of stroke. This is more likely to develop in people who have high blood pressure readings or are overweight.  Have your blood pressure checked:  Every 3-5 years if you are 18-39 years of age.  Every year if you are 40 years old or older.  Diabetes  Have regular diabetes screenings. This checks your fasting blood sugar level. Have the screening done:  Once every three years after age 40 if you are at a normal weight and have a low risk for diabetes.  More often and at a younger age if you are overweight or have a high risk for diabetes.    What should I know about preventing infection?  Hepatitis B  If you have a higher risk for  hepatitis B, you should be screened for this virus. Talk with your health care provider to find out if you are at risk for hepatitis B infection.  Hepatitis C  Testing is recommended for:  All adults aged 18 to 79 years  Anyone with known risk factors for hepatitis C.  Sexually transmitted infections (STIs)  Get screened for STIs, including gonorrhea and chlamydia, if:  You are sexually active and are younger than 24 years of age.  You are older than 24 years of age and your health care provider tells you that you are at risk for this type of infection.  Your sexual activity has changed since you were last screened, and you are at increased risk for chlamydia or gonorrhea. Ask your health care provider if you are at risk.  Ask your health care provider about whether you are at high risk for HIV. Your health care provider may recommend a prescription medicine to help prevent HIV infection. If you choose to take medicine to prevent HIV, you should first get tested for HIV. You should then be tested every 3 months for as long as you are taking the medicine.    Pregnancy  If you are about to stop having your period (premenopausal) and you may become pregnant, seek counseling before you get pregnant.  Take 400 to 800 micrograms (mcg) of folic acid every day if you become pregnant.  Ask for birth control (contraception) if you want to prevent pregnancy.    Osteoporosis and menopause  Osteoporosis is a disease in which the bones lose minerals and strength with aging. This can result in bone fractures. If you are 65 years old or older, or if you are at risk for osteoporosis and fractures, ask your health care provider if you should:  Be screened for bone loss.  Take a calcium or vitamin D supplement to lower your risk of fractures.  Be given hormone replacement therapy (HRT) to treat symptoms of menopause.    Follow these instructions at home:  Alcohol use  Do not drink alcohol if:  Your health care provider tells you not  to drink.  You are pregnant, may be pregnant, or are planning to become pregnant.  If you drink alcohol:  Limit how much you have to:  0-1 drink a day.  Know how much alcohol is in your drink. In the [place] one drink equals one 12 oz bottle of beer (355 mL), one 5 oz glass of wine (148 mL), or one 1½ oz glass of hard liquor (44 mL).  Lifestyle  Do not use any products that contain nicotine or tobacco. These products include cigarettes, chewing tobacco, and vaping devices, such as e-cigarettes. If you need help quitting, ask your health care provider.  Do not use street drugs.  Do not share needles.  Ask your health care provider for help if you need support or information about quitting drugs.    General instructions  Schedule regular health, dental, and eye exams.  Stay current with your vaccines.  Tell your health care provider if:  You often feel depressed.  You have ever been abused or do not feel safe at home.    Summary  Adopting a healthy lifestyle and getting preventive care are important in promoting health and wellness.  Follow your health care provider's instructions about healthy diet, exercising, and getting tested or screened for diseases.  Follow your health care provider's instructions on monitoring your cholesterol and blood pressure.    This information is not intended to replace advice given to you by your health care provider. Make sure you discuss any questions you have with your health care provider.  Document Revised: 05/09/2022 Document Reviewed: 05/09/2022  [employer] Patient Education © 2023 [employer]  Updated 2/29/24 TC

## 2025-06-18 NOTE — PROGRESS NOTES
Subjective   The ABCs of the Annual Wellness Visit  Medicare Wellness Visit      Heidi Franklin is a 44 y.o. patient who presents for a Medicare Wellness Visit.    The following portions of the patient's history were reviewed and   updated as appropriate: allergies, current medications, past family history, past medical history, past social history, past surgical history, and problem list.    Compared to one year ago, the patient's physical   health is worse.  Compared to one year ago, the patient's mental   health is worse.    Recent Hospitalizations:  She was not admitted to the hospital during the last year.     Current Medical Providers:  Patient Care Team:  Martine Evans MD as PCP - General (Family Medicine)  Rizwan Perera MD as Consulting Physician (General Surgery)  Stephen Romero MD as Surgeon (Neurosurgery)  John May MD as Obstetrician (Obstetrics and Gynecology)  Luis Armando Addison PA-C as Physician Assistant (Physician Assistant)  Jose Laura MD as Consulting Physician (Hand Surgery)    Outpatient Medications Prior to Visit   Medication Sig Dispense Refill    acetaminophen (TYLENOL) 500 MG tablet Take 1 tablet by mouth Every 6 (Six) Hours As Needed for Mild Pain.      albuterol sulfate HFA (Ventolin HFA) 108 (90 Base) MCG/ACT inhaler Inhale 2 puffs by mouth Every 6 (Six) Hours As Needed for Wheezing or Shortness of Air. 18 g 11    celecoxib (CeleBREX) 200 MG capsule Take 1 capsule by mouth 2 (Two) Times a Day. For arthritic pain 180 capsule 3    chlorthalidone (HYGROTON) 25 MG tablet Take 1 tablet by mouth Daily. For high blood pressure. 90 tablet 3    Cholecalciferol 25 MCG (1000 UT) capsule Take 1 capsule by mouth Daily. Indications: Vitamin D Deficiency 90 capsule 3    desvenlafaxine (Pristiq) 100 MG 24 hr tablet Take 1 tablet by mouth Daily. 90 tablet 3    fluconazole (Diflucan) 150 MG tablet Take 1 tablet by mouth now and repeat in 4 days 2 tablet 1    folic  acid (FOLVITE) 1 MG tablet Take 1 tablet by mouth Daily. 90 tablet 3    HYDROcodone-acetaminophen (NORCO) 5-325 MG per tablet Take 5 mg of hydrocodone by mouth.      levothyroxine (SYNTHROID, LEVOTHROID) 50 MCG tablet Take 1 tablet by mouth Daily. 90 tablet 3    metoprolol succinate XL (Toprol XL) 50 MG 24 hr tablet Take 1 tablet by mouth Daily. Indications: High Blood Pressure 90 tablet 3    pregabalin (LYRICA) 75 MG capsule Take 1 capsule by mouth 2 (Two) Times a Day. 180 capsule 1    Rimegepant Sulfate (Nurtec) 75 MG tablet dispersible tablet Take 1 tablet by mouth Daily As Needed (migraine). 8 tablet 11    vitamin B-12 (CYANOCOBALAMIN) 1000 MCG tablet Take 1 tablet by mouth Daily. 90 tablet 4    vitamin B-6 (PYRIDOXINE) 25 MG tablet Take 1 tablet by mouth Daily. For vitamin B6 deficiency. 90 tablet 0    diazePAM (VALIUM) 5 MG tablet Take 1 tablet by mouth 3 (Three) Times a Day As Needed for Muscle Spasms. 90 tablet 0     No facility-administered medications prior to visit.     Opioid medication/s are on active medication list.  and I have evaluated her active treatment plan and pain score trends (see table).  Vitals:    06/18/25 1521   PainSc: 8      I have reviewed the chart for potential of high risk medication and harmful drug interactions in the elderly.        Aspirin is not on active medication list.  Aspirin use is not indicated based on review of current medical condition/s. Risk of harm outweighs potential benefits.  .    Patient Active Problem List   Diagnosis    Anxiety    Headache, migraine    Affective disorder    Goiter, nontoxic, multinodular    Cervical disc herniation    Chronic neck pain    Cervical radiculopathy    Spinal stenosis in cervical region    Essential hypertension    Chronic pain of both knees    Fibrocystic breast changes    IgE deficiency    Vitamin B12 deficiency    Folic acid deficiency    Acquired hypothyroidism    Seasonal affective disorder    Dental disease     Advance Care  "Planning Advance Directive is not on file.  ACP discussion was declined by the patient. Patient does not have an advance directive, declines further assistance.            Objective   Vitals:    25 1521   BP: 124/82   Pulse: 88   Temp: 96.8 °F (36 °C)   SpO2: 95%   Weight: 86.6 kg (191 lb)   Height: 165.1 cm (65\")   PainSc: 8        Estimated body mass index is 31.78 kg/m² as calculated from the following:    Height as of this encounter: 165.1 cm (65\").    Weight as of this encounter: 86.6 kg (191 lb).           Does the patient have evidence of cognitive impairment? No                                                                                                Health  Risk Assessment    Smoking Status:  Social History     Tobacco Use   Smoking Status Every Day    Current packs/day: 1.50    Average packs/day: 1.5 packs/day for 15.0 years (22.5 ttl pk-yrs)    Types: Cigarettes   Smokeless Tobacco Never     Alcohol Consumption:  Social History     Substance and Sexual Activity   Alcohol Use No       Fall Risk Screen  STEADI Fall Risk Assessment was completed, and patient is at HIGH risk for falls. Assessment completed on:2025    Depression Screening   Little interest or pleasure in doing things? Not at all   Feeling down, depressed, or hopeless? Not at all   PHQ-2 Total Score 0      Health Habits and Functional and Cognitive Screenin/18/2025     3:15 PM   Functional & Cognitive Status   Do you have difficulty preparing food and eating? Yes   Do you have difficulty bathing yourself, getting dressed or grooming yourself? Yes   Do you have difficulty using the toilet? No   Do you have difficulty moving around from place to place? Yes   Do you have trouble with steps or getting out of a bed or a chair? Yes   Current Diet Other   Dental Exam Up to date   Eye Exam Not up to date   Exercise (times per week) 3 times per week   Current Exercises Include Walking;House Cleaning   Do you need help using the " phone?  No   Are you deaf or do you have serious difficulty hearing?  No   Do you need help to go to places out of walking distance? No   Do you need help shopping? No   Do you need help preparing meals?  Yes   Do you need help with housework?  Yes   Do you need help with laundry? Yes   Do you need help taking your medications? No   Do you need help managing money? No   Do you ever drive or ride in a car without wearing a seat belt? Yes   Have you felt unusual stress, anger or loneliness in the last month? Yes   Who do you live with? Spouse   If you need help, do you have trouble finding someone available to you? No   Have you been bothered in the last four weeks by sexual problems? No   Do you have difficulty concentrating, remembering or making decisions? Yes           Age-appropriate Screening Schedule:  Refer to the list below for future screening recommendations based on patient's age, sex and/or medical conditions. Orders for these recommended tests are listed in the plan section. The patient has been provided with a written plan.    Health Maintenance List  Health Maintenance   Topic Date Due    INFLUENZA VACCINE  07/01/2025    LIPID PANEL  07/29/2025    ANNUAL WELLNESS VISIT  06/18/2026    MAMMOGRAM  08/12/2026    PAP SMEAR  08/09/2027    TDAP/TD VACCINES (3 - Td or Tdap) 12/20/2028    COLORECTAL CANCER SCREENING  09/20/2029    HEPATITIS C SCREENING  Completed    COVID-19 Vaccine  Completed    Pneumococcal Vaccine 0-49  Completed                                                                                                                                                CMS Preventative Services Quick Reference  Risk Factors Identified During Encounter  Chronic Pain: Follow up in 3 months.  Continue current medicines, UDS next time. Discussed cannabis.    The above risks/problems have been discussed with the patient.  Pertinent information has been shared with the patient in the After Visit Summary.  An  "After Visit Summary and PPPS were made available to the patient.    Follow Up:   Next Medicare Wellness visit to be scheduled in 1 year.         Additional E&M Note during same encounter follows:  Patient has additional, significant, and separately identifiable condition(s)/problem(s) that require work above and beyond the Medicare Wellness Visit     Chief Complaint  Medicare Wellness-subsequent, Annual Exam, and Hypertension    Subjective   Has lost another tooth  Followed by dentist  Bone loss, possible gum disease  Continues to smoke, trying to cut back  Has hard time biting nicotine gum to break seal    Will return to clinic this week for her labs    Lyrica rx bid but usually taking qday  Burning in feet worsening  S/p ablation with [facility]  S/p neck surgery years ago  Chronic benzo use for muscle spasms in neck      Heidi is also being seen today for an annual adult preventative physical exam.  and Heidi is also being seen today for additional medical problem/s.                Objective   Vital Signs:  /82   Pulse 88   Temp 96.8 °F (36 °C)   Ht 165.1 cm (65\")   Wt 86.6 kg (191 lb)   SpO2 95%   BMI 31.78 kg/m²   Physical Exam  Vitals and nursing note reviewed.   Constitutional:       General: She is not in acute distress.     Appearance: Normal appearance. She is well-developed and well-groomed. She is obese. She is not ill-appearing, toxic-appearing or diaphoretic.   HENT:      Head: Normocephalic and atraumatic.      Right Ear: Hearing normal.      Left Ear: Hearing normal.      Mouth/Throat:      Dentition: Abnormal dentition.   Eyes:      General: Lids are normal. No scleral icterus.     Extraocular Movements: Extraocular movements intact.   Neck:      Trachea: Phonation normal.   Cardiovascular:      Rate and Rhythm: Normal rate and regular rhythm.   Pulmonary:      Effort: Pulmonary effort is normal.      Breath sounds: Normal breath sounds.   Musculoskeletal:      Cervical back: Neck supple. "   Skin:     Coloration: Skin is not jaundiced or pale.   Neurological:      General: No focal deficit present.      Mental Status: She is alert and oriented to person, place, and time.      Motor: Motor function is intact.   Psychiatric:         Attention and Perception: Attention and perception normal.         Mood and Affect: Mood and affect normal.         Speech: Speech normal.         Behavior: Behavior normal. Behavior is cooperative.         Thought Content: Thought content normal.         Cognition and Memory: Cognition and memory normal.         Judgment: Judgment normal.         The following data was reviewed by: Martine Evans MD on 06/18/2025:  Component      Latest Ref Rng 6/23/2023 7/29/2024   Folate      >3.0 ng/mL 4.21 (L)  <2.0 (L)      Component      Latest Ref Rng 7/29/2024   Homocystine, Plasma (Quant)      0.0 - 14.5 umol/L 16.4 (H)       Lab Results   Component Value Date    [id number] 07/29/2024      Mammo Screening Digital Tomosynthesis Bilateral With CAD (08/12/2024 08:48)  (scheduled 8/13/25)         Assessment and Plan Additional age appropriate preventative wellness advice topics were discussed during today's preventative wellness exam(some topics already addressed during AWV portion of the note above):   Healthy Weight: Discussed current and goal BMI with patient. Steps to attain this goal discussed. Information shared in after visit summary.    Diagnoses and all orders for this visit:    1. Medicare annual wellness visit, subsequent (Primary)    2. Encounter for Medicare annual examination with abnormal findings    3. Neuropathy  Comments:  continue lyrica, try bid. labs as previously ordered    4. Vitamin B12 deficiency  Assessment & Plan:  Labs previously ordered, she'll have drawn later this week. Could worsen neuropathy if still deficient.      5. Folic acid deficiency  Assessment & Plan:  Labs previously ordered, she'll have drawn later this week. Could worsen neuropathy  if still deficient.      6. Acquired hypothyroidism  Assessment & Plan:  Labs previously ordered, she'll have drawn later this week.      7. Essential hypertension  Assessment & Plan:  Hypertension is stable and controlled  Continue current treatment regimen.  Blood pressure will be reassessed in 3 months.      8. Chronic neck pain  Assessment & Plan:  Follow up with [facility]. UDS due next visit.     Orders:  -     diazePAM (VALIUM) 5 MG tablet; Take 1 tablet by mouth 3 (Three) Times a Day As Needed for Muscle Spasms.  Dispense: 90 tablet; Refill: 2           I spent 22 minutes caring for Heidi on this date of service. This time includes time spent by me in the following activities:preparing for the visit, reviewing tests, performing a medically appropriate examination and/or evaluation , counseling and educating the patient/family/caregiver, ordering medications, tests, or procedures, and documenting information in the medical record    I spent 15 minutes on the separately reported service of [id number]. This time is not included in the time used to support the E/M service also reported today.     Follow Up   Return in about 3 months (around 9/18/2025) for Blood Pressure follow up.  Patient was given instructions and counseling regarding her condition or for health maintenance advice. Please see specific information pulled into the AVS if appropriate.

## 2025-06-18 NOTE — ASSESSMENT & PLAN NOTE
Labs previously ordered, she'll have drawn later this week. Could worsen neuropathy if still deficient.

## 2025-06-20 ENCOUNTER — LAB (OUTPATIENT)
Dept: LAB | Facility: HOSPITAL | Age: 45
End: 2025-06-20
Payer: MEDICARE

## 2025-06-20 LAB
25(OH)D3 SERPL-MCNC: 20.3 NG/ML (ref 30–100)
ALBUMIN SERPL-MCNC: 4.3 G/DL (ref 3.5–5.2)
ALBUMIN/GLOB SERPL: 1.5 G/DL
ALP SERPL-CCNC: 103 U/L (ref 39–117)
ALT SERPL W P-5'-P-CCNC: 24 U/L (ref 1–33)
ANION GAP SERPL CALCULATED.3IONS-SCNC: 11.5 MMOL/L (ref 5–15)
AST SERPL-CCNC: 25 U/L (ref 1–32)
BILIRUB SERPL-MCNC: <0.2 MG/DL (ref 0–1.2)
BUN SERPL-MCNC: 11 MG/DL (ref 6–20)
BUN/CREAT SERPL: 14.7 (ref 7–25)
CALCIUM SPEC-SCNC: 10.1 MG/DL (ref 8.6–10.5)
CHLORIDE SERPL-SCNC: 98 MMOL/L (ref 98–107)
CHOLEST SERPL-MCNC: 220 MG/DL (ref 0–200)
CO2 SERPL-SCNC: 29.5 MMOL/L (ref 22–29)
CREAT SERPL-MCNC: 0.75 MG/DL (ref 0.57–1)
DEPRECATED RDW RBC AUTO: 44.9 FL (ref 37–54)
EGFRCR SERPLBLD CKD-EPI 2021: 99.6 ML/MIN/1.73
ERYTHROCYTE [DISTWIDTH] IN BLOOD BY AUTOMATED COUNT: 12.6 % (ref 12.3–15.4)
FOLATE SERPL-MCNC: 14.1 NG/ML (ref 4.78–24.2)
GLOBULIN UR ELPH-MCNC: 2.8 GM/DL
GLUCOSE SERPL-MCNC: 105 MG/DL (ref 65–99)
HBA1C MFR BLD: 5.4 % (ref 4.8–5.6)
HCT VFR BLD AUTO: 45.6 % (ref 34–46.6)
HCYS SERPL-MCNC: 6.9 UMOL/L (ref 0–15)
HDLC SERPL-MCNC: 40 MG/DL (ref 40–60)
HGB BLD-MCNC: 15.7 G/DL (ref 12–15.9)
LDLC SERPL CALC-MCNC: 151 MG/DL (ref 0–100)
LDLC/HDLC SERPL: 3.69 {RATIO}
MCH RBC QN AUTO: 33.2 PG (ref 26.6–33)
MCHC RBC AUTO-ENTMCNC: 34.4 G/DL (ref 31.5–35.7)
MCV RBC AUTO: 96.4 FL (ref 79–97)
PLATELET # BLD AUTO: 247 10*3/MM3 (ref 140–450)
PMV BLD AUTO: 11.3 FL (ref 6–12)
POTASSIUM SERPL-SCNC: 3.2 MMOL/L (ref 3.5–5.2)
PROT SERPL-MCNC: 7.1 G/DL (ref 6–8.5)
RBC # BLD AUTO: 4.73 10*6/MM3 (ref 3.77–5.28)
SODIUM SERPL-SCNC: 139 MMOL/L (ref 136–145)
TRIGL SERPL-MCNC: 162 MG/DL (ref 0–150)
TSH SERPL DL<=0.05 MIU/L-ACNC: 1.22 UIU/ML (ref 0.27–4.2)
VIT B12 BLD-MCNC: 755 PG/ML (ref 211–946)
VLDLC SERPL-MCNC: 29 MG/DL (ref 5–40)
WBC NRBC COR # BLD AUTO: 9.84 10*3/MM3 (ref 3.4–10.8)

## 2025-06-20 PROCEDURE — 82607 VITAMIN B-12: CPT | Performed by: FAMILY MEDICINE

## 2025-06-20 PROCEDURE — 84443 ASSAY THYROID STIM HORMONE: CPT | Performed by: FAMILY MEDICINE

## 2025-06-20 PROCEDURE — 84207 ASSAY OF VITAMIN B-6: CPT | Performed by: FAMILY MEDICINE

## 2025-06-20 PROCEDURE — 85027 COMPLETE CBC AUTOMATED: CPT | Performed by: FAMILY MEDICINE

## 2025-06-20 PROCEDURE — 83036 HEMOGLOBIN GLYCOSYLATED A1C: CPT | Performed by: FAMILY MEDICINE

## 2025-06-20 PROCEDURE — 80061 LIPID PANEL: CPT | Performed by: FAMILY MEDICINE

## 2025-06-20 PROCEDURE — 82306 VITAMIN D 25 HYDROXY: CPT | Performed by: FAMILY MEDICINE

## 2025-06-20 PROCEDURE — 83090 ASSAY OF HOMOCYSTEINE: CPT | Performed by: FAMILY MEDICINE

## 2025-06-20 PROCEDURE — 80053 COMPREHEN METABOLIC PANEL: CPT | Performed by: FAMILY MEDICINE

## 2025-06-20 PROCEDURE — 83921 ORGANIC ACID SINGLE QUANT: CPT | Performed by: FAMILY MEDICINE

## 2025-06-20 PROCEDURE — 82746 ASSAY OF FOLIC ACID SERUM: CPT | Performed by: FAMILY MEDICINE

## 2025-06-24 LAB — PYRIDOXAL PHOS SERPL-MCNC: 40.2 UG/L (ref 3.4–65.2)

## 2025-06-25 DIAGNOSIS — E53.1 VITAMIN B6 DEFICIENCY: ICD-10-CM

## 2025-06-25 LAB — METHYLMALONATE SERPL-SCNC: 139 NMOL/L (ref 0–378)

## 2025-06-25 RX ORDER — DIPHENHYDRAMINE HYDROCHLORIDE 25 MG/1
25 CAPSULE ORAL DAILY
Qty: 90 TABLET | Refills: 0 | Status: SHIPPED | OUTPATIENT
Start: 2025-06-25

## 2025-08-15 RX ORDER — HYDROCODONE BITARTRATE AND ACETAMINOPHEN 5; 325 MG/1; MG/1
TABLET ORAL
OUTPATIENT
Start: 2025-08-15

## (undated) DEVICE — NDL SPINE 22G 31/2IN BLK

## (undated) DEVICE — TRY L/P SFTY A/20G